# Patient Record
Sex: FEMALE | Race: WHITE | Employment: FULL TIME | URBAN - NONMETROPOLITAN AREA
[De-identification: names, ages, dates, MRNs, and addresses within clinical notes are randomized per-mention and may not be internally consistent; named-entity substitution may affect disease eponyms.]

---

## 2019-06-26 RX ORDER — TRIAMTERENE AND HYDROCHLOROTHIAZIDE 75; 50 MG/1; MG/1
1 TABLET ORAL DAILY
COMMUNITY
Start: 2015-03-25 | End: 2019-06-27 | Stop reason: SDUPTHER

## 2019-06-26 RX ORDER — BUDESONIDE AND FORMOTEROL FUMARATE DIHYDRATE 160; 4.5 UG/1; UG/1
2 AEROSOL RESPIRATORY (INHALATION) 2 TIMES DAILY
COMMUNITY
Start: 2018-05-24 | End: 2019-09-26 | Stop reason: SDUPTHER

## 2019-06-26 RX ORDER — TOPIRAMATE 25 MG/1
1 TABLET ORAL NIGHTLY
COMMUNITY
Start: 2018-05-24 | End: 2019-06-27 | Stop reason: SDUPTHER

## 2019-06-26 RX ORDER — DEXTROAMPHETAMINE SACCHARATE, AMPHETAMINE ASPARTATE MONOHYDRATE, DEXTROAMPHETAMINE SULFATE AND AMPHETAMINE SULFATE 3.75; 3.75; 3.75; 3.75 MG/1; MG/1; MG/1; MG/1
1 CAPSULE, EXTENDED RELEASE ORAL EVERY MORNING
COMMUNITY
Start: 2016-08-25 | End: 2019-06-27 | Stop reason: SDUPTHER

## 2019-06-26 RX ORDER — ALLOPURINOL 300 MG/1
1 TABLET ORAL DAILY
COMMUNITY
Start: 2018-09-17 | End: 2019-06-27 | Stop reason: SDUPTHER

## 2019-06-26 RX ORDER — ATORVASTATIN CALCIUM 20 MG/1
1 TABLET, FILM COATED ORAL
COMMUNITY
Start: 2017-11-20 | End: 2019-06-27 | Stop reason: SDUPTHER

## 2019-06-27 ENCOUNTER — OFFICE VISIT (OUTPATIENT)
Dept: FAMILY MEDICINE CLINIC | Age: 54
End: 2019-06-27
Payer: COMMERCIAL

## 2019-06-27 ENCOUNTER — TELEPHONE (OUTPATIENT)
Dept: FAMILY MEDICINE CLINIC | Age: 54
End: 2019-06-27

## 2019-06-27 VITALS
SYSTOLIC BLOOD PRESSURE: 118 MMHG | HEART RATE: 90 BPM | WEIGHT: 239.13 LBS | OXYGEN SATURATION: 99 % | BODY MASS INDEX: 45.15 KG/M2 | HEIGHT: 61 IN | TEMPERATURE: 97.8 F | DIASTOLIC BLOOD PRESSURE: 80 MMHG

## 2019-06-27 DIAGNOSIS — I10 ESSENTIAL HYPERTENSION: Primary | ICD-10-CM

## 2019-06-27 DIAGNOSIS — J44.9 CHRONIC OBSTRUCTIVE PULMONARY DISEASE, UNSPECIFIED COPD TYPE (HCC): ICD-10-CM

## 2019-06-27 DIAGNOSIS — F34.1 DYSTHYMIA: ICD-10-CM

## 2019-06-27 DIAGNOSIS — F90.0 ATTENTION DEFICIT HYPERACTIVITY DISORDER (ADHD), PREDOMINANTLY INATTENTIVE TYPE: ICD-10-CM

## 2019-06-27 DIAGNOSIS — E78.2 MIXED HYPERLIPIDEMIA: ICD-10-CM

## 2019-06-27 DIAGNOSIS — E55.9 VITAMIN D DEFICIENCY: ICD-10-CM

## 2019-06-27 DIAGNOSIS — R73.01 IMPAIRED FASTING BLOOD SUGAR: ICD-10-CM

## 2019-06-27 DIAGNOSIS — M1A.00X0 IDIOPATHIC CHRONIC GOUT WITHOUT TOPHUS, UNSPECIFIED SITE: ICD-10-CM

## 2019-06-27 PROBLEM — F32.A DEPRESSION: Status: ACTIVE | Noted: 2019-06-27

## 2019-06-27 PROBLEM — E78.5 HYPERLIPIDEMIA: Status: ACTIVE | Noted: 2019-06-27

## 2019-06-27 PROBLEM — M10.9 GOUT: Status: ACTIVE | Noted: 2019-06-27

## 2019-06-27 PROBLEM — F90.9 ADHD (ATTENTION DEFICIT HYPERACTIVITY DISORDER): Status: ACTIVE | Noted: 2019-06-27

## 2019-06-27 PROCEDURE — 99214 OFFICE O/P EST MOD 30 MIN: CPT | Performed by: FAMILY MEDICINE

## 2019-06-27 RX ORDER — DEXTROAMPHETAMINE SACCHARATE, AMPHETAMINE ASPARTATE MONOHYDRATE, DEXTROAMPHETAMINE SULFATE AND AMPHETAMINE SULFATE 3.75; 3.75; 3.75; 3.75 MG/1; MG/1; MG/1; MG/1
1 CAPSULE, EXTENDED RELEASE ORAL EVERY MORNING
Qty: 30 CAPSULE | Refills: 0 | Status: SHIPPED | OUTPATIENT
Start: 2019-08-01 | End: 2019-06-27 | Stop reason: SDUPTHER

## 2019-06-27 RX ORDER — TOPIRAMATE 25 MG/1
25 TABLET ORAL NIGHTLY
Qty: 30 TABLET | Refills: 5 | Status: SHIPPED | OUTPATIENT
Start: 2019-06-27 | End: 2019-09-26 | Stop reason: SDUPTHER

## 2019-06-27 RX ORDER — DEXTROAMPHETAMINE SACCHARATE, AMPHETAMINE ASPARTATE MONOHYDRATE, DEXTROAMPHETAMINE SULFATE AND AMPHETAMINE SULFATE 3.75; 3.75; 3.75; 3.75 MG/1; MG/1; MG/1; MG/1
1 CAPSULE, EXTENDED RELEASE ORAL EVERY MORNING
Qty: 30 CAPSULE | Refills: 0 | Status: SHIPPED | OUTPATIENT
Start: 2019-09-01 | End: 2019-06-27 | Stop reason: SDUPTHER

## 2019-06-27 RX ORDER — ESCITALOPRAM OXALATE 20 MG/1
20 TABLET ORAL DAILY
Qty: 30 TABLET | Refills: 5 | Status: SHIPPED | OUTPATIENT
Start: 2019-06-27 | End: 2019-09-26 | Stop reason: SDUPTHER

## 2019-06-27 RX ORDER — TRIAMTERENE AND HYDROCHLOROTHIAZIDE 75; 50 MG/1; MG/1
1 TABLET ORAL DAILY
Qty: 30 TABLET | Refills: 5 | Status: SHIPPED | OUTPATIENT
Start: 2019-06-27 | End: 2019-09-26 | Stop reason: SDUPTHER

## 2019-06-27 RX ORDER — ALLOPURINOL 300 MG/1
300 TABLET ORAL DAILY
Qty: 30 TABLET | Refills: 5 | Status: SHIPPED | OUTPATIENT
Start: 2019-06-27 | End: 2019-09-26 | Stop reason: SDUPTHER

## 2019-06-27 RX ORDER — ATORVASTATIN CALCIUM 20 MG/1
20 TABLET, FILM COATED ORAL
Qty: 30 TABLET | Refills: 5 | Status: SHIPPED | OUTPATIENT
Start: 2019-06-27 | End: 2019-09-26 | Stop reason: SDUPTHER

## 2019-06-27 RX ORDER — DEXTROAMPHETAMINE SACCHARATE, AMPHETAMINE ASPARTATE MONOHYDRATE, DEXTROAMPHETAMINE SULFATE AND AMPHETAMINE SULFATE 3.75; 3.75; 3.75; 3.75 MG/1; MG/1; MG/1; MG/1
1 CAPSULE, EXTENDED RELEASE ORAL EVERY MORNING
Qty: 30 CAPSULE | Refills: 0 | Status: SHIPPED | OUTPATIENT
Start: 2019-06-27 | End: 2019-09-26

## 2019-06-27 RX ORDER — DEXTROAMPHETAMINE SACCHARATE, AMPHETAMINE ASPARTATE MONOHYDRATE, DEXTROAMPHETAMINE SULFATE AND AMPHETAMINE SULFATE 3.75; 3.75; 3.75; 3.75 MG/1; MG/1; MG/1; MG/1
1 CAPSULE, EXTENDED RELEASE ORAL EVERY MORNING
Qty: 30 CAPSULE | Refills: 0 | Status: SHIPPED | OUTPATIENT
Start: 2019-06-27 | End: 2019-06-27 | Stop reason: SDUPTHER

## 2019-06-27 SDOH — HEALTH STABILITY: MENTAL HEALTH: HOW OFTEN DO YOU HAVE A DRINK CONTAINING ALCOHOL?: MONTHLY OR LESS

## 2019-06-27 SDOH — HEALTH STABILITY: MENTAL HEALTH: HOW MANY STANDARD DRINKS CONTAINING ALCOHOL DO YOU HAVE ON A TYPICAL DAY?: 1 OR 2

## 2019-06-27 ASSESSMENT — PATIENT HEALTH QUESTIONNAIRE - PHQ9
SUM OF ALL RESPONSES TO PHQ QUESTIONS 1-9: 0
SUM OF ALL RESPONSES TO PHQ QUESTIONS 1-9: 0
2. FEELING DOWN, DEPRESSED OR HOPELESS: 0
1. LITTLE INTEREST OR PLEASURE IN DOING THINGS: 0
SUM OF ALL RESPONSES TO PHQ9 QUESTIONS 1 & 2: 0

## 2019-06-27 ASSESSMENT — ENCOUNTER SYMPTOMS
SORE THROAT: 0
BACK PAIN: 0
DIARRHEA: 0
EYE PAIN: 0
VOMITING: 0
SHORTNESS OF BREATH: 0
SINUS PAIN: 0
TROUBLE SWALLOWING: 0
COUGH: 1
CHEST TIGHTNESS: 0
WHEEZING: 0
NAUSEA: 0
CONSTIPATION: 0
ABDOMINAL PAIN: 0

## 2019-06-27 NOTE — PROGRESS NOTES
6/27/19    Name: Cullen Newman  WXA:[id number]   Sex:female   Age:53 y.o. Chief Complaint   Patient presents with    ADHD    Hyperlipidemia     Patient presnets for check up on chronic medical problems    Needs refills on adderall  ADHD has been worse recently, she has been taking her adderall daily recently  Med contract signed today  oarrs reviwed and refills okay  No s/s of abuse    HTN-stable  Taking meds with no problems  But not exercising or really watching diet consistantly    COPD-continues to smoke despite the fact that she has been encouraged to quit multiple times    Depression-stable with the lexapro    Hyperlipidemia-taking meds with no problems  Will recheck labs in 3 months        Review of Systems   Constitutional: Negative for appetite change, fatigue and fever. HENT: Negative for congestion, ear pain, sinus pain, sore throat and trouble swallowing. Eyes: Negative for pain. Respiratory: Positive for cough. Negative for chest tightness, shortness of breath and wheezing. Cardiovascular: Negative for chest pain, palpitations and leg swelling. Gastrointestinal: Negative for abdominal pain, constipation, diarrhea, nausea and vomiting. Endocrine: Negative for cold intolerance and heat intolerance. Genitourinary: Negative for difficulty urinating, hematuria and pelvic pain. Musculoskeletal: Negative for back pain, gait problem and joint swelling. Skin: Negative for rash and wound. Neurological: Negative for dizziness, syncope and headaches. Hematological: Negative for adenopathy. Psychiatric/Behavioral: Positive for sleep disturbance. Negative for confusion and suicidal ideas.            Current Outpatient Medications:     triamterene-hydrochlorothiazide (MAXZIDE) 75-50 MG per tablet, Take 1 tablet by mouth daily, Disp: 30 tablet, Rfl: 5    topiramate (TOPAMAX) 25 MG tablet, Take 1 tablet by mouth nightly, Disp: 30 tablet, Rfl: 5    escitalopram (LEXAPRO) 20 MG tablet, Take equal, round, and reactive to light. EOM are normal.   Neck: Normal range of motion. Cardiovascular: Normal rate and regular rhythm. Pulmonary/Chest: Effort normal and breath sounds normal.   Abdominal: Soft. Neurological: She is alert and oriented to person, place, and time. Skin: Skin is warm and dry. Nursing note and vitals reviewed. Wicho Romero was seen today for adhd and hyperlipidemia. Diagnoses and all orders for this visit:    Essential hypertension  -     triamterene-hydrochlorothiazide (MAXZIDE) 75-50 MG per tablet; Take 1 tablet by mouth daily    Attention deficit hyperactivity disorder (ADHD), predominantly inattentive type  -     Discontinue: amphetamine-dextroamphetamine (ADDERALL XR) 15 MG extended release capsule; Take 1 capsule by mouth every morning for 30 days. -     Discontinue: amphetamine-dextroamphetamine (ADDERALL XR) 15 MG extended release capsule; Take 1 capsule by mouth every morning for 30 days. -     Discontinue: amphetamine-dextroamphetamine (ADDERALL XR) 15 MG extended release capsule; Take 1 capsule by mouth every morning for 30 days. -     amphetamine-dextroamphetamine (ADDERALL XR) 15 MG extended release capsule; Take 1 capsule by mouth every morning for 30 days. Chronic obstructive pulmonary disease, unspecified COPD type (HCC)    Mixed hyperlipidemia  -     atorvastatin (LIPITOR) 20 MG tablet; Take 1 tablet by mouth Daily with supper  -     CBC Auto Differential; Future  -     Comprehensive Metabolic Panel; Future  -     Lipid Panel; Future    Dysthymia  -     topiramate (TOPAMAX) 25 MG tablet; Take 1 tablet by mouth nightly  -     escitalopram (LEXAPRO) 20 MG tablet; Take 1 tablet by mouth daily    Idiopathic chronic gout without tophus, unspecified site  -     allopurinol (ZYLOPRIM) 300 MG tablet; Take 1 tablet by mouth daily    Impaired fasting blood sugar  -     Hemoglobin A1C; Future    Vitamin D deficiency  -     Vitamin D 25 Hydroxy;  Future    lab order

## 2019-06-27 NOTE — TELEPHONE ENCOUNTER
[facility] [address] left a voice message stating that they received two prescriptions for Adderall. They are needing clarification on which prescription is to be filled. Please call [facility] [address] at [phone]        When message has been addressed, please route message to office pool not to original sender.     Electronically signed by Vinicius Escobar MA on 6/27/2019 at 2:58 PM

## 2019-06-28 ENCOUNTER — TELEPHONE (OUTPATIENT)
Dept: FAMILY MEDICINE CLINIC | Age: 54
End: 2019-06-28

## 2019-09-23 ENCOUNTER — HOSPITAL ENCOUNTER (OUTPATIENT)
Age: 54
Discharge: HOME | End: 2019-09-25
Payer: COMMERCIAL

## 2019-09-23 DIAGNOSIS — E55.9 VITAMIN D DEFICIENCY: ICD-10-CM

## 2019-09-23 DIAGNOSIS — E78.2 MIXED HYPERLIPIDEMIA: ICD-10-CM

## 2019-09-23 DIAGNOSIS — R73.01 IMPAIRED FASTING BLOOD SUGAR: ICD-10-CM

## 2019-09-23 LAB
ALBUMIN SERPL-MCNC: 4 G/DL (ref 3.5–5.2)
ALP BLD-CCNC: 137 U/L (ref 35–104)
ALT SERPL-CCNC: 26 U/L (ref 0–32)
ANION GAP SERPL CALCULATED.3IONS-SCNC: 18 MMOL/L (ref 7–16)
ANISOCYTOSIS: ABNORMAL
AST SERPL-CCNC: 20 U/L (ref 0–31)
BASOPHILS ABSOLUTE: 0 E9/L (ref 0–0.2)
BASOPHILS RELATIVE PERCENT: 0.9 % (ref 0–2)
BILIRUB SERPL-MCNC: 0.3 MG/DL (ref 0–1.2)
BUN BLDV-MCNC: 20 MG/DL (ref 6–20)
BURR CELLS: ABNORMAL
CALCIUM SERPL-MCNC: 9.6 MG/DL (ref 8.6–10.2)
CHLORIDE BLD-SCNC: 103 MMOL/L (ref 98–107)
CHOLESTEROL, TOTAL: 221 MG/DL (ref 0–199)
CO2: 25 MMOL/L (ref 22–29)
CREAT SERPL-MCNC: 1 MG/DL (ref 0.5–1)
EOSINOPHILS ABSOLUTE: 0.41 E9/L (ref 0.05–0.5)
EOSINOPHILS RELATIVE PERCENT: 6.1 % (ref 0–6)
GFR AFRICAN AMERICAN: >60
GFR NON-AFRICAN AMERICAN: 57 ML/MIN/1.73
GLUCOSE BLD-MCNC: 109 MG/DL (ref 74–99)
HBA1C MFR BLD: 5.6 % (ref 4–5.6)
HCT VFR BLD CALC: 52.8 % (ref 34–48)
HDLC SERPL-MCNC: 47 MG/DL
HEMOGLOBIN: 17.3 G/DL (ref 11.5–15.5)
LDL CHOLESTEROL CALCULATED: 109 MG/DL (ref 0–99)
LYMPHOCYTES ABSOLUTE: 2.11 E9/L (ref 1.5–4)
LYMPHOCYTES RELATIVE PERCENT: 31.3 % (ref 20–42)
MCH RBC QN AUTO: 32.8 PG (ref 26–35)
MCHC RBC AUTO-ENTMCNC: 32.8 % (ref 32–34.5)
MCV RBC AUTO: 100 FL (ref 80–99.9)
MONOCYTES ABSOLUTE: 0.34 E9/L (ref 0.1–0.95)
MONOCYTES RELATIVE PERCENT: 5.2 % (ref 2–12)
NEUTROPHILS ABSOLUTE: 3.88 E9/L (ref 1.8–7.3)
NEUTROPHILS RELATIVE PERCENT: 57.4 % (ref 43–80)
PDW BLD-RTO: 14.9 FL (ref 11.5–15)
PLATELET # BLD: 251 E9/L (ref 130–450)
PMV BLD AUTO: 10.3 FL (ref 7–12)
POIKILOCYTES: ABNORMAL
POLYCHROMASIA: ABNORMAL
POTASSIUM SERPL-SCNC: 3.7 MMOL/L (ref 3.5–5)
RBC # BLD: 5.28 E12/L (ref 3.5–5.5)
SODIUM BLD-SCNC: 146 MMOL/L (ref 132–146)
TOTAL PROTEIN: 7.5 G/DL (ref 6.4–8.3)
TRIGL SERPL-MCNC: 327 MG/DL (ref 0–149)
VITAMIN D 25-HYDROXY: 47 NG/ML (ref 30–100)
VLDLC SERPL CALC-MCNC: 65 MG/DL
WBC # BLD: 6.8 E9/L (ref 4.5–11.5)

## 2019-09-23 PROCEDURE — 80053 COMPREHEN METABOLIC PANEL: CPT

## 2019-09-23 PROCEDURE — 36415 COLL VENOUS BLD VENIPUNCTURE: CPT

## 2019-09-23 PROCEDURE — 85025 COMPLETE CBC W/AUTO DIFF WBC: CPT

## 2019-09-23 PROCEDURE — 80061 LIPID PANEL: CPT

## 2019-09-23 PROCEDURE — 83036 HEMOGLOBIN GLYCOSYLATED A1C: CPT

## 2019-09-23 PROCEDURE — 82306 VITAMIN D 25 HYDROXY: CPT

## 2019-09-26 ENCOUNTER — OFFICE VISIT (OUTPATIENT)
Dept: FAMILY MEDICINE CLINIC | Age: 54
End: 2019-09-26
Payer: COMMERCIAL

## 2019-09-26 VITALS
HEART RATE: 88 BPM | DIASTOLIC BLOOD PRESSURE: 80 MMHG | BODY MASS INDEX: 44.58 KG/M2 | TEMPERATURE: 98.2 F | SYSTOLIC BLOOD PRESSURE: 132 MMHG | WEIGHT: 236.13 LBS | OXYGEN SATURATION: 93 % | HEIGHT: 61 IN

## 2019-09-26 DIAGNOSIS — Z23 IMMUNIZATION DUE: ICD-10-CM

## 2019-09-26 DIAGNOSIS — I10 ESSENTIAL HYPERTENSION: Primary | ICD-10-CM

## 2019-09-26 DIAGNOSIS — M1A.00X0 IDIOPATHIC CHRONIC GOUT WITHOUT TOPHUS, UNSPECIFIED SITE: ICD-10-CM

## 2019-09-26 DIAGNOSIS — F32.1 CURRENT MODERATE EPISODE OF MAJOR DEPRESSIVE DISORDER WITHOUT PRIOR EPISODE (HCC): ICD-10-CM

## 2019-09-26 DIAGNOSIS — F90.0 ATTENTION DEFICIT HYPERACTIVITY DISORDER (ADHD), PREDOMINANTLY INATTENTIVE TYPE: ICD-10-CM

## 2019-09-26 DIAGNOSIS — F34.1 DYSTHYMIA: ICD-10-CM

## 2019-09-26 DIAGNOSIS — R10.11 RUQ PAIN: ICD-10-CM

## 2019-09-26 DIAGNOSIS — E78.2 MIXED HYPERLIPIDEMIA: ICD-10-CM

## 2019-09-26 PROCEDURE — 90471 IMMUNIZATION ADMIN: CPT | Performed by: FAMILY MEDICINE

## 2019-09-26 PROCEDURE — 90686 IIV4 VACC NO PRSV 0.5 ML IM: CPT | Performed by: FAMILY MEDICINE

## 2019-09-26 PROCEDURE — 99214 OFFICE O/P EST MOD 30 MIN: CPT | Performed by: FAMILY MEDICINE

## 2019-09-26 RX ORDER — ALLOPURINOL 300 MG/1
300 TABLET ORAL DAILY
Qty: 30 TABLET | Refills: 5 | Status: SHIPPED
Start: 2019-09-26 | End: 2020-04-29 | Stop reason: SDUPTHER

## 2019-09-26 RX ORDER — TOPIRAMATE 25 MG/1
25 TABLET ORAL NIGHTLY
Qty: 30 TABLET | Refills: 5 | Status: SHIPPED
Start: 2019-09-26 | End: 2020-04-29 | Stop reason: SDUPTHER

## 2019-09-26 RX ORDER — DEXTROAMPHETAMINE SACCHARATE, AMPHETAMINE ASPARTATE MONOHYDRATE, DEXTROAMPHETAMINE SULFATE AND AMPHETAMINE SULFATE 7.5; 7.5; 7.5; 7.5 MG/1; MG/1; MG/1; MG/1
30 CAPSULE, EXTENDED RELEASE ORAL DAILY
Qty: 30 CAPSULE | Refills: 0 | Status: SHIPPED | OUTPATIENT
Start: 2019-11-22 | End: 2020-04-29 | Stop reason: SDUPTHER

## 2019-09-26 RX ORDER — ATORVASTATIN CALCIUM 20 MG/1
20 TABLET, FILM COATED ORAL
Qty: 30 TABLET | Refills: 5 | Status: SHIPPED
Start: 2019-09-26 | End: 2020-04-29 | Stop reason: SDUPTHER

## 2019-09-26 RX ORDER — TRIAMTERENE AND HYDROCHLOROTHIAZIDE 75; 50 MG/1; MG/1
1 TABLET ORAL DAILY
Qty: 30 TABLET | Refills: 5 | Status: SHIPPED
Start: 2019-09-26 | End: 2020-04-29 | Stop reason: SDUPTHER

## 2019-09-26 RX ORDER — ESCITALOPRAM OXALATE 20 MG/1
30 TABLET ORAL DAILY
Qty: 45 TABLET | Refills: 5 | Status: SHIPPED
Start: 2019-09-26 | End: 2020-04-29 | Stop reason: SDUPTHER

## 2019-09-26 RX ORDER — DEXTROAMPHETAMINE SACCHARATE, AMPHETAMINE ASPARTATE MONOHYDRATE, DEXTROAMPHETAMINE SULFATE AND AMPHETAMINE SULFATE 7.5; 7.5; 7.5; 7.5 MG/1; MG/1; MG/1; MG/1
30 CAPSULE, EXTENDED RELEASE ORAL DAILY
Qty: 30 CAPSULE | Refills: 0 | Status: SHIPPED | OUTPATIENT
Start: 2019-09-26 | End: 2019-09-26 | Stop reason: SDUPTHER

## 2019-09-26 RX ORDER — DEXTROAMPHETAMINE SACCHARATE, AMPHETAMINE ASPARTATE MONOHYDRATE, DEXTROAMPHETAMINE SULFATE AND AMPHETAMINE SULFATE 7.5; 7.5; 7.5; 7.5 MG/1; MG/1; MG/1; MG/1
30 CAPSULE, EXTENDED RELEASE ORAL DAILY
Qty: 30 CAPSULE | Refills: 0 | Status: SHIPPED | OUTPATIENT
Start: 2019-10-24 | End: 2019-09-26 | Stop reason: SDUPTHER

## 2019-09-26 RX ORDER — BUDESONIDE AND FORMOTEROL FUMARATE DIHYDRATE 160; 4.5 UG/1; UG/1
2 AEROSOL RESPIRATORY (INHALATION) 2 TIMES DAILY
Qty: 1 INHALER | Refills: 5 | Status: SHIPPED
Start: 2019-09-26 | End: 2020-04-29 | Stop reason: SDUPTHER

## 2019-09-26 ASSESSMENT — ENCOUNTER SYMPTOMS
SHORTNESS OF BREATH: 1
COUGH: 0
BACK PAIN: 0
CONSTIPATION: 0
VOMITING: 0
SORE THROAT: 0
ABDOMINAL PAIN: 0
WHEEZING: 0
TROUBLE SWALLOWING: 0
EYE PAIN: 0
DIARRHEA: 0
SINUS PAIN: 0
CHEST TIGHTNESS: 0
NAUSEA: 0

## 2020-04-29 ENCOUNTER — OFFICE VISIT (OUTPATIENT)
Dept: PRIMARY CARE CLINIC | Age: 55
End: 2020-04-29
Payer: COMMERCIAL

## 2020-04-29 ENCOUNTER — HOSPITAL ENCOUNTER (OUTPATIENT)
Age: 55
Discharge: HOME | End: 2020-05-01
Payer: COMMERCIAL

## 2020-04-29 VITALS
SYSTOLIC BLOOD PRESSURE: 118 MMHG | DIASTOLIC BLOOD PRESSURE: 66 MMHG | BODY MASS INDEX: 43.54 KG/M2 | HEIGHT: 61 IN | WEIGHT: 230.6 LBS | OXYGEN SATURATION: 93 % | TEMPERATURE: 97.5 F | HEART RATE: 100 BPM

## 2020-04-29 LAB
ALBUMIN SERPL-MCNC: 4.1 G/DL (ref 3.5–5.2)
ALP BLD-CCNC: 141 U/L (ref 35–104)
ALT SERPL-CCNC: 28 U/L (ref 0–32)
ANION GAP SERPL CALCULATED.3IONS-SCNC: 13 MMOL/L (ref 7–16)
AST SERPL-CCNC: 20 U/L (ref 0–31)
BASOPHILS ABSOLUTE: 0.05 E9/L (ref 0–0.2)
BASOPHILS RELATIVE PERCENT: 0.5 % (ref 0–2)
BILIRUB SERPL-MCNC: 0.2 MG/DL (ref 0–1.2)
BUN BLDV-MCNC: 20 MG/DL (ref 6–20)
CALCIUM SERPL-MCNC: 10.5 MG/DL (ref 8.6–10.2)
CHLORIDE BLD-SCNC: 95 MMOL/L (ref 98–107)
CHOLESTEROL, TOTAL: 200 MG/DL (ref 0–199)
CO2: 30 MMOL/L (ref 22–29)
CREAT SERPL-MCNC: 0.9 MG/DL (ref 0.5–1)
EOSINOPHILS ABSOLUTE: 0.2 E9/L (ref 0.05–0.5)
EOSINOPHILS RELATIVE PERCENT: 2.2 % (ref 0–6)
GFR AFRICAN AMERICAN: >60
GFR NON-AFRICAN AMERICAN: >60 ML/MIN/1.73
GLUCOSE BLD-MCNC: 92 MG/DL (ref 74–99)
HCT VFR BLD CALC: 53.2 % (ref 34–48)
HDLC SERPL-MCNC: 44 MG/DL
HEMOGLOBIN: 17.4 G/DL (ref 11.5–15.5)
IMMATURE GRANULOCYTES #: 0.03 E9/L
IMMATURE GRANULOCYTES %: 0.3 % (ref 0–5)
LDL CHOLESTEROL CALCULATED: 81 MG/DL (ref 0–99)
LYMPHOCYTES ABSOLUTE: 3.08 E9/L (ref 1.5–4)
LYMPHOCYTES RELATIVE PERCENT: 33.3 % (ref 20–42)
MCH RBC QN AUTO: 32.6 PG (ref 26–35)
MCHC RBC AUTO-ENTMCNC: 32.7 % (ref 32–34.5)
MCV RBC AUTO: 99.6 FL (ref 80–99.9)
MONOCYTES ABSOLUTE: 0.53 E9/L (ref 0.1–0.95)
MONOCYTES RELATIVE PERCENT: 5.7 % (ref 2–12)
NEUTROPHILS ABSOLUTE: 5.36 E9/L (ref 1.8–7.3)
NEUTROPHILS RELATIVE PERCENT: 58 % (ref 43–80)
PDW BLD-RTO: 14.1 FL (ref 11.5–15)
PLATELET # BLD: 273 E9/L (ref 130–450)
PMV BLD AUTO: 10.5 FL (ref 7–12)
POTASSIUM SERPL-SCNC: 4 MMOL/L (ref 3.5–5)
RBC # BLD: 5.34 E12/L (ref 3.5–5.5)
SODIUM BLD-SCNC: 138 MMOL/L (ref 132–146)
TOTAL PROTEIN: 7.5 G/DL (ref 6.4–8.3)
TRIGL SERPL-MCNC: 375 MG/DL (ref 0–149)
VLDLC SERPL CALC-MCNC: 75 MG/DL
WBC # BLD: 9.3 E9/L (ref 4.5–11.5)

## 2020-04-29 PROCEDURE — 80053 COMPREHEN METABOLIC PANEL: CPT

## 2020-04-29 PROCEDURE — 99396 PREV VISIT EST AGE 40-64: CPT | Performed by: FAMILY MEDICINE

## 2020-04-29 PROCEDURE — 85025 COMPLETE CBC W/AUTO DIFF WBC: CPT

## 2020-04-29 PROCEDURE — 81001 URINALYSIS AUTO W/SCOPE: CPT

## 2020-04-29 PROCEDURE — 80061 LIPID PANEL: CPT

## 2020-04-29 PROCEDURE — 36415 COLL VENOUS BLD VENIPUNCTURE: CPT

## 2020-04-29 RX ORDER — DEXTROAMPHETAMINE SACCHARATE, AMPHETAMINE ASPARTATE MONOHYDRATE, DEXTROAMPHETAMINE SULFATE AND AMPHETAMINE SULFATE 7.5; 7.5; 7.5; 7.5 MG/1; MG/1; MG/1; MG/1
30 CAPSULE, EXTENDED RELEASE ORAL DAILY
Qty: 30 CAPSULE | Refills: 0 | Status: SHIPPED | OUTPATIENT
Start: 2020-06-27 | End: 2020-07-22 | Stop reason: SDUPTHER

## 2020-04-29 RX ORDER — ESCITALOPRAM OXALATE 20 MG/1
30 TABLET ORAL DAILY
Qty: 45 TABLET | Refills: 5 | Status: SHIPPED
Start: 2020-04-29 | End: 2020-07-22 | Stop reason: SDUPTHER

## 2020-04-29 RX ORDER — DEXTROAMPHETAMINE SACCHARATE, AMPHETAMINE ASPARTATE MONOHYDRATE, DEXTROAMPHETAMINE SULFATE AND AMPHETAMINE SULFATE 7.5; 7.5; 7.5; 7.5 MG/1; MG/1; MG/1; MG/1
30 CAPSULE, EXTENDED RELEASE ORAL DAILY
Qty: 30 CAPSULE | Refills: 0 | Status: SHIPPED
Start: 2020-04-29 | End: 2020-04-29 | Stop reason: SDUPTHER

## 2020-04-29 RX ORDER — BUDESONIDE AND FORMOTEROL FUMARATE DIHYDRATE 160; 4.5 UG/1; UG/1
2 AEROSOL RESPIRATORY (INHALATION) 2 TIMES DAILY
Qty: 1 INHALER | Refills: 5 | Status: SHIPPED
Start: 2020-04-29 | End: 2020-07-22 | Stop reason: SDUPTHER

## 2020-04-29 RX ORDER — DEXTROAMPHETAMINE SACCHARATE, AMPHETAMINE ASPARTATE MONOHYDRATE, DEXTROAMPHETAMINE SULFATE AND AMPHETAMINE SULFATE 7.5; 7.5; 7.5; 7.5 MG/1; MG/1; MG/1; MG/1
30 CAPSULE, EXTENDED RELEASE ORAL DAILY
Qty: 30 CAPSULE | Refills: 0 | Status: SHIPPED | OUTPATIENT
Start: 2020-05-29 | End: 2020-04-29 | Stop reason: SDUPTHER

## 2020-04-29 RX ORDER — TOPIRAMATE 25 MG/1
25 TABLET ORAL NIGHTLY
Qty: 30 TABLET | Refills: 5 | Status: SHIPPED
Start: 2020-04-29 | End: 2020-07-22 | Stop reason: SDUPTHER

## 2020-04-29 RX ORDER — ATORVASTATIN CALCIUM 20 MG/1
20 TABLET, FILM COATED ORAL
Qty: 30 TABLET | Refills: 5 | Status: SHIPPED
Start: 2020-04-29 | End: 2020-05-01

## 2020-04-29 RX ORDER — TRIAMTERENE AND HYDROCHLOROTHIAZIDE 75; 50 MG/1; MG/1
1 TABLET ORAL DAILY
Qty: 30 TABLET | Refills: 5 | Status: SHIPPED
Start: 2020-04-29 | End: 2020-05-01

## 2020-04-29 RX ORDER — ALLOPURINOL 300 MG/1
300 TABLET ORAL DAILY
Qty: 30 TABLET | Refills: 5 | Status: SHIPPED
Start: 2020-04-29 | End: 2020-07-22 | Stop reason: SDUPTHER

## 2020-04-29 ASSESSMENT — ENCOUNTER SYMPTOMS
COUGH: 1
EYE PAIN: 0
NAUSEA: 0
DIARRHEA: 0
SORE THROAT: 0
VOMITING: 0
WHEEZING: 0
CONSTIPATION: 0
TROUBLE SWALLOWING: 0
BACK PAIN: 0
ABDOMINAL PAIN: 0
SHORTNESS OF BREATH: 0
CHEST TIGHTNESS: 0
SINUS PAIN: 0

## 2020-04-30 LAB
AMORPHOUS: ABNORMAL
BACTERIA: ABNORMAL /HPF
BILIRUBIN URINE: NEGATIVE
BLOOD, URINE: NEGATIVE
CLARITY: ABNORMAL
COLOR: YELLOW
EPITHELIAL CELLS, UA: ABNORMAL /HPF
GLUCOSE URINE: NEGATIVE MG/DL
KETONES, URINE: NEGATIVE MG/DL
LEUKOCYTE ESTERASE, URINE: NEGATIVE
NITRITE, URINE: NEGATIVE
PH UA: 7 (ref 5–9)
PROTEIN UA: ABNORMAL MG/DL
RBC UA: ABNORMAL /HPF (ref 0–2)
SPECIFIC GRAVITY UA: 1.02 (ref 1–1.03)
UROBILINOGEN, URINE: 0.2 E.U./DL
WBC UA: ABNORMAL /HPF (ref 0–5)

## 2020-05-01 RX ORDER — LOSARTAN POTASSIUM AND HYDROCHLOROTHIAZIDE 25; 100 MG/1; MG/1
1 TABLET ORAL DAILY
Qty: 30 TABLET | Refills: 2 | Status: SHIPPED
Start: 2020-05-01 | End: 2020-07-22 | Stop reason: SDUPTHER

## 2020-05-01 RX ORDER — ROSUVASTATIN CALCIUM 20 MG/1
20 TABLET, COATED ORAL DAILY
Qty: 30 TABLET | Refills: 2 | Status: SHIPPED
Start: 2020-05-01 | End: 2020-07-22 | Stop reason: SDUPTHER

## 2020-07-22 ENCOUNTER — OFFICE VISIT (OUTPATIENT)
Dept: FAMILY MEDICINE CLINIC | Age: 55
End: 2020-07-22
Payer: COMMERCIAL

## 2020-07-22 VITALS
TEMPERATURE: 97.9 F | DIASTOLIC BLOOD PRESSURE: 70 MMHG | WEIGHT: 227 LBS | SYSTOLIC BLOOD PRESSURE: 108 MMHG | BODY MASS INDEX: 42.86 KG/M2 | OXYGEN SATURATION: 92 % | HEART RATE: 90 BPM | HEIGHT: 61 IN

## 2020-07-22 PROCEDURE — 99214 OFFICE O/P EST MOD 30 MIN: CPT | Performed by: FAMILY MEDICINE

## 2020-07-22 RX ORDER — ROSUVASTATIN CALCIUM 20 MG/1
20 TABLET, COATED ORAL DAILY
Qty: 30 TABLET | Refills: 5 | Status: SHIPPED
Start: 2020-07-22 | End: 2021-01-11 | Stop reason: SDUPTHER

## 2020-07-22 RX ORDER — BUDESONIDE AND FORMOTEROL FUMARATE DIHYDRATE 160; 4.5 UG/1; UG/1
2 AEROSOL RESPIRATORY (INHALATION) 2 TIMES DAILY
Qty: 1 INHALER | Refills: 5 | Status: SHIPPED
Start: 2020-07-22 | End: 2021-01-11 | Stop reason: SDUPTHER

## 2020-07-22 RX ORDER — ALLOPURINOL 300 MG/1
300 TABLET ORAL DAILY
Qty: 30 TABLET | Refills: 5 | Status: SHIPPED
Start: 2020-07-22 | End: 2021-01-11 | Stop reason: SDUPTHER

## 2020-07-22 RX ORDER — DEXTROAMPHETAMINE SACCHARATE, AMPHETAMINE ASPARTATE MONOHYDRATE, DEXTROAMPHETAMINE SULFATE AND AMPHETAMINE SULFATE 7.5; 7.5; 7.5; 7.5 MG/1; MG/1; MG/1; MG/1
30 CAPSULE, EXTENDED RELEASE ORAL DAILY
Qty: 30 CAPSULE | Refills: 0 | Status: SHIPPED
Start: 2020-07-22 | End: 2020-07-22 | Stop reason: SDUPTHER

## 2020-07-22 RX ORDER — DEXTROAMPHETAMINE SACCHARATE, AMPHETAMINE ASPARTATE MONOHYDRATE, DEXTROAMPHETAMINE SULFATE AND AMPHETAMINE SULFATE 7.5; 7.5; 7.5; 7.5 MG/1; MG/1; MG/1; MG/1
30 CAPSULE, EXTENDED RELEASE ORAL DAILY
Qty: 30 CAPSULE | Refills: 0 | Status: SHIPPED | OUTPATIENT
Start: 2020-08-20 | End: 2020-07-22 | Stop reason: SDUPTHER

## 2020-07-22 RX ORDER — LOSARTAN POTASSIUM AND HYDROCHLOROTHIAZIDE 25; 100 MG/1; MG/1
1 TABLET ORAL DAILY
Qty: 30 TABLET | Refills: 5 | Status: SHIPPED
Start: 2020-07-22 | End: 2021-01-11 | Stop reason: SDUPTHER

## 2020-07-22 RX ORDER — ESCITALOPRAM OXALATE 20 MG/1
30 TABLET ORAL DAILY
Qty: 45 TABLET | Refills: 5 | Status: SHIPPED
Start: 2020-07-22 | End: 2021-01-11 | Stop reason: SDUPTHER

## 2020-07-22 RX ORDER — DEXTROAMPHETAMINE SACCHARATE, AMPHETAMINE ASPARTATE MONOHYDRATE, DEXTROAMPHETAMINE SULFATE AND AMPHETAMINE SULFATE 7.5; 7.5; 7.5; 7.5 MG/1; MG/1; MG/1; MG/1
30 CAPSULE, EXTENDED RELEASE ORAL DAILY
Qty: 30 CAPSULE | Refills: 0 | Status: SHIPPED | OUTPATIENT
Start: 2020-09-18 | End: 2020-10-19 | Stop reason: SDUPTHER

## 2020-07-22 RX ORDER — TOPIRAMATE 25 MG/1
25 TABLET ORAL NIGHTLY
Qty: 30 TABLET | Refills: 5 | Status: SHIPPED
Start: 2020-07-22 | End: 2021-01-11 | Stop reason: SDUPTHER

## 2020-07-22 ASSESSMENT — ENCOUNTER SYMPTOMS
BACK PAIN: 0
TROUBLE SWALLOWING: 0
CONSTIPATION: 0
VOMITING: 0
SORE THROAT: 0
SINUS PAIN: 0
ABDOMINAL PAIN: 0
DIARRHEA: 0
SHORTNESS OF BREATH: 0
WHEEZING: 0
NAUSEA: 0
COUGH: 0
CHEST TIGHTNESS: 0
EYE PAIN: 0

## 2020-07-22 NOTE — PROGRESS NOTES
7/22/20    Name: Toribio Trejo  KENNY:[id number]   Sex:female   Age:54 y.o. Chief Complaint   Patient presents with    ADHD    Hypertension    Gout    Depression     Patient presents to office for visit. She needs refills on her adderall today. Patient says that the pharmacy did not give her the new scripts she was supposed to be taking (hyzaar and crestor), so she hasn't started those medications yet. Patient's main complaint is that for the past two months her feet go numb if she sits for any period of time. She says as soon as she gets up and moves around the numbness goes away. Patient caught her right 5 th toe on crate a few weeks ago. The toe is still swollen, bruised, and painful. Patient here for follow up on chronic medical problems    HTN stable  But meds were never changed from last visit  All prescriptions were changed this visit    Hyperlipidemia  Was started on cholesterol last visit but the pharmacy never gave it to her  So she has not been taking it  But she also did not call here nor did she question the pharmacy about her prescriptions    ADHD  Still taking the adderall  When not at work she does not take it  She has been doing well  No problems at work, staying focused and getting her job done    Trying to move more and stay active  Her weight is down a few pounds    Has not gotten her mammo done yet  Will put an order in for our mammo suite    Review of Systems   Constitutional: Negative for appetite change, fatigue and fever. HENT: Negative for congestion, ear pain, sinus pain, sore throat and trouble swallowing. Eyes: Negative for pain. Respiratory: Negative for cough, chest tightness, shortness of breath and wheezing. Cardiovascular: Negative for chest pain, palpitations and leg swelling. Gastrointestinal: Negative for abdominal pain, constipation, diarrhea, nausea and vomiting. Endocrine: Negative for cold intolerance and heat intolerance.    Genitourinary: Negative for difficulty urinating, dysuria, frequency, hematuria, pelvic pain and urgency. Musculoskeletal: Negative for arthralgias, back pain, gait problem, joint swelling and myalgias. Skin: Negative for rash and wound. Neurological: Positive for numbness. Negative for dizziness, syncope, light-headedness and headaches. Hematological: Negative for adenopathy. Psychiatric/Behavioral: Negative for confusion, decreased concentration, dysphoric mood, self-injury, sleep disturbance and suicidal ideas. The patient is not nervous/anxious. Current Outpatient Medications:     allopurinol (ZYLOPRIM) 300 MG tablet, Take 1 tablet by mouth daily, Disp: 30 tablet, Rfl: 5    budesonide-formoterol (SYMBICORT) 160-4.5 MCG/ACT AERO, Inhale 2 puffs into the lungs 2 times daily, Disp: 1 Inhaler, Rfl: 5    escitalopram (LEXAPRO) 20 MG tablet, Take 1.5 tablets by mouth daily, Disp: 45 tablet, Rfl: 5    losartan-hydrochlorothiazide (HYZAAR) 100-25 MG per tablet, Take 1 tablet by mouth daily, Disp: 30 tablet, Rfl: 5    rosuvastatin (CRESTOR) 20 MG tablet, Take 1 tablet by mouth daily, Disp: 30 tablet, Rfl: 5    topiramate (TOPAMAX) 25 MG tablet, Take 1 tablet by mouth nightly, Disp: 30 tablet, Rfl: 5    [START ON 9/18/2020] amphetamine-dextroamphetamine (ADDERALL XR) 30 MG extended release capsule, Take 1 capsule by mouth daily for 30 days. , Disp: 30 capsule, Rfl: 0  No Known Allergies   Past Medical History:   Diagnosis Date    ADHD (attention deficit hyperactivity disorder)     [facility]ch[facility] obstructive pulmonary disease) ([facility])     Depression     GERD (gastroesophageal reflux disease)     Gout     Headache     migraines    Hyperlipidemia     Hypertension     Irritable bowel syndrome     Narcolepsy      Patient Active Problem List    Diagnosis Date Noted    COPD (chronic obstructive pulmonary disease) ([facility] [address] 06/27/2019    ADHD (attention deficit hyperactivity disorder) 06/27/2019    Hyperlipidemia 06/27/2019  Depression 06/27/2019    Gout 06/27/2019    Essential hypertension 03/25/2015      Past Surgical History:   Procedure Laterality Date    TUBAL LIGATION        Social History     Tobacco History     Smoking Status  Current Every Day Smoker Smoking Frequency  1 pack/day for 42 years (42 pk yrs) Smoking Tobacco Type  Cigarettes    Smokeless Tobacco Use  Never Used          Alcohol History     Alcohol Use Status  Yes Comment  rarely          Drug Use     Drug Use Status  No          Sexual Activity     Sexually Active  Not Currently Comment              /70   Pulse 90   Temp 97.9 °F (36.6 °C)   Ht 5' 1\" (1.549 m)   Wt 227 lb (103 kg)   SpO2 92%   BMI 42.89 kg/m²     EXAM:   Physical Exam  Vitals signs and nursing note reviewed. Constitutional:       Appearance: Normal appearance. She is well-developed. She is obese. HENT:      Head: Normocephalic and atraumatic. Right Ear: Tympanic membrane normal.      Left Ear: Tympanic membrane normal.      Nose: Nose normal.      Mouth/Throat:      Mouth: Mucous membranes are moist.   Eyes:      Pupils: Pupils are equal, round, and reactive to light. Neck:      Musculoskeletal: Normal range of motion. Cardiovascular:      Rate and Rhythm: Normal rate and regular rhythm. Pulmonary:      Effort: Pulmonary effort is normal.      Breath sounds: Normal breath sounds. Abdominal:      General: Bowel sounds are normal.      Palpations: Abdomen is soft. Musculoskeletal:      Comments: Gait normal in the office today   Skin:     General: Skin is warm and dry. Neurological:      General: No focal deficit present. Mental Status: She is alert and oriented to person, place, and time. Psychiatric:         Mood and Affect: Mood normal.         Thought Content: Thought content normal.          Kevyn Andrews was seen today for adhd, hypertension, gout and depression.     Diagnoses and all orders for this visit:    Essential hypertension  Comments:  stable  new rx sent to get her on the right meds    Mixed hyperlipidemia  Comments:  resent rosuvastatin  rechecl levels in a few months    Idiopathic chronic gout without tophus, unspecified site  -     allopurinol (ZYLOPRIM) 300 MG tablet; Take 1 tablet by mouth daily    Chronic obstructive pulmonary disease, unspecified COPD type ([facility] [address]  Comments:  stable with inhaler  no chnages    Dysthymia  -     escitalopram (LEXAPRO) 20 MG tablet; Take 1.5 tablets by mouth daily  -     topiramate (TOPAMAX) 25 MG tablet; Take 1 tablet by mouth nightly    Current moderate episode of major depressive disorder without prior episode ([facility] [address]  Comments:  stable  Orders:  -     escitalopram (LEXAPRO) 20 MG tablet; Take 1.5 tablets by mouth daily    Attention deficit hyperactivity disorder (ADHD), predominantly inattentive type  Comments:  still having issues, would like refills &agreed to 90dy appts  poor attention at work and this rreally helps her focus and get tasks done  med contract updated,  Orders:  -     Discontinue: amphetamine-dextroamphetamine (ADDERALL XR) 30 MG extended release capsule; Take 1 capsule by mouth daily for 30 days. -     Discontinue: amphetamine-dextroamphetamine (ADDERALL XR) 30 MG extended release capsule; Take 1 capsule by mouth daily for 30 days. -     amphetamine-dextroamphetamine (ADDERALL XR) 30 MG extended release capsule; Take 1 capsule by mouth daily for 30 days. Encounter for screening mammogram for breast cancer  -     IGLESIA DIGITAL SCREEN W OR WO CAD BILATERAL; Future    Other orders  -     budesonide-formoterol (SYMBICORT) 160-4.5 MCG/ACT AERO; Inhale 2 puffs into the lungs 2 times daily  -     losartan-hydrochlorothiazide (HYZAAR) 100-25 MG per tablet; Take 1 tablet by mouth daily  -     rosuvastatin (CRESTOR) 20 MG tablet;  Take 1 tablet by mouth daily    3 month appt with labs before appt      I independently reviewed and updated the chief complaint, HPI, past medical and surgical history, medications, allergies and ROS as entered by the LPN. Seen by:   Rafa Hollis,

## 2020-10-14 ENCOUNTER — HOSPITAL ENCOUNTER (OUTPATIENT)
Age: 55
Discharge: HOME | End: 2020-10-16
Payer: COMMERCIAL

## 2020-10-14 LAB
ALBUMIN SERPL-MCNC: 4 G/DL (ref 3.5–5.2)
ALP BLD-CCNC: 122 U/L (ref 35–104)
ALT SERPL-CCNC: 24 U/L (ref 0–32)
ANION GAP SERPL CALCULATED.3IONS-SCNC: 20 MMOL/L (ref 7–16)
AST SERPL-CCNC: 17 U/L (ref 0–31)
BILIRUB SERPL-MCNC: 0.2 MG/DL (ref 0–1.2)
BUN BLDV-MCNC: 22 MG/DL (ref 6–20)
CALCIUM SERPL-MCNC: 10.1 MG/DL (ref 8.6–10.2)
CHLORIDE BLD-SCNC: 96 MMOL/L (ref 98–107)
CHOLESTEROL, TOTAL: 168 MG/DL (ref 0–199)
CO2: 24 MMOL/L (ref 22–29)
CREAT SERPL-MCNC: 0.9 MG/DL (ref 0.5–1)
GFR AFRICAN AMERICAN: >60
GFR NON-AFRICAN AMERICAN: >60 ML/MIN/1.73
GLUCOSE BLD-MCNC: 100 MG/DL (ref 74–99)
HDLC SERPL-MCNC: 51 MG/DL
LDL CHOLESTEROL CALCULATED: 71 MG/DL (ref 0–99)
POTASSIUM SERPL-SCNC: 3.7 MMOL/L (ref 3.5–5)
SODIUM BLD-SCNC: 140 MMOL/L (ref 132–146)
TOTAL PROTEIN: 7.4 G/DL (ref 6.4–8.3)
TRIGL SERPL-MCNC: 232 MG/DL (ref 0–149)
VLDLC SERPL CALC-MCNC: 46 MG/DL

## 2020-10-14 PROCEDURE — 80053 COMPREHEN METABOLIC PANEL: CPT

## 2020-10-14 PROCEDURE — 80061 LIPID PANEL: CPT

## 2020-10-14 PROCEDURE — 36415 COLL VENOUS BLD VENIPUNCTURE: CPT

## 2020-10-16 LAB
BILIRUBIN URINE: NEGATIVE
BLOOD, URINE: NEGATIVE
CLARITY: CLEAR
COLOR: YELLOW
GLUCOSE URINE: NEGATIVE MG/DL
KETONES, URINE: NEGATIVE MG/DL
LEUKOCYTE ESTERASE, URINE: NEGATIVE
MICROALBUMIN UR-MCNC: 47.5 MG/L
NITRITE, URINE: NEGATIVE
PH UA: 5.5 (ref 5–9)
PROTEIN UA: NEGATIVE MG/DL
SPECIFIC GRAVITY UA: 1.01 (ref 1–1.03)
UROBILINOGEN, URINE: 1 E.U./DL

## 2020-10-16 PROCEDURE — 82044 UR ALBUMIN SEMIQUANTITATIVE: CPT

## 2020-10-16 PROCEDURE — 81003 URINALYSIS AUTO W/O SCOPE: CPT

## 2020-10-19 ENCOUNTER — OFFICE VISIT (OUTPATIENT)
Dept: FAMILY MEDICINE CLINIC | Age: 55
End: 2020-10-19
Payer: COMMERCIAL

## 2020-10-19 VITALS
TEMPERATURE: 97.9 F | WEIGHT: 229.2 LBS | SYSTOLIC BLOOD PRESSURE: 110 MMHG | OXYGEN SATURATION: 94 % | BODY MASS INDEX: 43.27 KG/M2 | HEIGHT: 61 IN | HEART RATE: 96 BPM | DIASTOLIC BLOOD PRESSURE: 60 MMHG

## 2020-10-19 PROCEDURE — 90686 IIV4 VACC NO PRSV 0.5 ML IM: CPT | Performed by: FAMILY MEDICINE

## 2020-10-19 PROCEDURE — 90471 IMMUNIZATION ADMIN: CPT | Performed by: FAMILY MEDICINE

## 2020-10-19 PROCEDURE — 99214 OFFICE O/P EST MOD 30 MIN: CPT | Performed by: FAMILY MEDICINE

## 2020-10-19 RX ORDER — DEXTROAMPHETAMINE SACCHARATE, AMPHETAMINE ASPARTATE MONOHYDRATE, DEXTROAMPHETAMINE SULFATE AND AMPHETAMINE SULFATE 7.5; 7.5; 7.5; 7.5 MG/1; MG/1; MG/1; MG/1
30 CAPSULE, EXTENDED RELEASE ORAL DAILY
Qty: 30 CAPSULE | Refills: 0 | Status: SHIPPED | OUTPATIENT
Start: 2020-11-18 | End: 2020-10-19 | Stop reason: SDUPTHER

## 2020-10-19 RX ORDER — DEXTROAMPHETAMINE SACCHARATE, AMPHETAMINE ASPARTATE MONOHYDRATE, DEXTROAMPHETAMINE SULFATE AND AMPHETAMINE SULFATE 7.5; 7.5; 7.5; 7.5 MG/1; MG/1; MG/1; MG/1
30 CAPSULE, EXTENDED RELEASE ORAL DAILY
Qty: 30 CAPSULE | Refills: 0 | Status: SHIPPED
Start: 2020-10-19 | End: 2020-10-19 | Stop reason: SDUPTHER

## 2020-10-19 RX ORDER — DEXTROAMPHETAMINE SACCHARATE, AMPHETAMINE ASPARTATE MONOHYDRATE, DEXTROAMPHETAMINE SULFATE AND AMPHETAMINE SULFATE 7.5; 7.5; 7.5; 7.5 MG/1; MG/1; MG/1; MG/1
30 CAPSULE, EXTENDED RELEASE ORAL DAILY
Qty: 30 CAPSULE | Refills: 0 | Status: SHIPPED | OUTPATIENT
Start: 2020-12-17 | End: 2021-01-11 | Stop reason: SDUPTHER

## 2020-10-19 ASSESSMENT — ENCOUNTER SYMPTOMS
COUGH: 0
SORE THROAT: 0
CHEST TIGHTNESS: 0
NAUSEA: 0
DIARRHEA: 0
BACK PAIN: 0
SHORTNESS OF BREATH: 0
VOMITING: 0
SINUS PAIN: 0
ABDOMINAL PAIN: 0
TROUBLE SWALLOWING: 0
EYE PAIN: 0
WHEEZING: 0
CONSTIPATION: 0

## 2020-10-19 NOTE — PROGRESS NOTES
10/19/20    Name: Paddy Roberts  VTV:[id number]   Sex:female   Age:54 y.o. Chief Complaint   Patient presents with    ADHD     Patient presents to office for visit. She says she doesn't think the Adderall really helps much. She still falls asleep or almost falls asleep at work. Patient says she believes she used to be on Ritalin in the past, though she doesn't remember if it worked for her or not. She is supposed to wear CPAP, but she doesn't tolerate it, so she won't wear it. Patient says she feels very tired and very unmotivated. She says she doesn't thinks she is depressed because she doesn't have a reason to be depressed. Patient did get labs done. She will have flu shot today. Patient here for check up today    She has been taking her adderall but despite this still ahving a lot of daytime fatigue  Trouble staying awake at work again  She has sleep apnea and dos not wear her cpap  Encouraged her to do so but she will not, we have had this discussion multiple times int he past  Will refer her back to [facility] sleep [place] where her study was done, maybe they can get her an oral appliance  But if she does not start to treat her sleep issues she may lose her job  Cannot increase adderall any further, that is not likely to make any difference      cholestrol and sugar doing great ,  'she has been watching diet with regards to that  Taking meds daily     Migraines stable as well  Continue topomax for now        Review of Systems   Constitutional: Positive for fatigue. Negative for appetite change and fever. HENT: Negative for congestion, ear pain, sinus pain, sore throat and trouble swallowing. Eyes: Negative for pain. Respiratory: Negative for cough, chest tightness, shortness of breath and wheezing. Cardiovascular: Negative for chest pain, palpitations and leg swelling. Gastrointestinal: Negative for abdominal pain, constipation, diarrhea, nausea and vomiting.    Endocrine: Negative for cold intolerance and heat intolerance. Genitourinary: Negative for difficulty urinating, dysuria, frequency, hematuria, pelvic pain and urgency. Musculoskeletal: Negative for arthralgias, back pain, gait problem, joint swelling and myalgias. Skin: Negative for rash and wound. Neurological: Negative for dizziness, syncope, light-headedness and headaches. Hematological: Negative for adenopathy. Psychiatric/Behavioral: Positive for decreased concentration and sleep disturbance. Negative for confusion, dysphoric mood, self-injury and suicidal ideas. The patient is not nervous/anxious. Current Outpatient Medications:     [START ON 12/17/2020] amphetamine-dextroamphetamine (ADDERALL XR) 30 MG extended release capsule, Take 1 capsule by mouth daily for 30 days. , Disp: 30 capsule, Rfl: 0    allopurinol (ZYLOPRIM) 300 MG tablet, Take 1 tablet by mouth daily, Disp: 30 tablet, Rfl: 5    budesonide-formoterol (SYMBICORT) 160-4.5 MCG/ACT AERO, Inhale 2 puffs into the lungs 2 times daily, Disp: 1 Inhaler, Rfl: 5    escitalopram (LEXAPRO) 20 MG tablet, Take 1.5 tablets by mouth daily, Disp: 45 tablet, Rfl: 5    losartan-hydrochlorothiazide (HYZAAR) 100-25 MG per tablet, Take 1 tablet by mouth daily, Disp: 30 tablet, Rfl: 5    rosuvastatin (CRESTOR) 20 MG tablet, Take 1 tablet by mouth daily, Disp: 30 tablet, Rfl: 5    topiramate (TOPAMAX) 25 MG tablet, Take 1 tablet by mouth nightly, Disp: 30 tablet, Rfl: 5  No Known Allergies   Past Medical History:   Diagnosis Date    ADHD (attention deficit hyperactivity disorder)     [facility]     Depression     GERD (gastroesophageal reflux disease)     Gout     Headache     migraines    Hyperlipidemia     Hypertension     Irritable bowel syndrome     Narcolepsy      Patient Active Problem List    Diagnosis Date Noted    COPD (chronic obstructive pulmonary disease) ([facility] [address] 06/27/2019    ADHD (attention deficit hyperactivity disorder (ADHD), predominantly inattentive type  Comments:  still having issues, would like refills &agreed to 90dy appts  poor attention at work and this rreally helps her focus and get tasks done  med contract updated,  Orders:  -     Discontinue: amphetamine-dextroamphetamine (ADDERALL XR) 30 MG extended release capsule; Take 1 capsule by mouth daily for 30 days. -     Discontinue: amphetamine-dextroamphetamine (ADDERALL XR) 30 MG extended release capsule; Take 1 capsule by mouth daily for 30 days. -     amphetamine-dextroamphetamine (ADDERALL XR) 30 MG extended release capsule; Take 1 capsule by mouth daily for 30 days. Obstructive sleep apnea syndrome  Comments:  refer back to [place], she had her test done there and now with oral appliances she would do better with that  excessive daytime sleepiness is interfer w work  Orders:  -     Cancel: AFL ([facility]) - Mima Valiente MD, Pulmonary, [place]  -     External Referral To Sleep Medicine    Need for vaccination  -     INFLUENZA, QUADV, 3 YRS AND OLDER, IM PF, PREFILL SYR OR SDV, 0.5ML (AFLURIA QUADV, PF)    Excessive daytime sleepiness  -     External Referral To Sleep Medicine    Class 3 severe obesity due to excess calories with serious comorbidity and body mass index (BMI) of 40.0 to 44.9 in adult [facility])    Screen for colon cancer  Comments:  she agreed to fit  explained if not i would refer for colonoscopy and she does not want to do that  Orders:  -     POCT Fit Test; Future    f/u in 3 months  Refills today adderall  Labs reviewed      I independently reviewed and updated the chief complaint, HPI, past medical and surgical history, medications, allergies and ROS as entered by the LPN. Seen by:   Hedy Angeles, DO

## 2020-12-07 ENCOUNTER — TELEPHONE (OUTPATIENT)
Dept: FAMILY MEDICINE CLINIC | Age: 55
End: 2020-12-07

## 2021-01-05 DIAGNOSIS — Z12.11 SCREEN FOR COLON CANCER: ICD-10-CM

## 2021-01-05 LAB
CONTROL: NORMAL
HEMOCCULT STL QL: NEGATIVE

## 2021-01-11 ENCOUNTER — OFFICE VISIT (OUTPATIENT)
Dept: FAMILY MEDICINE CLINIC | Age: 56
End: 2021-01-11
Payer: COMMERCIAL

## 2021-01-11 VITALS
HEART RATE: 94 BPM | HEIGHT: 61 IN | DIASTOLIC BLOOD PRESSURE: 76 MMHG | OXYGEN SATURATION: 93 % | BODY MASS INDEX: 43.23 KG/M2 | TEMPERATURE: 97.9 F | SYSTOLIC BLOOD PRESSURE: 126 MMHG | WEIGHT: 229 LBS

## 2021-01-11 DIAGNOSIS — F90.0 ATTENTION DEFICIT HYPERACTIVITY DISORDER (ADHD), PREDOMINANTLY INATTENTIVE TYPE: ICD-10-CM

## 2021-01-11 DIAGNOSIS — E78.2 MIXED HYPERLIPIDEMIA: ICD-10-CM

## 2021-01-11 DIAGNOSIS — J44.9 CHRONIC OBSTRUCTIVE PULMONARY DISEASE, UNSPECIFIED COPD TYPE (HCC): ICD-10-CM

## 2021-01-11 DIAGNOSIS — M1A.00X0 IDIOPATHIC CHRONIC GOUT WITHOUT TOPHUS, UNSPECIFIED SITE: ICD-10-CM

## 2021-01-11 DIAGNOSIS — F32.1 CURRENT MODERATE EPISODE OF MAJOR DEPRESSIVE DISORDER WITHOUT PRIOR EPISODE (HCC): ICD-10-CM

## 2021-01-11 DIAGNOSIS — R73.01 IMPAIRED FASTING BLOOD SUGAR: ICD-10-CM

## 2021-01-11 DIAGNOSIS — R51.9 CHRONIC NONINTRACTABLE HEADACHE, UNSPECIFIED HEADACHE TYPE: ICD-10-CM

## 2021-01-11 DIAGNOSIS — F34.1 DYSTHYMIA: ICD-10-CM

## 2021-01-11 DIAGNOSIS — G89.29 CHRONIC NONINTRACTABLE HEADACHE, UNSPECIFIED HEADACHE TYPE: ICD-10-CM

## 2021-01-11 DIAGNOSIS — I10 ESSENTIAL HYPERTENSION: Primary | ICD-10-CM

## 2021-01-11 DIAGNOSIS — E07.9 THYROID DISEASE: ICD-10-CM

## 2021-01-11 PROCEDURE — 99214 OFFICE O/P EST MOD 30 MIN: CPT | Performed by: FAMILY MEDICINE

## 2021-01-11 RX ORDER — ESCITALOPRAM OXALATE 20 MG/1
30 TABLET ORAL DAILY
Qty: 45 TABLET | Refills: 5 | Status: SHIPPED
Start: 2021-01-11 | End: 2021-04-15 | Stop reason: SDUPTHER

## 2021-01-11 RX ORDER — TOPIRAMATE 25 MG/1
25 TABLET ORAL NIGHTLY
Qty: 30 TABLET | Refills: 5 | Status: SHIPPED
Start: 2021-01-11 | End: 2021-04-15 | Stop reason: SDUPTHER

## 2021-01-11 RX ORDER — DEXTROAMPHETAMINE SACCHARATE, AMPHETAMINE ASPARTATE MONOHYDRATE, DEXTROAMPHETAMINE SULFATE AND AMPHETAMINE SULFATE 7.5; 7.5; 7.5; 7.5 MG/1; MG/1; MG/1; MG/1
30 CAPSULE, EXTENDED RELEASE ORAL DAILY
Qty: 30 CAPSULE | Refills: 0 | Status: SHIPPED | OUTPATIENT
Start: 2021-03-03 | End: 2021-01-11 | Stop reason: SDUPTHER

## 2021-01-11 RX ORDER — BUDESONIDE AND FORMOTEROL FUMARATE DIHYDRATE 160; 4.5 UG/1; UG/1
2 AEROSOL RESPIRATORY (INHALATION) 2 TIMES DAILY
Qty: 1 INHALER | Refills: 5 | Status: SHIPPED
Start: 2021-01-11 | End: 2021-04-15 | Stop reason: SDUPTHER

## 2021-01-11 RX ORDER — ROPINIROLE 0.5 MG/1
0.5 TABLET, FILM COATED ORAL 3 TIMES DAILY
Qty: 90 TABLET | Refills: 2 | Status: SHIPPED
Start: 2021-01-11 | End: 2021-04-15 | Stop reason: SDUPTHER

## 2021-01-11 RX ORDER — DEXTROAMPHETAMINE SACCHARATE, AMPHETAMINE ASPARTATE MONOHYDRATE, DEXTROAMPHETAMINE SULFATE AND AMPHETAMINE SULFATE 7.5; 7.5; 7.5; 7.5 MG/1; MG/1; MG/1; MG/1
30 CAPSULE, EXTENDED RELEASE ORAL DAILY
Qty: 30 CAPSULE | Refills: 0 | Status: SHIPPED | OUTPATIENT
Start: 2021-01-03 | End: 2021-01-11 | Stop reason: SDUPTHER

## 2021-01-11 RX ORDER — DEXTROAMPHETAMINE SACCHARATE, AMPHETAMINE ASPARTATE MONOHYDRATE, DEXTROAMPHETAMINE SULFATE AND AMPHETAMINE SULFATE 7.5; 7.5; 7.5; 7.5 MG/1; MG/1; MG/1; MG/1
30 CAPSULE, EXTENDED RELEASE ORAL DAILY
Qty: 30 CAPSULE | Refills: 0 | Status: SHIPPED | OUTPATIENT
Start: 2021-02-03 | End: 2021-01-11 | Stop reason: SDUPTHER

## 2021-01-11 RX ORDER — LOSARTAN POTASSIUM AND HYDROCHLOROTHIAZIDE 25; 100 MG/1; MG/1
1 TABLET ORAL DAILY
Qty: 30 TABLET | Refills: 5 | Status: SHIPPED
Start: 2021-01-11 | End: 2021-04-15 | Stop reason: SDUPTHER

## 2021-01-11 RX ORDER — DEXTROAMPHETAMINE SACCHARATE, AMPHETAMINE ASPARTATE MONOHYDRATE, DEXTROAMPHETAMINE SULFATE AND AMPHETAMINE SULFATE 7.5; 7.5; 7.5; 7.5 MG/1; MG/1; MG/1; MG/1
30 CAPSULE, EXTENDED RELEASE ORAL DAILY
Qty: 30 CAPSULE | Refills: 0 | Status: SHIPPED | OUTPATIENT
Start: 2021-04-01 | End: 2021-04-15 | Stop reason: SDUPTHER

## 2021-01-11 RX ORDER — ALLOPURINOL 300 MG/1
300 TABLET ORAL DAILY
Qty: 30 TABLET | Refills: 5 | Status: SHIPPED
Start: 2021-01-11 | End: 2021-01-11 | Stop reason: ALTCHOICE

## 2021-01-11 RX ORDER — ROSUVASTATIN CALCIUM 20 MG/1
20 TABLET, COATED ORAL DAILY
Qty: 30 TABLET | Refills: 5 | Status: SHIPPED
Start: 2021-01-11 | End: 2021-04-15 | Stop reason: SDUPTHER

## 2021-01-11 ASSESSMENT — ENCOUNTER SYMPTOMS
VOMITING: 0
BACK PAIN: 0
NAUSEA: 0
SORE THROAT: 0
CHEST TIGHTNESS: 0
ABDOMINAL PAIN: 0
SHORTNESS OF BREATH: 0
WHEEZING: 0
DIARRHEA: 0
TROUBLE SWALLOWING: 0
SINUS PAIN: 0
COUGH: 1
CONSTIPATION: 0
EYE PAIN: 0

## 2021-01-11 NOTE — PROGRESS NOTES
We will try requip and see if it is more restless leg issue  She denies any back pain and no history of back problems  I did ask her to work on weight and even judt 10 pound weight loss to se if that would help    HTN stable  Home readings have been good'    Depression seems stable  Taking lexapro          Review of Systems   Constitutional: Negative for appetite change, fatigue and fever. HENT: Negative for congestion, ear pain, sinus pain, sore throat and trouble swallowing. Eyes: Negative for pain. Respiratory: Positive for cough. Negative for chest tightness, shortness of breath and wheezing. Cardiovascular: Positive for leg swelling. Negative for chest pain and palpitations. Gastrointestinal: Negative for abdominal pain, constipation, diarrhea, nausea and vomiting. Endocrine: Negative for cold intolerance and heat intolerance. Genitourinary: Negative for difficulty urinating, dysuria, frequency, hematuria, pelvic pain and urgency. Musculoskeletal: Negative for arthralgias, back pain, gait problem, joint swelling and myalgias. Skin: Negative for rash and wound. Neurological: Positive for numbness. Negative for dizziness, syncope, light-headedness and headaches. Hematological: Negative for adenopathy. Psychiatric/Behavioral: Positive for sleep disturbance. Negative for confusion, dysphoric mood, self-injury and suicidal ideas. The patient is not nervous/anxious.             Current Outpatient Medications:     topiramate (TOPAMAX) 25 MG tablet, Take 1 tablet by mouth nightly, Disp: 30 tablet, Rfl: 5    rosuvastatin (CRESTOR) 20 MG tablet, Take 1 tablet by mouth daily, Disp: 30 tablet, Rfl: 5    losartan-hydroCHLOROthiazide (HYZAAR) 100-25 MG per tablet, Take 1 tablet by mouth daily, Disp: 30 tablet, Rfl: 5    escitalopram (LEXAPRO) 20 MG tablet, Take 1.5 tablets by mouth daily, Disp: 45 tablet, Rfl: 5   budesonide-formoterol (SYMBICORT) 160-4.5 MCG/ACT AERO, Inhale 2 puffs into the lungs 2 times daily, Disp: 1 Inhaler, Rfl: 5    [START ON 4/1/2021] amphetamine-dextroamphetamine (ADDERALL XR) 30 MG extended release capsule, Take 1 capsule by mouth daily for 30 days. , Disp: 30 capsule, Rfl: 0    rOPINIRole (REQUIP) 0.5 MG tablet, Take 1 tablet by mouth 3 times daily For feet and lower legs, Disp: 90 tablet, Rfl: 2  No Known Allergies   Past Medical History:   Diagnosis Date    ADHD (attention deficit hyperactivity disorder)     [facility]     Depression     GERD (gastroesophageal reflux disease)     Gout     Headache     migraines    Hyperlipidemia     Hypertension     Irritable bowel syndrome     Narcolepsy      Patient Active Problem List    Diagnosis Date Noted    Headache     [facility] 06/27/2019    ADHD (attention deficit hyperactivity disorder) 06/27/2019    Hyperlipidemia 06/27/2019    Depression 06/27/2019    Gout 06/27/2019    Essential hypertension 03/25/2015      Past Surgical History:   Procedure Laterality Date    TUBAL LIGATION        Social History     Tobacco History     Smoking Status  Current Every Day Smoker Smoking Frequency  1 pack/day for 42 years (42 pk yrs) Smoking Tobacco Type  Cigarettes    Smokeless Tobacco Use  Never Used          Alcohol History     Alcohol Use Status  Yes Comment  rarely          Drug Use     Drug Use Status  No          Sexual Activity     Sexually Active  Not Currently Comment              /76   Pulse 94   Temp 97.9 °F (36.6 °C)   Ht 5' 1\" (1.549 m)   Wt 229 lb (103.9 kg)   SpO2 93%   BMI 43.27 kg/m²     EXAM:   Physical Exam  Vitals signs and nursing note reviewed. Constitutional:       Appearance: Normal appearance. She is well-developed. She is obese. HENT:      Head: Normocephalic and atraumatic.       Right Ear: Tympanic membrane normal. Left Ear: Tympanic membrane normal.      Nose: Nose normal.      Mouth/Throat:      Mouth: Mucous membranes are moist.   Eyes:      Pupils: Pupils are equal, round, and reactive to light. Neck:      Musculoskeletal: Normal range of motion. Cardiovascular:      Rate and Rhythm: Normal rate and regular rhythm. Pulmonary:      Effort: Pulmonary effort is normal.      Breath sounds: Normal breath sounds. Abdominal:      General: Bowel sounds are normal.      Palpations: Abdomen is soft. Musculoskeletal:      Comments: Gait normal in the office today   Skin:     General: Skin is warm and dry. Neurological:      General: No focal deficit present. Mental Status: She is alert and oriented to person, place, and time. Psychiatric:         Mood and Affect: Mood normal.         Thought Content: Thought content normal.          Jose Carlos Hensley was seen today for adhd, hypertension, gout and depression. Diagnoses and all orders for this visit:    Essential hypertension  Comments:  well controlled  home readings good  continue medications  refills sent  Orders:  -     CBC Auto Differential; Future  -     Comprehensive Metabolic Panel; Future    Dysthymia  Comments:  stable  no chnages  Orders:  -     topiramate (TOPAMAX) 25 MG tablet; Take 1 tablet by mouth nightly  -     escitalopram (LEXAPRO) 20 MG tablet; Take 1.5 tablets by mouth daily    Current moderate episode of major depressive disorder without prior episode ([address]  Comments:  stable  Orders:  -     escitalopram (LEXAPRO) 20 MG tablet; Take 1.5 tablets by mouth daily    Idiopathic chronic gout without tophus, unspecified site  Comments:  has been stable  she wants to see how she does with out the allopurinol so we will stop nad see if she does well  Orders:  -     Discontinue: allopurinol (ZYLOPRIM) 300 MG tablet;  Take 1 tablet by mouth daily    Attention deficit hyperactivity disorder (ADHD), predominantly inattentive type  Comments: still having issues, would like refills &agreed to 90dy appts  poor attention at work and this rreally helps her focus and get tasks done  med contract updated,  Orders:  -     Discontinue: amphetamine-dextroamphetamine (ADDERALL XR) 30 MG extended release capsule; Take 1 capsule by mouth daily for 30 days. -     Discontinue: amphetamine-dextroamphetamine (ADDERALL XR) 30 MG extended release capsule; Take 1 capsule by mouth daily for 30 days. -     Discontinue: amphetamine-dextroamphetamine (ADDERALL XR) 30 MG extended release capsule; Take 1 capsule by mouth daily for 30 days. -     amphetamine-dextroamphetamine (ADDERALL XR) 30 MG extended release capsule; Take 1 capsule by mouth daily for 30 days. Chronic nonintractable headache, unspecified headache type  Comments:  stale with topomx  continue for now    Chronic obstructive pulmonary disease, unspecified [facility] ([facility]  Comments:  still smoking  she was advised to quit    Mixed hyperlipidemia  -     Lipid Panel; Future    Thyroid disease  -     TSH without Reflex; Future  -     T4, Free; Future    Impaired fasting blood sugar  -     Hemoglobin A1C; Future    Other orders  -     rosuvastatin (CRESTOR) 20 MG tablet; Take 1 tablet by mouth daily  -     losartan-hydroCHLOROthiazide (HYZAAR) 100-25 MG per tablet; Take 1 tablet by mouth daily  -     budesonide-formoterol (SYMBICORT) 160-4.5 MCG/ACT AERO; Inhale 2 puffs into the lungs 2 times daily  -     rOPINIRole (REQUIP) 0.5 MG tablet; Take 1 tablet by mouth 3 times daily For feet and lower legs    appt in 3 months  Labs before this visit    I independently reviewed and updated the chief complaint, HPI, past medical and surgical history, medications, allergies and ROS as entered by the LPN. Seen by:   Alayna Holguin, DO

## 2021-04-08 DIAGNOSIS — R73.01 IMPAIRED FASTING BLOOD SUGAR: ICD-10-CM

## 2021-04-08 DIAGNOSIS — I10 ESSENTIAL HYPERTENSION: ICD-10-CM

## 2021-04-08 DIAGNOSIS — E07.9 THYROID DISEASE: ICD-10-CM

## 2021-04-08 DIAGNOSIS — E78.2 MIXED HYPERLIPIDEMIA: ICD-10-CM

## 2021-04-08 LAB
ALBUMIN SERPL-MCNC: 4 G/DL (ref 3.5–5.2)
ALP BLD-CCNC: 105 U/L (ref 35–104)
ALT SERPL-CCNC: 17 U/L (ref 0–32)
ANION GAP SERPL CALCULATED.3IONS-SCNC: 13 MMOL/L (ref 7–16)
AST SERPL-CCNC: 16 U/L (ref 0–31)
BASOPHILS ABSOLUTE: 0.05 E9/L (ref 0–0.2)
BASOPHILS RELATIVE PERCENT: 0.6 % (ref 0–2)
BILIRUB SERPL-MCNC: 0.4 MG/DL (ref 0–1.2)
BUN BLDV-MCNC: 15 MG/DL (ref 6–20)
CALCIUM SERPL-MCNC: 9.6 MG/DL (ref 8.6–10.2)
CHLORIDE BLD-SCNC: 100 MMOL/L (ref 98–107)
CHOLESTEROL, TOTAL: 149 MG/DL (ref 0–199)
CO2: 29 MMOL/L (ref 22–29)
CREAT SERPL-MCNC: 0.9 MG/DL (ref 0.5–1)
EOSINOPHILS ABSOLUTE: 0.35 E9/L (ref 0.05–0.5)
EOSINOPHILS RELATIVE PERCENT: 4.1 % (ref 0–6)
GFR AFRICAN AMERICAN: >60
GFR NON-AFRICAN AMERICAN: >60 ML/MIN/1.73
GLUCOSE BLD-MCNC: 100 MG/DL (ref 74–99)
HBA1C MFR BLD: 5.4 % (ref 4–5.6)
HCT VFR BLD CALC: 49.4 % (ref 34–48)
HDLC SERPL-MCNC: 52 MG/DL
HEMOGLOBIN: 16.4 G/DL (ref 11.5–15.5)
IMMATURE GRANULOCYTES #: 0.03 E9/L
IMMATURE GRANULOCYTES %: 0.3 % (ref 0–5)
LDL CHOLESTEROL CALCULATED: 65 MG/DL (ref 0–99)
LYMPHOCYTES ABSOLUTE: 2.4 E9/L (ref 1.5–4)
LYMPHOCYTES RELATIVE PERCENT: 27.9 % (ref 20–42)
MCH RBC QN AUTO: 33.5 PG (ref 26–35)
MCHC RBC AUTO-ENTMCNC: 33.2 % (ref 32–34.5)
MCV RBC AUTO: 101 FL (ref 80–99.9)
MONOCYTES ABSOLUTE: 0.46 E9/L (ref 0.1–0.95)
MONOCYTES RELATIVE PERCENT: 5.3 % (ref 2–12)
NEUTROPHILS ABSOLUTE: 5.32 E9/L (ref 1.8–7.3)
NEUTROPHILS RELATIVE PERCENT: 61.8 % (ref 43–80)
PDW BLD-RTO: 14.5 FL (ref 11.5–15)
PLATELET # BLD: 251 E9/L (ref 130–450)
PMV BLD AUTO: 10.4 FL (ref 7–12)
POTASSIUM SERPL-SCNC: 3.8 MMOL/L (ref 3.5–5)
RBC # BLD: 4.89 E12/L (ref 3.5–5.5)
SODIUM BLD-SCNC: 142 MMOL/L (ref 132–146)
T4 FREE: 1.3 NG/DL (ref 0.93–1.7)
TOTAL PROTEIN: 6.9 G/DL (ref 6.4–8.3)
TRIGL SERPL-MCNC: 159 MG/DL (ref 0–149)
TSH SERPL DL<=0.05 MIU/L-ACNC: 1.05 UIU/ML (ref 0.27–4.2)
VLDLC SERPL CALC-MCNC: 32 MG/DL
WBC # BLD: 8.6 E9/L (ref 4.5–11.5)

## 2021-04-15 ENCOUNTER — OFFICE VISIT (OUTPATIENT)
Dept: FAMILY MEDICINE CLINIC | Age: 56
End: 2021-04-15
Payer: COMMERCIAL

## 2021-04-15 VITALS
HEIGHT: 61 IN | BODY MASS INDEX: 42.97 KG/M2 | HEART RATE: 86 BPM | TEMPERATURE: 98.2 F | OXYGEN SATURATION: 96 % | WEIGHT: 227.6 LBS | SYSTOLIC BLOOD PRESSURE: 132 MMHG | DIASTOLIC BLOOD PRESSURE: 74 MMHG

## 2021-04-15 DIAGNOSIS — J44.9 CHRONIC OBSTRUCTIVE PULMONARY DISEASE, UNSPECIFIED COPD TYPE (HCC): ICD-10-CM

## 2021-04-15 DIAGNOSIS — F34.1 DYSTHYMIA: ICD-10-CM

## 2021-04-15 DIAGNOSIS — F90.0 ATTENTION DEFICIT HYPERACTIVITY DISORDER (ADHD), PREDOMINANTLY INATTENTIVE TYPE: ICD-10-CM

## 2021-04-15 DIAGNOSIS — E78.2 MIXED HYPERLIPIDEMIA: ICD-10-CM

## 2021-04-15 DIAGNOSIS — Z00.00 ENCOUNTER FOR WELL ADULT EXAM WITHOUT ABNORMAL FINDINGS: Primary | ICD-10-CM

## 2021-04-15 DIAGNOSIS — F32.1 CURRENT MODERATE EPISODE OF MAJOR DEPRESSIVE DISORDER WITHOUT PRIOR EPISODE (HCC): ICD-10-CM

## 2021-04-15 DIAGNOSIS — I10 ESSENTIAL HYPERTENSION: ICD-10-CM

## 2021-04-15 PROCEDURE — 99396 PREV VISIT EST AGE 40-64: CPT | Performed by: FAMILY MEDICINE

## 2021-04-15 RX ORDER — DEXTROAMPHETAMINE SACCHARATE, AMPHETAMINE ASPARTATE MONOHYDRATE, DEXTROAMPHETAMINE SULFATE AND AMPHETAMINE SULFATE 7.5; 7.5; 7.5; 7.5 MG/1; MG/1; MG/1; MG/1
30 CAPSULE, EXTENDED RELEASE ORAL DAILY
Qty: 30 CAPSULE | Refills: 0 | Status: SHIPPED | OUTPATIENT
Start: 2021-07-02 | End: 2021-07-12 | Stop reason: SDUPTHER

## 2021-04-15 RX ORDER — LOSARTAN POTASSIUM AND HYDROCHLOROTHIAZIDE 25; 100 MG/1; MG/1
1 TABLET ORAL DAILY
Qty: 30 TABLET | Refills: 5 | Status: SHIPPED
Start: 2021-04-15 | End: 2021-10-12 | Stop reason: SDUPTHER

## 2021-04-15 RX ORDER — ALLOPURINOL 300 MG/1
TABLET ORAL
Qty: 30 TABLET | Refills: 5 | Status: SHIPPED
Start: 2021-04-15 | End: 2021-10-12 | Stop reason: SDUPTHER

## 2021-04-15 RX ORDER — ESCITALOPRAM OXALATE 20 MG/1
30 TABLET ORAL DAILY
Qty: 45 TABLET | Refills: 5 | Status: SHIPPED
Start: 2021-04-15 | End: 2021-10-12 | Stop reason: SDUPTHER

## 2021-04-15 RX ORDER — BUDESONIDE AND FORMOTEROL FUMARATE DIHYDRATE 160; 4.5 UG/1; UG/1
2 AEROSOL RESPIRATORY (INHALATION) 2 TIMES DAILY
Qty: 1 INHALER | Refills: 5 | Status: SHIPPED
Start: 2021-04-15 | End: 2021-10-12 | Stop reason: SDUPTHER

## 2021-04-15 RX ORDER — DEXTROAMPHETAMINE SACCHARATE, AMPHETAMINE ASPARTATE MONOHYDRATE, DEXTROAMPHETAMINE SULFATE AND AMPHETAMINE SULFATE 7.5; 7.5; 7.5; 7.5 MG/1; MG/1; MG/1; MG/1
30 CAPSULE, EXTENDED RELEASE ORAL DAILY
Qty: 30 CAPSULE | Refills: 0 | Status: SHIPPED | OUTPATIENT
Start: 2021-05-06 | End: 2021-04-15 | Stop reason: SDUPTHER

## 2021-04-15 RX ORDER — TOPIRAMATE 25 MG/1
25 TABLET ORAL NIGHTLY
Qty: 30 TABLET | Refills: 5 | Status: SHIPPED
Start: 2021-04-15 | End: 2021-10-12 | Stop reason: SDUPTHER

## 2021-04-15 RX ORDER — ALLOPURINOL 300 MG/1
TABLET ORAL
COMMUNITY
Start: 2021-04-02 | End: 2021-04-15 | Stop reason: SDUPTHER

## 2021-04-15 RX ORDER — DEXTROAMPHETAMINE SACCHARATE, AMPHETAMINE ASPARTATE MONOHYDRATE, DEXTROAMPHETAMINE SULFATE AND AMPHETAMINE SULFATE 7.5; 7.5; 7.5; 7.5 MG/1; MG/1; MG/1; MG/1
30 CAPSULE, EXTENDED RELEASE ORAL DAILY
Qty: 30 CAPSULE | Refills: 0 | Status: SHIPPED | OUTPATIENT
Start: 2021-06-04 | End: 2021-04-15 | Stop reason: SDUPTHER

## 2021-04-15 RX ORDER — ROPINIROLE 0.5 MG/1
0.5 TABLET, FILM COATED ORAL 3 TIMES DAILY
Qty: 90 TABLET | Refills: 2 | Status: SHIPPED
Start: 2021-04-15 | End: 2021-04-15 | Stop reason: SDUPTHER

## 2021-04-15 RX ORDER — ROPINIROLE 1 MG/1
1-2 TABLET, FILM COATED ORAL 3 TIMES DAILY
Qty: 120 TABLET | Refills: 5 | Status: SHIPPED
Start: 2021-04-15 | End: 2021-10-12 | Stop reason: ALTCHOICE

## 2021-04-15 RX ORDER — ROSUVASTATIN CALCIUM 20 MG/1
20 TABLET, COATED ORAL DAILY
Qty: 30 TABLET | Refills: 5 | Status: SHIPPED
Start: 2021-04-15 | End: 2021-10-12 | Stop reason: SDUPTHER

## 2021-04-15 ASSESSMENT — ENCOUNTER SYMPTOMS
VOMITING: 0
ABDOMINAL PAIN: 0
CONSTIPATION: 0
EYE PAIN: 0
SINUS PAIN: 0
SORE THROAT: 0
SHORTNESS OF BREATH: 0
BACK PAIN: 0
DIARRHEA: 0
COUGH: 0
NAUSEA: 0
TROUBLE SWALLOWING: 0
WHEEZING: 0
CHEST TIGHTNESS: 0

## 2021-04-15 NOTE — PROGRESS NOTES
4/15/21    Name: Azeem Schroeder  VLB:[id number]   Sex:female   Age:55 y.o. Chief Complaint   Patient presents with    Hypertension    Hyperlipidemia    ADHD    Depression     Patient presents to office for visit. She did have labs done. Patient says she never stopped the Allopurinol because she was too afraid her gout would return. She does not use the Symbicort everyday, but says it is effective when she uses it. Patient continues to have swelling and numbness and tingling at night in her feet with shooting pain. She is taking the Requip. Patient does not check her blood pressure at home. She has not had covid shot, but thinks she should get one. Here for check up    Labs done and reviwed  She is doing much bettter  More compliant with meds    htn stable  Blood pressures have been really good    Hyperlipidemia  Better with statin  No side effects    adhd stable  Taking adderall more regularly    Depression stable        Review of Systems   Constitutional: Negative for appetite change, fatigue and fever. HENT: Negative for congestion, ear pain, sinus pain, sore throat and trouble swallowing. Eyes: Negative for pain. Respiratory: Negative for cough, chest tightness, shortness of breath and wheezing. Cardiovascular: Negative for chest pain, palpitations and leg swelling. Gastrointestinal: Negative for abdominal pain, constipation, diarrhea, nausea and vomiting. Endocrine: Negative for cold intolerance and heat intolerance. Genitourinary: Negative for difficulty urinating, dysuria, frequency, hematuria, pelvic pain and urgency. Musculoskeletal: Positive for arthralgias and myalgias. Negative for back pain, gait problem and joint swelling. Skin: Negative for rash and wound. Neurological: Positive for numbness. Negative for dizziness, syncope, light-headedness and headaches. Hematological: Negative for adenopathy.    Psychiatric/Behavioral: Negative for confusion, dysphoric mood, self-injury, sleep disturbance and suicidal ideas. The patient is not nervous/anxious. Current Outpatient Medications:     budesonide-formoterol (SYMBICORT) 160-4.5 MCG/ACT AERO, Inhale 2 puffs into the lungs 2 times daily, Disp: 1 Inhaler, Rfl: 5    escitalopram (LEXAPRO) 20 MG tablet, Take 1.5 tablets by mouth daily, Disp: 45 tablet, Rfl: 5    losartan-hydroCHLOROthiazide (HYZAAR) 100-25 MG per tablet, Take 1 tablet by mouth daily, Disp: 30 tablet, Rfl: 5    rosuvastatin (CRESTOR) 20 MG tablet, Take 1 tablet by mouth daily, Disp: 30 tablet, Rfl: 5    topiramate (TOPAMAX) 25 MG tablet, Take 1 tablet by mouth nightly, Disp: 30 tablet, Rfl: 5    allopurinol (ZYLOPRIM) 300 MG tablet, take 1 tablet by mouth once daily, Disp: 30 tablet, Rfl: 5    [START ON 7/2/2021] amphetamine-dextroamphetamine (ADDERALL XR) 30 MG extended release capsule, Take 1 capsule by mouth daily for 30 days. , Disp: 30 capsule, Rfl: 0    rOPINIRole (REQUIP) 1 MG tablet, Take 1-2 tablets by mouth 3 times daily For feet and lower legs, Disp: 120 tablet, Rfl: 5  No Known Allergies   Past Medical History:   Diagnosis Date    ADHD (attention deficit hyperactivity disorder)     [facility]     Depression     GERD (gastroesophageal reflux disease)     Gout     Headache     migraines    Hyperlipidemia     Hypertension     Irritable bowel syndrome     Narcolepsy      Patient Active Problem List    Diagnosis Date Noted    Headache     [facility] 06/27/2019    ADHD (attention deficit hyperactivity disorder) 06/27/2019    Hyperlipidemia 06/27/2019    Depression 06/27/2019    Gout 06/27/2019    Essential hypertension 03/25/2015      Past Surgical History:   Procedure Laterality Date    TUBAL LIGATION        Social History     Tobacco History     Smoking Status  Current Every Day Smoker Smoking Frequency  1 pack/day for 42 years (42 pk yrs) Smoking Tobacco Type  Cigarettes    Smokeless Tobacco Use  Never Used          Alcohol History     Alcohol Use Status  Yes Comment  rarely          Drug Use     Drug Use Status  No          Sexual Activity     Sexually Active  Not Currently Comment              /74   Pulse 86   Temp 98.2 °F (36.8 °C)   Ht 5' 1\" (1.549 m)   Wt 227 lb 9.6 oz (103.2 kg)   SpO2 96%   BMI 43.00 kg/m²     EXAM:   Physical Exam  Vitals signs and nursing note reviewed. Constitutional:       General: She is not in acute distress. Appearance: Normal appearance. She is well-developed. She is obese. She is not ill-appearing. HENT:      Head: Normocephalic and atraumatic. Right Ear: Tympanic membrane normal.      Left Ear: Tympanic membrane normal.      Nose: Nose normal.      Mouth/Throat:      Mouth: Mucous membranes are moist.   Eyes:      Pupils: Pupils are equal, round, and reactive to light. Neck:      Musculoskeletal: Normal range of motion. Cardiovascular:      Rate and Rhythm: Normal rate and regular rhythm. Pulmonary:      Effort: Pulmonary effort is normal.      Breath sounds: Normal breath sounds. Abdominal:      General: Bowel sounds are normal.      Palpations: Abdomen is soft. Musculoskeletal:      Comments: Gait normal in the office today   Skin:     General: Skin is warm and dry. Neurological:      General: No focal deficit present. Mental Status: She is alert and oriented to person, place, and time. Psychiatric:         Mood and Affect: Mood normal.         Thought Content: Thought content normal.          Roland Sicard was seen today for hypertension, hyperlipidemia, adhd and depression. Diagnoses and all orders for this visit:    Encounter for well adult exam without abnormal findings    Essential hypertension  Comments:  well controlled  home readings good  continue meds    Dysthymia  Comments:  stable  no chnages  Orders:  -     escitalopram (LEXAPRO) 20 MG tablet;  Take 1.5 tablets by mouth daily  -     topiramate (TOPAMAX) 25 MG tablet; Take 1 tablet by mouth nightly    Current moderate episode of major depressive disorder without prior episode ([facility]  Comments:  stable  Orders:  -     escitalopram (LEXAPRO) 20 MG tablet; Take 1.5 tablets by mouth daily    Attention deficit hyperactivity disorder (ADHD), predominantly inattentive type  Comments:  still having issues, would like refills &agreed to 90dy appts  poor attention at work and this rreally helps her focus and get tasks done  med contract updated,  Orders:  -     Discontinue: amphetamine-dextroamphetamine (ADDERALL XR) 30 MG extended release capsule; Take 1 capsule by mouth daily for 30 days. -     Discontinue: amphetamine-dextroamphetamine (ADDERALL XR) 30 MG extended release capsule; Take 1 capsule by mouth daily for 30 days. -     amphetamine-dextroamphetamine (ADDERALL XR) 30 MG extended release capsule; Take 1 capsule by mouth daily for 30 days. Chronic obstructive pulmonary disease, unspecified COPD type ([facility]  Comments:  stable using symbicort as needed and for right now this works for her  did encourage her to quit smoking    Mixed hyperlipidemia  Comments:  tolerating statin   labs great    Other orders  -     budesonide-formoterol (SYMBICORT) 160-4.5 MCG/ACT AERO; Inhale 2 puffs into the lungs 2 times daily  -     losartan-hydroCHLOROthiazide (HYZAAR) 100-25 MG per tablet; Take 1 tablet by mouth daily  -     Discontinue: rOPINIRole (REQUIP) 0.5 MG tablet; Take 1 tablet by mouth 3 times daily For feet and lower legs  -     rosuvastatin (CRESTOR) 20 MG tablet; Take 1 tablet by mouth daily  -     allopurinol (ZYLOPRIM) 300 MG tablet; take 1 tablet by mouth once daily  -     rOPINIRole (REQUIP) 1 MG tablet;  Take 1-2 tablets by mouth 3 times daily For feet and lower legs    appt in 3 months  Labs reivewed      I independently reviewed and updated the chief complaint, HPI, past medical and surgical history, medications, allergies and ROS as entered by the LPN. Seen by:   Si Res, DO

## 2021-07-12 ENCOUNTER — VIRTUAL VISIT (OUTPATIENT)
Dept: FAMILY MEDICINE CLINIC | Age: 56
End: 2021-07-12
Payer: COMMERCIAL

## 2021-07-12 DIAGNOSIS — I10 ESSENTIAL HYPERTENSION: ICD-10-CM

## 2021-07-12 DIAGNOSIS — G60.9 IDIOPATHIC PERIPHERAL NEUROPATHY: ICD-10-CM

## 2021-07-12 DIAGNOSIS — M15.9 PRIMARY OSTEOARTHRITIS INVOLVING MULTIPLE JOINTS: ICD-10-CM

## 2021-07-12 DIAGNOSIS — G25.81 RESTLESS LEG SYNDROME: ICD-10-CM

## 2021-07-12 DIAGNOSIS — F90.0 ATTENTION DEFICIT HYPERACTIVITY DISORDER (ADHD), PREDOMINANTLY INATTENTIVE TYPE: Primary | ICD-10-CM

## 2021-07-12 DIAGNOSIS — E78.2 MIXED HYPERLIPIDEMIA: ICD-10-CM

## 2021-07-12 PROCEDURE — 99214 OFFICE O/P EST MOD 30 MIN: CPT | Performed by: FAMILY MEDICINE

## 2021-07-12 RX ORDER — DEXTROAMPHETAMINE SACCHARATE, AMPHETAMINE ASPARTATE MONOHYDRATE, DEXTROAMPHETAMINE SULFATE AND AMPHETAMINE SULFATE 7.5; 7.5; 7.5; 7.5 MG/1; MG/1; MG/1; MG/1
30 CAPSULE, EXTENDED RELEASE ORAL DAILY
Qty: 30 CAPSULE | Refills: 0 | Status: SHIPPED | OUTPATIENT
Start: 2021-09-14 | End: 2021-10-12 | Stop reason: SDUPTHER

## 2021-07-12 RX ORDER — POTASSIUM CHLORIDE 750 MG/1
10 TABLET, EXTENDED RELEASE ORAL DAILY
Qty: 90 TABLET | Refills: 1 | Status: SHIPPED
Start: 2021-07-12 | End: 2021-10-12 | Stop reason: SDUPTHER

## 2021-07-12 RX ORDER — DEXTROAMPHETAMINE SACCHARATE, AMPHETAMINE ASPARTATE MONOHYDRATE, DEXTROAMPHETAMINE SULFATE AND AMPHETAMINE SULFATE 7.5; 7.5; 7.5; 7.5 MG/1; MG/1; MG/1; MG/1
30 CAPSULE, EXTENDED RELEASE ORAL DAILY
Qty: 30 CAPSULE | Refills: 0 | Status: SHIPPED | OUTPATIENT
Start: 2021-08-14 | End: 2021-07-12 | Stop reason: SDUPTHER

## 2021-07-12 ASSESSMENT — ENCOUNTER SYMPTOMS
DIARRHEA: 0
CHEST TIGHTNESS: 0
SHORTNESS OF BREATH: 0
NAUSEA: 0
COUGH: 0
VOMITING: 0
CONSTIPATION: 0
BACK PAIN: 0
TROUBLE SWALLOWING: 0
ABDOMINAL PAIN: 0
SORE THROAT: 0
WHEEZING: 0
SINUS PAIN: 0
EYE PAIN: 0

## 2021-07-12 NOTE — PROGRESS NOTES
Codi Robbins (:  1965) is a 60 y.o. female,Established patient, here for evaluation of the following chief complaint(s): ADHD    Patient says her current dose of Adderall is effective. She would like refill. Patient has been taking 3 Requip nightly and is still having trouble with her feet. She says her feet only bother her at night when she is resting. She did have [employer] covid shot, but can not recall the date. ASSESSMENT/PLAN:  1. Attention deficit hyperactivity disorder (ADHD), predominantly inattentive type  Comments:  still having issues, would like refills &agreed to 90dy appts  poor attention at work and this rreally helps her focus and get tasks done  med contract updated,  Orders:  -     amphetamine-dextroamphetamine (ADDERALL XR) 30 MG extended release capsule; Take 1 capsule by mouth daily for 30 days. , Disp-30 capsule, R-0Print  2. Essential hypertension  Comments:  home readings good  continue meds  Orders:  -     CBC Auto Differential; Future  -     Comprehensive Metabolic Panel; Future  -     Microalbumin, Ur; Future  3. Mixed hyperlipidemia  -     Lipid Panel; Future  4. Idiopathic peripheral neuropathy  Comments:  try tumeric 2000mb twice a day  needs to hydrate though, not drinking enough fluids  5. Restless leg syndrome  Comments:  continue requi[ for now  reevaluate at appt in 3 months  6. Primary osteoarthritis involving multiple joints  Comments:  having hip pain  encouraged more exercise, hydrate  tumeric 2000mg bid  recheck in 3 months      Return in about 3 months (around 10/12/2021) for refill and repeat labs.        SUBJECTIVE/OBJECTIVE:  Saeed ledezma for check up and refill son adderall    She has been having a lot of trouble with her feet at night  Taking a ll of the requip at night and still having some issues  Cramping pain, stinging or tingling in feet  No burning but it keeps her awake  She has really cut back on pop but not drinking any water in place of it so  Now not atraumatic. Eyes:      Conjunctiva/sclera: Conjunctivae normal.      Pupils: Pupils are equal, round, and reactive to light. Pulmonary:      Effort: Pulmonary effort is normal. No respiratory distress. Musculoskeletal:      Comments: Gait steady , ambulated while on video visit   Neurological:      Mental Status: She is alert and oriented to person, place, and time. Mental status is at baseline. Psychiatric:         Mood and Affect: Mood normal.         Thought Content: Thought content normal.                   Yung Atkins, was evaluated through a synchronous (real-time) audio-video encounter. The patient (or guardian if applicable) is aware that this is a billable service. Verbal consent to proceed has been obtained within the past 12 months. The visit was conducted pursuant to the emergency declaration under the [address] [place] [address] authority and the [employer] and [employer] General Act. Patient identification was verified, and a caregiver was present when appropriate. The patient was located in a state where the provider was credentialed to provide care. An electronic signature was used to authenticate this note.     --Tracy Glover DO

## 2021-09-08 ENCOUNTER — TELEPHONE (OUTPATIENT)
Dept: FAMILY MEDICINE CLINIC | Age: 56
End: 2021-09-08

## 2021-09-08 NOTE — TELEPHONE ENCOUNTER
Shellie Soni calling about the Lexapro. She has been without it for 10 days because it needs a prior authorization. She said the pharmacy told her that they notified the PCP twice on the matter. I told her that I do not see that, but will get this message to the PCP.

## 2021-10-12 ENCOUNTER — OFFICE VISIT (OUTPATIENT)
Dept: FAMILY MEDICINE CLINIC | Age: 56
End: 2021-10-12
Payer: COMMERCIAL

## 2021-10-12 VITALS
TEMPERATURE: 97.9 F | OXYGEN SATURATION: 94 % | SYSTOLIC BLOOD PRESSURE: 128 MMHG | HEART RATE: 82 BPM | HEIGHT: 61 IN | DIASTOLIC BLOOD PRESSURE: 76 MMHG | WEIGHT: 226.2 LBS | BODY MASS INDEX: 42.71 KG/M2

## 2021-10-12 DIAGNOSIS — G25.81 RESTLESS LEG SYNDROME: ICD-10-CM

## 2021-10-12 DIAGNOSIS — M25.552 LEFT HIP PAIN: ICD-10-CM

## 2021-10-12 DIAGNOSIS — E78.2 MIXED HYPERLIPIDEMIA: ICD-10-CM

## 2021-10-12 DIAGNOSIS — M1A.00X0 IDIOPATHIC CHRONIC GOUT WITHOUT TOPHUS, UNSPECIFIED SITE: ICD-10-CM

## 2021-10-12 DIAGNOSIS — R51.9 CHRONIC NONINTRACTABLE HEADACHE, UNSPECIFIED HEADACHE TYPE: ICD-10-CM

## 2021-10-12 DIAGNOSIS — I10 ESSENTIAL HYPERTENSION: Primary | ICD-10-CM

## 2021-10-12 DIAGNOSIS — J44.9 CHRONIC OBSTRUCTIVE PULMONARY DISEASE, UNSPECIFIED COPD TYPE (HCC): ICD-10-CM

## 2021-10-12 DIAGNOSIS — G89.29 CHRONIC NONINTRACTABLE HEADACHE, UNSPECIFIED HEADACHE TYPE: ICD-10-CM

## 2021-10-12 DIAGNOSIS — Z23 NEED FOR VACCINATION: ICD-10-CM

## 2021-10-12 DIAGNOSIS — F90.0 ATTENTION DEFICIT HYPERACTIVITY DISORDER (ADHD), PREDOMINANTLY INATTENTIVE TYPE: ICD-10-CM

## 2021-10-12 DIAGNOSIS — G62.89 OTHER POLYNEUROPATHY: ICD-10-CM

## 2021-10-12 DIAGNOSIS — F32.1 CURRENT MODERATE EPISODE OF MAJOR DEPRESSIVE DISORDER WITHOUT PRIOR EPISODE (HCC): ICD-10-CM

## 2021-10-12 PROCEDURE — 99215 OFFICE O/P EST HI 40 MIN: CPT | Performed by: FAMILY MEDICINE

## 2021-10-12 PROCEDURE — 90674 CCIIV4 VAC NO PRSV 0.5 ML IM: CPT | Performed by: FAMILY MEDICINE

## 2021-10-12 PROCEDURE — 90471 IMMUNIZATION ADMIN: CPT | Performed by: FAMILY MEDICINE

## 2021-10-12 RX ORDER — VIT C/B6/B5/MAGNESIUM/HERB 173 50-5-6-5MG
2000 CAPSULE ORAL DAILY
COMMUNITY

## 2021-10-12 RX ORDER — DEXTROAMPHETAMINE SACCHARATE, AMPHETAMINE ASPARTATE MONOHYDRATE, DEXTROAMPHETAMINE SULFATE AND AMPHETAMINE SULFATE 7.5; 7.5; 7.5; 7.5 MG/1; MG/1; MG/1; MG/1
30 CAPSULE, EXTENDED RELEASE ORAL DAILY
Qty: 30 CAPSULE | Refills: 0 | Status: SHIPPED | OUTPATIENT
Start: 2021-10-21 | End: 2021-10-12 | Stop reason: SDUPTHER

## 2021-10-12 RX ORDER — ALLOPURINOL 300 MG/1
TABLET ORAL
Qty: 30 TABLET | Refills: 5 | Status: SHIPPED
Start: 2021-10-12 | End: 2022-07-18 | Stop reason: SDUPTHER

## 2021-10-12 RX ORDER — ESCITALOPRAM OXALATE 20 MG/1
30 TABLET ORAL DAILY
Qty: 45 TABLET | Refills: 5 | Status: SHIPPED
Start: 2021-10-12 | End: 2022-04-05 | Stop reason: ALTCHOICE

## 2021-10-12 RX ORDER — DEXTROAMPHETAMINE SACCHARATE, AMPHETAMINE ASPARTATE MONOHYDRATE, DEXTROAMPHETAMINE SULFATE AND AMPHETAMINE SULFATE 7.5; 7.5; 7.5; 7.5 MG/1; MG/1; MG/1; MG/1
30 CAPSULE, EXTENDED RELEASE ORAL DAILY
Qty: 30 CAPSULE | Refills: 0 | Status: SHIPPED | OUTPATIENT
Start: 2021-11-19 | End: 2021-10-12 | Stop reason: SDUPTHER

## 2021-10-12 RX ORDER — ROSUVASTATIN CALCIUM 20 MG/1
20 TABLET, COATED ORAL DAILY
Qty: 30 TABLET | Refills: 5 | Status: SHIPPED
Start: 2021-10-12 | End: 2022-07-18 | Stop reason: SDUPTHER

## 2021-10-12 RX ORDER — TOPIRAMATE 25 MG/1
25 TABLET ORAL NIGHTLY
Qty: 30 TABLET | Refills: 5 | Status: SHIPPED
Start: 2021-10-12 | End: 2022-04-05

## 2021-10-12 RX ORDER — DEXTROAMPHETAMINE SACCHARATE, AMPHETAMINE ASPARTATE MONOHYDRATE, DEXTROAMPHETAMINE SULFATE AND AMPHETAMINE SULFATE 7.5; 7.5; 7.5; 7.5 MG/1; MG/1; MG/1; MG/1
30 CAPSULE, EXTENDED RELEASE ORAL DAILY
Qty: 30 CAPSULE | Refills: 0 | Status: SHIPPED | OUTPATIENT
Start: 2021-12-17 | End: 2022-01-11 | Stop reason: SDUPTHER

## 2021-10-12 RX ORDER — GABAPENTIN 100 MG/1
100 CAPSULE ORAL 3 TIMES DAILY
Qty: 90 CAPSULE | Refills: 2 | Status: SHIPPED
Start: 2021-10-12 | End: 2022-01-11 | Stop reason: SDUPTHER

## 2021-10-12 RX ORDER — LOSARTAN POTASSIUM AND HYDROCHLOROTHIAZIDE 25; 100 MG/1; MG/1
1 TABLET ORAL DAILY
Qty: 30 TABLET | Refills: 5 | Status: SHIPPED
Start: 2021-10-12 | End: 2022-06-08

## 2021-10-12 RX ORDER — POTASSIUM CHLORIDE 750 MG/1
10 TABLET, EXTENDED RELEASE ORAL DAILY
Qty: 90 TABLET | Refills: 1 | Status: SHIPPED
Start: 2021-10-12 | End: 2022-07-18 | Stop reason: SDUPTHER

## 2021-10-12 RX ORDER — BUDESONIDE AND FORMOTEROL FUMARATE DIHYDRATE 160; 4.5 UG/1; UG/1
2 AEROSOL RESPIRATORY (INHALATION) 2 TIMES DAILY
Qty: 1 EACH | Refills: 5 | Status: SHIPPED
Start: 2021-10-12 | End: 2022-07-18 | Stop reason: SDUPTHER

## 2021-10-12 ASSESSMENT — ENCOUNTER SYMPTOMS
BACK PAIN: 0
CONSTIPATION: 0
SINUS PAIN: 0
NAUSEA: 0
SHORTNESS OF BREATH: 0
CHEST TIGHTNESS: 0
TROUBLE SWALLOWING: 0
SORE THROAT: 0
EYE PAIN: 0
COUGH: 0
DIARRHEA: 0
VOMITING: 0
WHEEZING: 0
ABDOMINAL PAIN: 0

## 2021-10-12 NOTE — PATIENT INSTRUCTIONS
Patient Education        Hip Arthritis: Exercises  Introduction  Here are some examples of exercises for you to try. The exercises may be suggested for a condition or for rehabilitation. Start each exercise slowly. Ease off the exercises if you start to have pain. You will be told when to start these exercises and which ones will work best for you. How to do the exercises  Straight-leg raises to the outside    1. Lie on your side, with your affected hip on top. 2. Tighten the front thigh muscles of your top leg to keep your knee straight. 3. Keep your hip and your leg straight in line with the rest of your body, and keep your knee pointing forward. Do not drop your hip back. 4. Lift your top leg straight up toward the ceiling, about 12 inches off the floor. Hold for about 6 seconds, then slowly lower your leg. 5. Repeat 8 to 12 times. 6. Switch legs and repeat steps 1 through 5, even if only one hip is sore. Straight-leg raises to the inside    1. Lie on your side with your affected hip on the floor. 2. You can either prop up your other leg on a chair, or you can bend that knee and put that foot in front of your other knee. Do not drop your hip back. 3. Tighten the muscles on the front thigh of your bottom leg to straighten that knee. 4. Keep your kneecap pointing forward and your leg straight, and lift your bottom leg up toward the ceiling about 6 inches. Hold for about 6 seconds, then lower slowly. 5. Repeat 8 to 12 times. 6. Switch legs and repeat steps 1 through 5, even if only one hip is sore. Hip hike    1. Stand sideways on the bottom step of a staircase, and hold on to the banister or wall. 2. Keeping both knees straight, lift your good leg off the step and let it hang down. Then hike your good hip up to the same level as your affected hip or a little higher. 3. Repeat 8 to 12 times. 4. Switch legs and repeat steps 1 through 3, even if only one hip is sore. Bridging    1.  Lie on your back with both knees bent. Your knees should be bent about 90 degrees. 2. Then push your feet into the floor, squeeze your buttocks, and lift your hips off the floor until your shoulders, hips, and knees are all in a straight line. 3. Hold for about 6 seconds as you continue to breathe normally, and then slowly lower your hips back down to the floor and rest for up to 10 seconds. 4. Repeat 8 to 12 times. Hamstring stretch (lying down)    1. Lie flat on your back with your legs straight. If you feel discomfort in your back, place a small towel roll under your lower back. 2. Holding the back of your affected leg, lift your leg straight up and toward your body until you feel a stretch at the back of your thigh. 3. Hold the stretch for at least 30 seconds. 4. Repeat 2 to 4 times. 5. Switch legs and repeat steps 1 through 4, even if only one hip is sore. Standing quadriceps stretch    1. If you are not steady on your feet, hold on to a chair, counter, or wall. You can also lie on your stomach or your side to do this exercise. 2. Bend the knee of the leg you want to stretch, and reach behind you to grab the front of your foot or ankle with the hand on the same side. For example, if you are stretching your right leg, use your right hand. 3. Keeping your knees next to each other, pull your foot toward your buttock until you feel a gentle stretch across the front of your hip and down the front of your thigh. Your knee should be pointed directly to the ground, and not out to the side. 4. Hold the stretch for at least 15 to 30 seconds. 5. Repeat 2 to 4 times. 6. Switch legs and repeat steps 1 through 5, even if only one hip is sore. Hip rotator stretch    1. Lie on your back with both knees bent and your feet flat on the floor. 2. Put the ankle of your affected leg on your opposite thigh near your knee.   3. Use your hand to gently push your knee away from your body until you feel a gentle stretch around your hip.  4. Hold the stretch for 15 to 30 seconds. 5. Repeat 2 to 4 times. 6. Repeat steps 1 through 5, but this time use your hand to gently pull your knee toward your opposite shoulder. 7. Switch legs and repeat steps 1 through 6, even if only one hip is sore. Knee-to-chest    1. Lie on your back with your knees bent and your feet flat on the floor. 2. Bring your affected leg to your chest, keeping the other foot flat on the floor (or keeping the other leg straight, whichever feels better on your lower back). 3. Keep your lower back pressed to the floor. Hold for at least 15 to 30 seconds. 4. Relax, and lower the knee to the starting position. 5. Repeat 2 to 4 times. 6. Switch legs and repeat steps 1 through 5, even if only one hip is sore. 7. To get more stretch, put your other leg flat on the floor while pulling your knee to your chest.  Clamshell    1. Lie on your side, with your affected hip on top. Keep your feet and knees together and your knees bent. 2. Raise your top knee, but keep your feet together. Do not let your hips roll back. Your legs should open up like a clamshell. 3. Hold for 6 seconds. 4. Slowly lower your knee back down. Rest for 10 seconds. 5. Repeat 8 to 12 times. 6. Switch legs and repeat steps 1 through 5, even if only one hip is sore. Follow-up care is a key part of your treatment and safety. Be sure to make and go to all appointments, and call your doctor if you are having problems. It's also a good idea to know your test results and keep a list of the medicines you take. Where can you learn more? Go to [email or web address] org and sign in to your [employer] account. Enter E080 in the [employer] box to learn more about \"Hip Arthritis: Exercises. \"     If you do not have an account, please click on the \"Sign Up Now\" link. Current as of: July 1, 2021               Content Version: 13.0  © 9348-5555 Healthwise, Incorporated.    Care instructions adapted under license by [facility] ([facility]). If you have questions about a medical condition or this instruction, always ask your healthcare professional. [address] any warranty or liability for your use of this information.

## 2021-10-12 NOTE — PROGRESS NOTES
10/12/21    Name: Jerardo Rodrigues  [facility]:[id number]   Sex:female   Age:55 y.o. Chief Complaint   Patient presents with    ADHD    Hypertension    Hip Pain     Patient presents to office for visit. She has been having pain in her right hip when walking for the past two months or so. Patient says her feet are completely numb and she notices no difference when taking the Requip. Patient says her breathing is stable. She needs refills on all of her medications. Patient did have labs done. She will have flu shot today. Patient here for check up today    She has been doing okay    She has been having more left hip pain the last few months  Steps definitely hurt, going up and down   Also after sitting for awhile t work it really bothers her and she has been getting up and down more walking a round butnot getting any better  She has not been doing anything at night due to this  No texercising nad not doing any home owrk or going anywhere    HTN stable  Readings have been ggod no changes    Lipids are worse on recent blood owrk  She eats once a day nad not usually vey healthy  Snacks a lot of thinks she thinks are healthy but they are not, crackers, pretzels etc  Taking her statin and still went up  We discussed diet at length and she will try to eat better but being a lone makes that difficult    Headaches   have been stable  No changes in their pattern    ADHD stable  Continue meds''    Restless leg and peripherla neuropathy is getting worse in both feet  They are keepign her awake a ttimes  She is not feeling the requip is helpig at all    Review of Systems   Constitutional: Negative for appetite change, fatigue and fever. HENT: Negative for congestion, ear pain, sinus pain, sore throat and trouble swallowing. Eyes: Negative for pain. Respiratory: Negative for cough, chest tightness, shortness of breath and wheezing. Cardiovascular: Negative for chest pain, palpitations and leg swelling.    Gastrointestinal: Negative for abdominal pain, constipation, diarrhea, nausea and vomiting. Endocrine: Negative for cold intolerance and heat intolerance. Genitourinary: Negative for difficulty urinating, dysuria, frequency, hematuria and pelvic pain. Musculoskeletal: Positive for arthralgias and myalgias. Negative for back pain, gait problem and joint swelling. Skin: Negative for rash and wound. Neurological: Positive for numbness. Negative for dizziness, syncope, light-headedness and headaches. Hematological: Negative for adenopathy. Psychiatric/Behavioral: Negative for confusion, dysphoric mood, self-injury, sleep disturbance and suicidal ideas. The patient is not nervous/anxious. Current Outpatient Medications:     turmeric 500 MG CAPS, Take 2,000 capsules by mouth daily, Disp: , Rfl:     allopurinol (ZYLOPRIM) 300 MG tablet, take 1 tablet by mouth once daily, Disp: 30 tablet, Rfl: 5    budesonide-formoterol (SYMBICORT) 160-4.5 MCG/ACT AERO, Inhale 2 puffs into the lungs 2 times daily, Disp: 1 each, Rfl: 5    escitalopram (LEXAPRO) 20 MG tablet, Take 1.5 tablets by mouth daily, Disp: 45 tablet, Rfl: 5    losartan-hydroCHLOROthiazide (HYZAAR) 100-25 MG per tablet, Take 1 tablet by mouth daily, Disp: 30 tablet, Rfl: 5    potassium chloride (KLOR-CON M) 10 MEQ extended release tablet, Take 1 tablet by mouth daily, Disp: 90 tablet, Rfl: 1    rosuvastatin (CRESTOR) 20 MG tablet, Take 1 tablet by mouth daily, Disp: 30 tablet, Rfl: 5    topiramate (TOPAMAX) 25 MG tablet, Take 1 tablet by mouth nightly, Disp: 30 tablet, Rfl: 5    gabapentin (NEURONTIN) 100 MG capsule, Take 1 capsule by mouth 3 times daily for 30 days. , Disp: 90 capsule, Rfl: 2    [START ON 12/17/2021] amphetamine-dextroamphetamine (ADDERALL XR) 30 MG extended release capsule, Take 1 capsule by mouth daily for 30 days. , Disp: 30 capsule, Rfl: 0  No Known Allergies   Past Medical History:   Diagnosis Date    ADHD (attention deficit hyperactivity disorder)     COPD (chronic obstructive pulmonary disease) (HCC)     Depression     GERD (gastroesophageal reflux disease)     Gout     Headache     migraines    Hyperlipidemia     Hypertension     Irritable bowel syndrome     Narcolepsy      Patient Active Problem List    Diagnosis Date Noted    Headache     COPD (chronic obstructive pulmonary disease) ([facility] [address] 06/27/2019    ADHD (attention deficit hyperactivity disorder) 06/27/2019    Hyperlipidemia 06/27/2019    Depression 06/27/2019    Gout 06/27/2019    Essential hypertension 03/25/2015      Past Surgical History:   Procedure Laterality Date    TUBAL LIGATION        Social History     Tobacco History     Smoking Status  Current Every Day Smoker Smoking Frequency  1 pack/day for 42 years (42 pk yrs) Smoking Tobacco Type  Cigarettes    Smokeless Tobacco Use  Never Used          Alcohol History     Alcohol Use Status  Yes Comment  rarely          Drug Use     Drug Use Status  No          Sexual Activity     Sexually Active  Not Currently Comment              /76   Pulse 82   Temp 97.9 °F (36.6 °C)   Ht 5' 1\" (1.549 m)   Wt 226 lb 3.2 oz (102.6 kg)   SpO2 94%   BMI 42.74 kg/m²     EXAM:   Physical Exam  Vitals and nursing note reviewed. Constitutional:       Appearance: Normal appearance. She is well-developed. HENT:      Head: Normocephalic and atraumatic. Right Ear: Tympanic membrane normal.      Left Ear: Tympanic membrane normal.      Nose: Nose normal.      Mouth/Throat:      Mouth: Mucous membranes are moist.   Eyes:      Pupils: Pupils are equal, round, and reactive to light. Cardiovascular:      Rate and Rhythm: Normal rate and regular rhythm. Pulmonary:      Effort: Pulmonary effort is normal.      Breath sounds: Normal breath sounds. Abdominal:      General: Bowel sounds are normal.      Palpations: Abdomen is soft. Musculoskeletal:      Cervical back: Normal range of motion.       Comments: Let hip tender anteriorly, slight decrease in ROm due to pain   Skin:     General: Skin is warm and dry. Neurological:      Mental Status: She is alert and oriented to person, place, and time. Mental status is at baseline. Psychiatric:         Mood and Affect: Mood normal.         Thought Content: Thought content normal.          Jasvir Jaquez was seen today for adhd, hypertension and hip pain. Diagnoses and all orders for this visit:    Essential hypertension  Comments:  well controlled  no chnages  continue medictions  Orders:  -     losartan-hydroCHLOROthiazide (HYZAAR) 100-25 MG per tablet; Take 1 tablet by mouth daily  -     potassium chloride (KLOR-CON M) 10 MEQ extended release tablet; Take 1 tablet by mouth daily    Mixed hyperlipidemia  Comments:  she is not watching her diet  taking crestor but lipid went up  counseled about diet  Orders:  -     rosuvastatin (CRESTOR) 20 MG tablet; Take 1 tablet by mouth daily    Current moderate episode of major depressive disorder without prior episode ([facility] [address]  Comments:  stable  Orders:  -     escitalopram (LEXAPRO) 20 MG tablet; Take 1.5 tablets by mouth daily    Need for vaccination  Comments:  flu shot today  Orders:  -     INFLUENZA, MDCK QUADV, 2 YRS AND OLDER, IM, PF, PREFILL SYR OR SDV, 0.5ML (FLUCELVAX QUADV, PF)    Left hip pain  Comments:  exercises given to the patient  gaapentin started for her ffeet  heat and topicals as needed, continue tumeric  Orders:  -     XR HIP 2-3 VW W PELVIS LEFT; Future    Attention deficit hyperactivity disorder (ADHD), predominantly inattentive type  Comments:  still having issues, would like refills &agreed to 90dy appts  poor attention at work and this rreally helps her focus and get tasks done  med contract updated,  Orders:  -     Discontinue: amphetamine-dextroamphetamine (ADDERALL XR) 30 MG extended release capsule; Take 1 capsule by mouth daily for 30 days.   -     Discontinue: amphetamine-dextroamphetamine (ADDERALL XR) 30 MG extended release capsule; Take 1 capsule by mouth daily for 30 days. -     amphetamine-dextroamphetamine (ADDERALL XR) 30 MG extended release capsule; Take 1 capsule by mouth daily for 30 days. Chronic nonintractable headache, unspecified headache type  Comments:  stable with topomax  she has been doing very well  Orders:  -     topiramate (TOPAMAX) 25 MG tablet; Take 1 tablet by mouth nightly    Idiopathic chronic gout without tophus, unspecified site  Comments:  stable  no changes  Orders:  -     allopurinol (ZYLOPRIM) 300 MG tablet; take 1 tablet by mouth once daily    Chronic obstructive pulmonary disease, unspecified COPD type ([facility] [address]  Comments:  stable with budesonidenhaler  no changes  Orders:  -     budesonide-formoterol (SYMBICORT) 160-4.5 MCG/ACT AERO; Inhale 2 puffs into the lungs 2 times daily    Restless leg syndrome  Comments:  requp not helping  we discussed at length and will try gabapentin, slow taper upward  Orders:  -     gabapentin (NEURONTIN) 100 MG capsule; Take 1 capsule by mouth 3 times daily for 30 days. Other polyneuropathy  Comments:  lower extremity  getting worse  try gabapentin, slow taper up and recheck in 3 onths  Orders:  -     gabapentin (NEURONTIN) 100 MG capsule; Take 1 capsule by mouth 3 times daily for 30 days. Other orders  -     Cancel: XR HIP 2-3 VW W PELVIS RIGHT; Future    spent 45 minutes with patient  Going over meical problems  Peripheral neuropathy and current treatments treatments we can try  And left hip pain  xrays reviewed withher      I independently reviewed and updated the chief complaint, HPI, past medical and surgical history, medications, allergies and ROS as entered by the LPN. Seen by:   Tonya Mckeon DO

## 2022-01-11 ENCOUNTER — VIRTUAL VISIT (OUTPATIENT)
Dept: FAMILY MEDICINE CLINIC | Age: 57
End: 2022-01-11
Payer: COMMERCIAL

## 2022-01-11 DIAGNOSIS — G62.89 OTHER POLYNEUROPATHY: ICD-10-CM

## 2022-01-11 DIAGNOSIS — E78.2 MIXED HYPERLIPIDEMIA: Primary | ICD-10-CM

## 2022-01-11 DIAGNOSIS — F90.0 ATTENTION DEFICIT HYPERACTIVITY DISORDER (ADHD), PREDOMINANTLY INATTENTIVE TYPE: ICD-10-CM

## 2022-01-11 DIAGNOSIS — G25.81 RESTLESS LEG SYNDROME: ICD-10-CM

## 2022-01-11 PROCEDURE — 99214 OFFICE O/P EST MOD 30 MIN: CPT | Performed by: FAMILY MEDICINE

## 2022-01-11 RX ORDER — GABAPENTIN 100 MG/1
CAPSULE ORAL
Qty: 120 CAPSULE | Refills: 2 | Status: SHIPPED
Start: 2022-01-11 | End: 2022-04-05 | Stop reason: SDUPTHER

## 2022-01-11 RX ORDER — DEXTROAMPHETAMINE SACCHARATE, AMPHETAMINE ASPARTATE MONOHYDRATE, DEXTROAMPHETAMINE SULFATE AND AMPHETAMINE SULFATE 7.5; 7.5; 7.5; 7.5 MG/1; MG/1; MG/1; MG/1
30 CAPSULE, EXTENDED RELEASE ORAL DAILY
Qty: 30 CAPSULE | Refills: 0 | Status: SHIPPED
Start: 2022-01-11 | End: 2022-01-11 | Stop reason: SDUPTHER

## 2022-01-11 RX ORDER — DEXTROAMPHETAMINE SACCHARATE, AMPHETAMINE ASPARTATE MONOHYDRATE, DEXTROAMPHETAMINE SULFATE AND AMPHETAMINE SULFATE 7.5; 7.5; 7.5; 7.5 MG/1; MG/1; MG/1; MG/1
30 CAPSULE, EXTENDED RELEASE ORAL DAILY
Qty: 30 CAPSULE | Refills: 0 | Status: SHIPPED | OUTPATIENT
Start: 2022-02-09 | End: 2022-01-11 | Stop reason: SDUPTHER

## 2022-01-11 RX ORDER — DEXTROAMPHETAMINE SACCHARATE, AMPHETAMINE ASPARTATE MONOHYDRATE, DEXTROAMPHETAMINE SULFATE AND AMPHETAMINE SULFATE 7.5; 7.5; 7.5; 7.5 MG/1; MG/1; MG/1; MG/1
30 CAPSULE, EXTENDED RELEASE ORAL DAILY
Qty: 30 CAPSULE | Refills: 0 | Status: SHIPPED | OUTPATIENT
Start: 2022-03-09 | End: 2022-04-05 | Stop reason: SDUPTHER

## 2022-01-11 ASSESSMENT — PATIENT HEALTH QUESTIONNAIRE - PHQ9
10. IF YOU CHECKED OFF ANY PROBLEMS, HOW DIFFICULT HAVE THESE PROBLEMS MADE IT FOR YOU TO DO YOUR WORK, TAKE CARE OF THINGS AT HOME, OR GET ALONG WITH OTHER PEOPLE: 0
2. FEELING DOWN, DEPRESSED OR HOPELESS: 0
5. POOR APPETITE OR OVEREATING: 0
4. FEELING TIRED OR HAVING LITTLE ENERGY: 0
1. LITTLE INTEREST OR PLEASURE IN DOING THINGS: 0
SUM OF ALL RESPONSES TO PHQ QUESTIONS 1-9: 0
SUM OF ALL RESPONSES TO PHQ9 QUESTIONS 1 & 2: 0
6. FEELING BAD ABOUT YOURSELF - OR THAT YOU ARE A FAILURE OR HAVE LET YOURSELF OR YOUR FAMILY DOWN: 0
9. THOUGHTS THAT YOU WOULD BE BETTER OFF DEAD, OR OF HURTING YOURSELF: 0
SUM OF ALL RESPONSES TO PHQ QUESTIONS 1-9: 0
3. TROUBLE FALLING OR STAYING ASLEEP: 0
SUM OF ALL RESPONSES TO PHQ QUESTIONS 1-9: 0
7. TROUBLE CONCENTRATING ON THINGS, SUCH AS READING THE NEWSPAPER OR WATCHING TELEVISION: 0
SUM OF ALL RESPONSES TO PHQ QUESTIONS 1-9: 0
8. MOVING OR SPEAKING SO SLOWLY THAT OTHER PEOPLE COULD HAVE NOTICED. OR THE OPPOSITE, BEING SO FIGETY OR RESTLESS THAT YOU HAVE BEEN MOVING AROUND A LOT MORE THAN USUAL: 0

## 2022-01-11 ASSESSMENT — ENCOUNTER SYMPTOMS
ABDOMINAL PAIN: 0
WHEEZING: 0
SHORTNESS OF BREATH: 0
EYE PAIN: 0
SORE THROAT: 0
BACK PAIN: 0
NAUSEA: 0
SINUS PAIN: 0
TROUBLE SWALLOWING: 0
DIARRHEA: 0
CHEST TIGHTNESS: 0
CONSTIPATION: 0
COUGH: 0
VOMITING: 0

## 2022-01-11 NOTE — PROGRESS NOTES
Shubham iMllan (:  1965) is a 60 y.o. female,Established patient, here for evaluation of the following chief complaint(s): ADHD and Peripheral Neuropathy    Patient was not able to fill last script for adderall at the end of December because she was on vacation. Patient is taking Gabapentin. She will need refills for adderall and gabapentin. ASSESSMENT/PLAN:  1. Mixed hyperlipidemia  -     CBC Auto Differential; Future  -     Comprehensive Metabolic Panel; Future  -     Lipid Panel; Future  2. Restless leg syndrome  Comments:  requp not helping  we discussed at length and will try gabapentin, slow taper upward  Orders:  -     gabapentin (NEURONTIN) 100 MG capsule; 1 in the morning and 3 in the evening, Disp-120 capsule, R-2Normal  3. Other polyneuropathy  Comments:  lower extremity  getting worse  try gabapentin, slow taper up and recheck in 3 onths  Orders:  -     gabapentin (NEURONTIN) 100 MG capsule; 1 in the morning and 3 in the evening, Disp-120 capsule, R-2Normal  4. Attention deficit hyperactivity disorder (ADHD), predominantly inattentive type  Comments:  still having issues, would like refills &agreed to 90dy appts  poor attention at work and this rreally helps her focus and get tasks done  med contract updated,  Orders:  -     amphetamine-dextroamphetamine (ADDERALL XR) 30 MG extended release capsule; Take 1 capsule by mouth daily for 30 days. , Disp-30 capsule, R-0Print      No follow-ups on file.        SUBJECTIVE/OBJECTIVE:  Patient here for check up  Needs refills  oarrs reviwed    periopheral neuropathy has been much better witwh the gabapentin  But there are still some night she will not sleep very well  Will increase to 300mg at bedtime and stay on 100mg during the day  Recheck in a few onths    adhd has been stable as well  Does much btter when on her adderall  She has been off for a week due to [address] holiday and already forgetting to do things at work  Will refill    HTN stable  Readings when she check it have been very good'no changes needed    hyperlipdiemia  She is working on diet'  Trying to improve things  Will need labs in 3 onths          Review of Systems   Constitutional: Negative for appetite change, fatigue and fever. HENT: Negative for congestion, ear pain, sinus pain, sore throat and trouble swallowing. Eyes: Negative for pain. Respiratory: Negative for cough, chest tightness, shortness of breath and wheezing. Cardiovascular: Negative for chest pain, palpitations and leg swelling. Gastrointestinal: Negative for abdominal pain, constipation, diarrhea, nausea and vomiting. Endocrine: Negative for cold intolerance and heat intolerance. Genitourinary: Negative for difficulty urinating, hematuria and pelvic pain. Musculoskeletal: Negative for back pain, gait problem and joint swelling. Skin: Negative for rash and wound. Neurological: Negative for dizziness, syncope and headaches. Hematological: Negative for adenopathy. Psychiatric/Behavioral: Negative for confusion, sleep disturbance and suicidal ideas. No flowsheet data found. Physical Exam  Vitals and nursing note reviewed. Constitutional:       General: She is not in acute distress. Appearance: Normal appearance. She is not ill-appearing. HENT:      Head: Normocephalic and atraumatic. Eyes:      Conjunctiva/sclera: Conjunctivae normal.      Pupils: Pupils are equal, round, and reactive to light. Pulmonary:      Effort: Pulmonary effort is normal. No respiratory distress. Musculoskeletal:      Cervical back: Normal range of motion. Skin:     General: Skin is warm and dry. Findings: No rash. Neurological:      Mental Status: She is alert and oriented to person, place, and time. Mental status is at baseline. Psychiatric:         Mood and Affect: Mood normal.         Thought Content:  Thought content normal.                   Florencia Mendieta was evaluated through a synchronous (real-time) audio-video encounter. The patient (or guardian if applicable) is aware that this is a billable service. Verbal consent to proceed has been obtained within the past 12 months. The visit was conducted pursuant to the emergency declaration under the [facility], [address] [place] [address] authority and the [employer] and [employer] General Act. Patient identification was verified, and a caregiver was present when appropriate. The patient was located in a state where the provider was credentialed to provide care. An electronic signature was used to authenticate this note.     --Enedelia Quinn DO

## 2022-04-04 DIAGNOSIS — E78.2 MIXED HYPERLIPIDEMIA: ICD-10-CM

## 2022-04-04 LAB
ALBUMIN SERPL-MCNC: 4.2 G/DL (ref 3.5–5.2)
ALP BLD-CCNC: 117 U/L (ref 35–104)
ALT SERPL-CCNC: 16 U/L (ref 0–32)
ANION GAP SERPL CALCULATED.3IONS-SCNC: 19 MMOL/L (ref 7–16)
AST SERPL-CCNC: 17 U/L (ref 0–31)
BASOPHILS ABSOLUTE: 0.07 E9/L (ref 0–0.2)
BASOPHILS RELATIVE PERCENT: 0.8 % (ref 0–2)
BILIRUB SERPL-MCNC: 0.3 MG/DL (ref 0–1.2)
BUN BLDV-MCNC: 13 MG/DL (ref 6–20)
CALCIUM SERPL-MCNC: 10.1 MG/DL (ref 8.6–10.2)
CHLORIDE BLD-SCNC: 98 MMOL/L (ref 98–107)
CHOLESTEROL, TOTAL: 167 MG/DL (ref 0–199)
CO2: 27 MMOL/L (ref 22–29)
CREAT SERPL-MCNC: 0.9 MG/DL (ref 0.5–1)
EOSINOPHILS ABSOLUTE: 0.26 E9/L (ref 0.05–0.5)
EOSINOPHILS RELATIVE PERCENT: 2.9 % (ref 0–6)
GFR AFRICAN AMERICAN: >60
GFR NON-AFRICAN AMERICAN: >60 ML/MIN/1.73
GLUCOSE BLD-MCNC: 108 MG/DL (ref 74–99)
HCT VFR BLD CALC: 54.1 % (ref 34–48)
HDLC SERPL-MCNC: 64 MG/DL
HEMOGLOBIN: 18.1 G/DL (ref 11.5–15.5)
IMMATURE GRANULOCYTES #: 0.02 E9/L
IMMATURE GRANULOCYTES %: 0.2 % (ref 0–5)
LDL CHOLESTEROL CALCULATED: 65 MG/DL (ref 0–99)
LYMPHOCYTES ABSOLUTE: 2.33 E9/L (ref 1.5–4)
LYMPHOCYTES RELATIVE PERCENT: 25.6 % (ref 20–42)
MCH RBC QN AUTO: 32.9 PG (ref 26–35)
MCHC RBC AUTO-ENTMCNC: 33.5 % (ref 32–34.5)
MCV RBC AUTO: 98.4 FL (ref 80–99.9)
MONOCYTES ABSOLUTE: 0.5 E9/L (ref 0.1–0.95)
MONOCYTES RELATIVE PERCENT: 5.5 % (ref 2–12)
NEUTROPHILS ABSOLUTE: 5.91 E9/L (ref 1.8–7.3)
NEUTROPHILS RELATIVE PERCENT: 65 % (ref 43–80)
PDW BLD-RTO: 13.7 FL (ref 11.5–15)
PLATELET # BLD: 229 E9/L (ref 130–450)
PMV BLD AUTO: 10.2 FL (ref 7–12)
POTASSIUM SERPL-SCNC: 3.9 MMOL/L (ref 3.5–5)
RBC # BLD: 5.5 E12/L (ref 3.5–5.5)
SODIUM BLD-SCNC: 144 MMOL/L (ref 132–146)
TOTAL PROTEIN: 7.5 G/DL (ref 6.4–8.3)
TRIGL SERPL-MCNC: 191 MG/DL (ref 0–149)
VLDLC SERPL CALC-MCNC: 38 MG/DL
WBC # BLD: 9.1 E9/L (ref 4.5–11.5)

## 2022-04-05 ENCOUNTER — OFFICE VISIT (OUTPATIENT)
Dept: FAMILY MEDICINE CLINIC | Age: 57
End: 2022-04-05
Payer: COMMERCIAL

## 2022-04-05 VITALS
DIASTOLIC BLOOD PRESSURE: 70 MMHG | HEART RATE: 80 BPM | TEMPERATURE: 98.2 F | BODY MASS INDEX: 44.37 KG/M2 | OXYGEN SATURATION: 96 % | WEIGHT: 235 LBS | SYSTOLIC BLOOD PRESSURE: 126 MMHG | HEIGHT: 61 IN

## 2022-04-05 DIAGNOSIS — E78.2 MIXED HYPERLIPIDEMIA: ICD-10-CM

## 2022-04-05 DIAGNOSIS — F32.1 CURRENT MODERATE EPISODE OF MAJOR DEPRESSIVE DISORDER WITHOUT PRIOR EPISODE (HCC): ICD-10-CM

## 2022-04-05 DIAGNOSIS — G62.89 OTHER POLYNEUROPATHY: ICD-10-CM

## 2022-04-05 DIAGNOSIS — I10 ESSENTIAL HYPERTENSION: ICD-10-CM

## 2022-04-05 DIAGNOSIS — G25.81 RESTLESS LEG SYNDROME: ICD-10-CM

## 2022-04-05 DIAGNOSIS — Z00.01 ENCOUNTER FOR WELL ADULT EXAM WITH ABNORMAL FINDINGS: Primary | ICD-10-CM

## 2022-04-05 DIAGNOSIS — F90.0 ATTENTION DEFICIT HYPERACTIVITY DISORDER (ADHD), PREDOMINANTLY INATTENTIVE TYPE: ICD-10-CM

## 2022-04-05 PROCEDURE — 99396 PREV VISIT EST AGE 40-64: CPT | Performed by: FAMILY MEDICINE

## 2022-04-05 RX ORDER — DULOXETIN HYDROCHLORIDE 60 MG/1
60 CAPSULE, DELAYED RELEASE ORAL 2 TIMES DAILY
Qty: 60 CAPSULE | Refills: 2 | Status: SHIPPED
Start: 2022-04-05 | End: 2022-06-30

## 2022-04-05 RX ORDER — DEXTROAMPHETAMINE SACCHARATE, AMPHETAMINE ASPARTATE MONOHYDRATE, DEXTROAMPHETAMINE SULFATE AND AMPHETAMINE SULFATE 7.5; 7.5; 7.5; 7.5 MG/1; MG/1; MG/1; MG/1
30 CAPSULE, EXTENDED RELEASE ORAL DAILY
Qty: 30 CAPSULE | Refills: 0 | Status: SHIPPED | OUTPATIENT
Start: 2022-06-19 | End: 2022-07-18 | Stop reason: SDUPTHER

## 2022-04-05 RX ORDER — DEXTROAMPHETAMINE SACCHARATE, AMPHETAMINE ASPARTATE MONOHYDRATE, DEXTROAMPHETAMINE SULFATE AND AMPHETAMINE SULFATE 7.5; 7.5; 7.5; 7.5 MG/1; MG/1; MG/1; MG/1
30 CAPSULE, EXTENDED RELEASE ORAL DAILY
Qty: 30 CAPSULE | Refills: 0 | Status: SHIPPED | OUTPATIENT
Start: 2022-04-23 | End: 2022-04-05 | Stop reason: SDUPTHER

## 2022-04-05 RX ORDER — DEXTROAMPHETAMINE SACCHARATE, AMPHETAMINE ASPARTATE MONOHYDRATE, DEXTROAMPHETAMINE SULFATE AND AMPHETAMINE SULFATE 7.5; 7.5; 7.5; 7.5 MG/1; MG/1; MG/1; MG/1
30 CAPSULE, EXTENDED RELEASE ORAL DAILY
Qty: 30 CAPSULE | Refills: 0 | Status: SHIPPED | OUTPATIENT
Start: 2022-05-21 | End: 2022-04-05 | Stop reason: SDUPTHER

## 2022-04-05 RX ORDER — GABAPENTIN 100 MG/1
CAPSULE ORAL
Qty: 120 CAPSULE | Refills: 2 | Status: SHIPPED
Start: 2022-04-05 | End: 2022-07-18 | Stop reason: SDUPTHER

## 2022-04-05 ASSESSMENT — ENCOUNTER SYMPTOMS
VOMITING: 0
DIARRHEA: 0
ABDOMINAL PAIN: 0
CHEST TIGHTNESS: 0
NAUSEA: 0
COUGH: 0
SORE THROAT: 0
WHEEZING: 0
EYE PAIN: 0
SINUS PAIN: 0
BACK PAIN: 0
TROUBLE SWALLOWING: 0
SHORTNESS OF BREATH: 1
CONSTIPATION: 0

## 2022-04-05 NOTE — PROGRESS NOTES
4/5/22    Name: Yessy Thomson  CARLOS:[id number]   Sex:female   Age:56 y.o. Chief Complaint   Patient presents with    Hypertension    Hyperlipidemia    ADHD    Peripheral Neuropathy     Patient presents to office for visit. She did have labs done. Patient stopped taking Topamax, she didn't really notice an improvement in her headaches and she doesn't like how much medication she has to take. Patient says the gabapentin does help with the pain she has in her feet and legs but it does not improve the numbness. She feels as if one of her feet is colder than other. She feels as if her depression may be worsening. She is tired all of the time and she doesn't want to do anything, especially on the weekends. Patient says she only uses the symbicort once in a while because it is expensive and makes her feel jittery so she does feel short of breath. Patient here for a check up and refills    Her depression is getting worse'  We increased the lexapro to 30mg but it is not working  She stays in bed all weekend when not working  [employer] home from work and goes to [employer] very down  Does not see any friends, never goes out to eat  Does not even want to get groceries'  We discussed other options will stop the [address] off every other day over 2 weeks  And start cymbalt now'daily in the morning 60mg x 1 week then increase to bid right away  Hopefully this will help  She will update us in 4 weeks how she is feeling    Migraines have been stable  topomax was not helpful so she stopped it  No changes at this time  'need to get her depression under control    Copd, not really stable  Still smoking and not using her symbicort  She uses it as needed  Encouraged her to cut back on tobacco use    adhd has beeen stable  Still working and functioning thereokay, likely b/c she has to pay bills  oarrs reviwed and refills okay      Review of Systems   Constitutional: Positive for fatigue. Negative for appetite change and fever. HENT: Negative for congestion, ear pain, sinus pain, sore throat and trouble swallowing. Eyes: Negative for pain. Respiratory: Positive for shortness of breath. Negative for cough, chest tightness and wheezing. Cardiovascular: Negative for chest pain, palpitations and leg swelling. Gastrointestinal: Negative for abdominal pain, constipation, diarrhea, nausea and vomiting. Endocrine: Negative for cold intolerance and heat intolerance. Genitourinary: Negative for difficulty urinating, dysuria, frequency, hematuria and pelvic pain. Musculoskeletal: Negative for arthralgias, back pain, gait problem, joint swelling and myalgias. Skin: Negative for rash and wound. Neurological: Positive for numbness. Negative for dizziness, syncope, light-headedness and headaches. Hematological: Negative for adenopathy. Psychiatric/Behavioral: Positive for dysphoric mood. Negative for confusion, self-injury, sleep disturbance and suicidal ideas. The patient is not nervous/anxious. Current Outpatient Medications:     gabapentin (NEURONTIN) 100 MG capsule, 1 in the morning and 3 in the evening, Disp: 120 capsule, Rfl: 2    [START ON 6/19/2022] amphetamine-dextroamphetamine (ADDERALL XR) 30 MG extended release capsule, Take 1 capsule by mouth daily for 30 days. , Disp: 30 capsule, Rfl: 0    DULoxetine (CYMBALTA) 60 MG extended release capsule, Take 1 capsule by mouth 2 times daily, Disp: 60 capsule, Rfl: 2    turmeric 500 MG CAPS, Take 2,000 capsules by mouth daily, Disp: , Rfl:     allopurinol (ZYLOPRIM) 300 MG tablet, take 1 tablet by mouth once daily, Disp: 30 tablet, Rfl: 5    budesonide-formoterol (SYMBICORT) 160-4.5 MCG/ACT AERO, Inhale 2 puffs into the lungs 2 times daily, Disp: 1 each, Rfl: 5    losartan-hydroCHLOROthiazide (HYZAAR) 100-25 MG per tablet, Take 1 tablet by mouth daily, Disp: 30 tablet, Rfl: 5    potassium chloride (KLOR-CON M) 10 MEQ extended release tablet, Take 1 tablet by mouth daily, Disp: 90 tablet, Rfl: 1    rosuvastatin (CRESTOR) 20 MG tablet, Take 1 tablet by mouth daily, Disp: 30 tablet, Rfl: 5  No Known Allergies   Past Medical History:   Diagnosis Date    ADHD (attention deficit hyperactivity disorder)     COPD [facility]chronic obstructive pulmonary disease) ([facility]     Depression     GERD (gastroesophageal reflux disease)     Gout     Headache     migraines    Hyperlipidemia     Hypertension     Irritable bowel syndrome     Narcolepsy      Patient Active Problem List    Diagnosis Date Noted    Headache     [facility]) [facility] 06/27/2019    ADHD (attention deficit hyperactivity disorder) 06/27/2019    Hyperlipidemia 06/27/2019    Depression 06/27/2019    Gout 06/27/2019    Essential hypertension 03/25/2015      Past Surgical History:   Procedure Laterality Date    TUBAL LIGATION        Social History     Tobacco History     Smoking Status  Current Every Day Smoker Smoking Frequency  1 pack/day for 42 years (42 pk yrs) Smoking Tobacco Type  Cigarettes    Smokeless Tobacco Use  Never Used          Alcohol History     Alcohol Use Status  Yes Comment  rarely          Drug Use     Drug Use Status  No          Sexual Activity     Sexually Active  Not Currently Comment              /70   Pulse 80   Temp 98.2 °F (36.8 °C)   Ht 5' 1\" (1.549 m)   Wt 235 lb (106.6 kg)   SpO2 96%   BMI 44.40 kg/m²     EXAM:   Physical Exam  Vitals and nursing note reviewed. Constitutional:       General: She is not in acute distress. Appearance: She is well-developed. She is not ill-appearing. HENT:      Head: Normocephalic and atraumatic. Right Ear: Tympanic membrane normal.      Left Ear: Tympanic membrane normal.      Nose: Nose normal.      Mouth/Throat:      Mouth: Mucous membranes are moist.   Eyes:      Pupils: Pupils are equal, round, and reactive to light.    Cardiovascular:      Rate and Rhythm: Normal rate and regular rhythm. Pulmonary:      Effort: Pulmonary effort is normal.      Breath sounds: Normal breath sounds. Abdominal:      General: Bowel sounds are normal.      Palpations: Abdomen is soft. Musculoskeletal:      Cervical back: Normal range of motion. Comments: Gait steady in the office today   Skin:     General: Skin is warm and dry. Neurological:      Mental Status: She is alert and oriented to person, place, and time. Mental status is at baseline. Psychiatric:         Mood and Affect: Mood normal.         Thought Content: Thought content normal.          Shani Mon was seen today for hypertension, hyperlipidemia, adhd and peripheral neuropathy. Diagnoses and all orders for this visit:    Encounter for well adult exam with abnormal findings    Attention deficit hyperactivity disorder (ADHD), predominantly inattentive type  Comments:  back on the adderall and doing much better  able to work, oarrs reviwed and refill sokay  but now depression is getting worse and that is affecting work  Orders:  -     Discontinue: amphetamine-dextroamphetamine (ADDERALL XR) 30 MG extended release capsule; Take 1 capsule by mouth daily for 30 days. -     Discontinue: amphetamine-dextroamphetamine (ADDERALL XR) 30 MG extended release capsule; Take 1 capsule by mouth daily for 30 days. -     amphetamine-dextroamphetamine (ADDERALL XR) 30 MG extended release capsule; Take 1 capsule by mouth daily for 30 days. Current moderate episode of major depressive disorder without prior episode ([address]  Comments:  sarkar snot feel the lexapro is working  wean off over the next 2wk  start cymbalta 60mg qd x 1 week then bid  Orders:  -     DULoxetine (CYMBALTA) 60 MG extended release capsule;  Take 1 capsule by mouth 2 times daily    Restless leg syndrome  Comments:  along with neuropathy  gabapentin has been helping  oarrs reviwed will continue this  Orders:  -     gabapentin (NEURONTIN) 100 MG capsule; 1 in the morning and 3 in the evening    Other polyneuropathy  Comments:  lower extremity  gabapentin has been helping  comntinue this no changes  Orders:  -     gabapentin (NEURONTIN) 100 MG capsule; 1 in the morning and 3 in the evening    Essential hypertension  Comments:  reading good today  she does not check it  no changes  recommended to watch diet, low salt    Mixed hyperlipidemia  Comments:  labs reviwed much better'  taking statin with no issues        I independently reviewed and updated the chief complaint, HPI, past medical and surgical history, medications, allergies and ROS as entered by the LPN. Seen by:   Akin Soriano, DO

## 2022-06-08 DIAGNOSIS — I10 ESSENTIAL HYPERTENSION: ICD-10-CM

## 2022-06-08 RX ORDER — LOSARTAN POTASSIUM AND HYDROCHLOROTHIAZIDE 25; 100 MG/1; MG/1
TABLET ORAL
Qty: 30 TABLET | Refills: 1 | Status: SHIPPED
Start: 2022-06-08 | End: 2022-07-18 | Stop reason: SDUPTHER

## 2022-06-08 NOTE — TELEPHONE ENCOUNTER
Last Appointment:  4/5/2022  Future Appointments   Date Time Provider Ham Arteaga   7/18/2022 11:30 AM Marli Bills  [address]

## 2022-06-30 DIAGNOSIS — F32.1 CURRENT MODERATE EPISODE OF MAJOR DEPRESSIVE DISORDER WITHOUT PRIOR EPISODE (HCC): ICD-10-CM

## 2022-06-30 RX ORDER — DULOXETIN HYDROCHLORIDE 60 MG/1
CAPSULE, DELAYED RELEASE ORAL
Qty: 60 CAPSULE | Refills: 0 | Status: SHIPPED
Start: 2022-06-30 | End: 2022-07-18 | Stop reason: SDUPTHER

## 2022-07-09 DIAGNOSIS — I10 ESSENTIAL HYPERTENSION: ICD-10-CM

## 2022-07-11 RX ORDER — POTASSIUM CHLORIDE 750 MG/1
TABLET, EXTENDED RELEASE ORAL
Qty: 90 TABLET | Refills: 1 | OUTPATIENT
Start: 2022-07-11

## 2022-07-12 DIAGNOSIS — G25.81 RESTLESS LEG SYNDROME: ICD-10-CM

## 2022-07-12 DIAGNOSIS — G62.89 OTHER POLYNEUROPATHY: ICD-10-CM

## 2022-07-12 RX ORDER — GABAPENTIN 100 MG/1
CAPSULE ORAL
Qty: 120 CAPSULE | Refills: 2 | OUTPATIENT
Start: 2022-07-12

## 2022-07-18 ENCOUNTER — TELEMEDICINE (OUTPATIENT)
Dept: FAMILY MEDICINE CLINIC | Age: 57
End: 2022-07-18
Payer: COMMERCIAL

## 2022-07-18 DIAGNOSIS — Z12.31 ENCOUNTER FOR SCREENING MAMMOGRAM FOR MALIGNANT NEOPLASM OF BREAST: ICD-10-CM

## 2022-07-18 DIAGNOSIS — G25.81 RESTLESS LEG SYNDROME: ICD-10-CM

## 2022-07-18 DIAGNOSIS — I10 ESSENTIAL HYPERTENSION: Primary | ICD-10-CM

## 2022-07-18 DIAGNOSIS — G62.89 OTHER POLYNEUROPATHY: ICD-10-CM

## 2022-07-18 DIAGNOSIS — M1A.00X0 IDIOPATHIC CHRONIC GOUT WITHOUT TOPHUS, UNSPECIFIED SITE: ICD-10-CM

## 2022-07-18 DIAGNOSIS — E78.2 MIXED HYPERLIPIDEMIA: ICD-10-CM

## 2022-07-18 DIAGNOSIS — J44.9 CHRONIC OBSTRUCTIVE PULMONARY DISEASE, UNSPECIFIED COPD TYPE (HCC): ICD-10-CM

## 2022-07-18 DIAGNOSIS — Z12.11 SCREEN FOR COLON CANCER: ICD-10-CM

## 2022-07-18 DIAGNOSIS — F32.1 CURRENT MODERATE EPISODE OF MAJOR DEPRESSIVE DISORDER WITHOUT PRIOR EPISODE (HCC): ICD-10-CM

## 2022-07-18 DIAGNOSIS — F90.0 ATTENTION DEFICIT HYPERACTIVITY DISORDER (ADHD), PREDOMINANTLY INATTENTIVE TYPE: ICD-10-CM

## 2022-07-18 PROCEDURE — 99214 OFFICE O/P EST MOD 30 MIN: CPT | Performed by: FAMILY MEDICINE

## 2022-07-18 RX ORDER — DEXTROAMPHETAMINE SACCHARATE, AMPHETAMINE ASPARTATE MONOHYDRATE, DEXTROAMPHETAMINE SULFATE AND AMPHETAMINE SULFATE 7.5; 7.5; 7.5; 7.5 MG/1; MG/1; MG/1; MG/1
30 CAPSULE, EXTENDED RELEASE ORAL DAILY
Qty: 30 CAPSULE | Refills: 0 | Status: SHIPPED | OUTPATIENT
Start: 2022-08-24 | End: 2022-07-18 | Stop reason: SDUPTHER

## 2022-07-18 RX ORDER — ALLOPURINOL 300 MG/1
TABLET ORAL
Qty: 30 TABLET | Refills: 5 | Status: SHIPPED
Start: 2022-07-18 | End: 2022-10-19 | Stop reason: SDUPTHER

## 2022-07-18 RX ORDER — DEXTROAMPHETAMINE SACCHARATE, AMPHETAMINE ASPARTATE MONOHYDRATE, DEXTROAMPHETAMINE SULFATE AND AMPHETAMINE SULFATE 7.5; 7.5; 7.5; 7.5 MG/1; MG/1; MG/1; MG/1
30 CAPSULE, EXTENDED RELEASE ORAL DAILY
Qty: 30 CAPSULE | Refills: 0 | Status: SHIPPED | OUTPATIENT
Start: 2022-09-22 | End: 2022-09-07 | Stop reason: SDUPTHER

## 2022-07-18 RX ORDER — ROSUVASTATIN CALCIUM 20 MG/1
20 TABLET, COATED ORAL DAILY
Qty: 30 TABLET | Refills: 5 | Status: SHIPPED
Start: 2022-07-18 | End: 2022-10-19 | Stop reason: SDUPTHER

## 2022-07-18 RX ORDER — DEXTROAMPHETAMINE SACCHARATE, AMPHETAMINE ASPARTATE MONOHYDRATE, DEXTROAMPHETAMINE SULFATE AND AMPHETAMINE SULFATE 7.5; 7.5; 7.5; 7.5 MG/1; MG/1; MG/1; MG/1
30 CAPSULE, EXTENDED RELEASE ORAL DAILY
Qty: 30 CAPSULE | Refills: 0 | Status: SHIPPED
Start: 2022-07-18 | End: 2022-07-18 | Stop reason: SDUPTHER

## 2022-07-18 RX ORDER — GABAPENTIN 100 MG/1
CAPSULE ORAL
Qty: 120 CAPSULE | Refills: 2 | Status: SHIPPED | OUTPATIENT
Start: 2022-07-18 | End: 2022-09-15

## 2022-07-18 RX ORDER — DULOXETIN HYDROCHLORIDE 60 MG/1
CAPSULE, DELAYED RELEASE ORAL
Qty: 60 CAPSULE | Refills: 5 | Status: SHIPPED
Start: 2022-07-18 | End: 2022-10-19 | Stop reason: SDUPTHER

## 2022-07-18 RX ORDER — BUDESONIDE AND FORMOTEROL FUMARATE DIHYDRATE 160; 4.5 UG/1; UG/1
2 AEROSOL RESPIRATORY (INHALATION) 2 TIMES DAILY
Qty: 1 EACH | Refills: 5 | Status: SHIPPED
Start: 2022-07-18 | End: 2022-10-19 | Stop reason: SDUPTHER

## 2022-07-18 RX ORDER — POTASSIUM CHLORIDE 750 MG/1
10 TABLET, EXTENDED RELEASE ORAL DAILY
Qty: 90 TABLET | Refills: 1 | Status: SHIPPED
Start: 2022-07-18 | End: 2022-10-19 | Stop reason: SDUPTHER

## 2022-07-18 RX ORDER — LOSARTAN POTASSIUM AND HYDROCHLOROTHIAZIDE 25; 100 MG/1; MG/1
TABLET ORAL
Qty: 30 TABLET | Refills: 5 | Status: SHIPPED
Start: 2022-07-18 | End: 2022-10-19 | Stop reason: SDUPTHER

## 2022-07-18 ASSESSMENT — ENCOUNTER SYMPTOMS
DIARRHEA: 0
COUGH: 0
WHEEZING: 0
CONSTIPATION: 0
BACK PAIN: 0
VOMITING: 0
NAUSEA: 0
SINUS PAIN: 0
SORE THROAT: 0
TROUBLE SWALLOWING: 0
ABDOMINAL PAIN: 0
CHEST TIGHTNESS: 0
EYE PAIN: 0
SHORTNESS OF BREATH: 0

## 2022-07-18 NOTE — PROGRESS NOTES
Praneeth Rios (:  1965) is a Established patient, here for evaluation of the following:    Assessment & Plan   Below is the assessment and plan developed based on review of pertinent history, physical exam, labs, studies, and medications. 1. Essential hypertension  Comments:  well controlled  no chnages  continue medictions  Orders:  -     potassium chloride (KLOR-CON M) 10 MEQ extended release tablet; Take 1 tablet by mouth in the morning., Disp-90 tablet, R-1Normal  -     losartan-hydroCHLOROthiazide (HYZAAR) 100-25 MG per tablet; take 1 tablet by mouth once daily, Disp-30 tablet, R-5Normal  -     CBC with Auto Differential; Future  -     Comprehensive Metabolic Panel; Future  -     Microalbumin, Ur; Future  2. Mixed hyperlipidemia  Comments:  she is not watching her diet  taking crestor but lipid went up  counseled about diet  Orders:  -     rosuvastatin (CRESTOR) 20 MG tablet; Take 1 tablet by mouth in the morning., Disp-30 tablet, R-5Normal  -     CBC with Auto Differential; Future  -     Comprehensive Metabolic Panel; Future  -     Lipid Panel; Future  3. Current moderate episode of major depressive disorder without prior episode ([facility]  Comments:  sarkar snot feel the lexapro is working  wean off over the next 2wk  start cymbalta 60mg qd x 1 week then bid  Orders:  -     DULoxetine (CYMBALTA) 60 MG extended release capsule; take 1 capsule by mouth twice a day, Disp-60 capsule, R-5Normal  4. Chronic obstructive pulmonary disease, unspecified COPD type ([facility]  Comments:  stable with budesonidenhaler  no changes  Orders:  -     budesonide-formoterol (SYMBICORT) 160-4.5 MCG/ACT AERO; Inhale 2 puffs into the lungs in the morning and 2 puffs before bedtime. , Disp-1 each, R-5Normal  5.  Idiopathic chronic gout without tophus, unspecified site  Comments:  stable  no changes  Orders:  -     allopurinol (ZYLOPRIM) 300 MG tablet; take 1 tablet by mouth once daily, Disp-30 tablet, R-5Normal  -     Uric Acid; Future  6. Encounter for screening mammogram for malignant neoplasm of breast  Comments:  agreed tos [place]  it has been 2 years  Orders:  -     IGLESIA AMANDA DIGITAL SCREEN BILATERAL PER PROTOCOL; Future  7. Restless leg syndrome  Comments:  along with neuropathy  gabapentin has been helping  oarrs reviwed will continue this  Orders:  -     gabapentin (NEURONTIN) 100 MG capsule; 1 in the morning and 3 in the evening, Disp-120 capsule, R-2Normal  8. Other polyneuropathy  Comments:  lower extremity  gabapentin has been helping  comntinue this no changes  Orders:  -     gabapentin (NEURONTIN) 100 MG capsule; 1 in the morning and 3 in the evening, Disp-120 capsule, R-2Normal  9. Attention deficit hyperactivity disorder (ADHD), predominantly inattentive type  Comments:  back on the adderall and doing much better  able to work, oarrs reviwed and refill sokay  but now depression is getting worse and that is affecting work  Orders:  -     amphetamine-dextroamphetamine (ADDERALL XR) 30 MG extended release capsule; Take 1 capsule by mouth in the morning for 30 days. , Disp-30 capsule, R-0Print  10. Screen for colon cancer  -     Fecal DNA Colorectal cancer screening ([employer])    Return in about 3 months (around 10/18/2022) for labs prior, mammo ordered.        Subjective   Here for check up  Doing well  No complaints    Adhd stable with 30mg daily  She does occasionally for get it on the weekends''    Diabetes  Stable and neuropathy from it is better with the gabapenitn  She is dealingmuch better'  Moving more b/c pain is better    HTN  Readings have been stable  No issues  Taking meds no side effects  Trying to move  more  Still sits at a desk for work but has been getting up out og it more pften    COPD  Still smoking  Stable no recent flare ups  No recnet steroid use  No shortness of breath no new cough    Depression doing well with cymbalta  She has been feeling pretty good        Review of Systems   Constitutional: Negative for appetite change, fatigue and fever. HENT:  Negative for congestion, ear pain, sinus pain, sore throat and trouble swallowing. Eyes:  Negative for pain. Respiratory:  Negative for cough, chest tightness, shortness of breath and wheezing. Cardiovascular:  Negative for chest pain, palpitations and leg swelling. Gastrointestinal:  Negative for abdominal pain, constipation, diarrhea, nausea and vomiting. Endocrine: Negative for cold intolerance and heat intolerance. Genitourinary:  Negative for difficulty urinating, hematuria and pelvic pain. Musculoskeletal:  Negative for back pain, gait problem and joint swelling. Skin:  Negative for rash and wound. Neurological:  Negative for dizziness, syncope and headaches. Hematological:  Negative for adenopathy. Psychiatric/Behavioral:  Negative for confusion, sleep disturbance and suicidal ideas. Objective   Patient-Reported Vitals  Patient-Reported Systolic (Top): 293 mmHg  Patient-Reported Diastolic (Bottom): 70 mmHg  BP Observations: Yes, BP was taken on electronic monitoring device with digital readout     Physical Exam  Vitals and nursing note reviewed. Constitutional:       General: She is not in acute distress. Appearance: She is well-developed. She is not ill-appearing. HENT:      Head: Normocephalic and atraumatic. Eyes:      Pupils: Pupils are equal, round, and reactive to light. Pulmonary:      Effort: Pulmonary effort is normal. No respiratory distress. Musculoskeletal:      Cervical back: Normal range of motion. Skin:     General: Skin is warm and dry. Neurological:      Mental Status: She is alert and oriented to person, place, and time. Mental status is at baseline. Psychiatric:         Mood and Affect: Mood normal.         Thought Content: Thought content normal.                Anam Tucker, was evaluated through a synchronous (real-time) audio-video encounter.  The patient (or guardian if applicable) is aware that this is a billable service, which includes applicable co-pays. This Virtual Visit was conducted with patient's (and/or legal guardian's) consent. The visit was conducted pursuant to the emergency declaration under the [facility], [address] [place] [address] waiver authority and the [employer] and [email or web address] General Act. Patient identification was verified, and a caregiver was present when appropriate. The patient was located at Home: [address] [place] [address] [phone]. Provider was located at [facility] (Appt Dept): [address] [place], [address].         --Harrell Dakin, DO

## 2022-09-07 ENCOUNTER — TELEPHONE (OUTPATIENT)
Dept: FAMILY MEDICINE CLINIC | Age: 57
End: 2022-09-07

## 2022-09-07 DIAGNOSIS — F90.0 ATTENTION DEFICIT HYPERACTIVITY DISORDER (ADHD), PREDOMINANTLY INATTENTIVE TYPE: ICD-10-CM

## 2022-09-07 RX ORDER — DEXTROAMPHETAMINE SACCHARATE, AMPHETAMINE ASPARTATE MONOHYDRATE, DEXTROAMPHETAMINE SULFATE AND AMPHETAMINE SULFATE 7.5; 7.5; 7.5; 7.5 MG/1; MG/1; MG/1; MG/1
30 CAPSULE, EXTENDED RELEASE ORAL DAILY
Qty: 30 CAPSULE | Refills: 0 | Status: SHIPPED
Start: 2022-09-22 | End: 2022-10-19 | Stop reason: SDUPTHER

## 2022-09-07 NOTE — TELEPHONE ENCOUNTER
They were mailed after her last VV. Did she not get them. So its 2 weeks past when she was due to fill it I guess based off her oarrs. It is schedule 2 and there is a new law  It has to be eRx thru the chart now  We can no longer print them anyway.   I will send directly to her pharmacy locally for her    Thank you

## 2022-10-14 DIAGNOSIS — M1A.00X0 IDIOPATHIC CHRONIC GOUT WITHOUT TOPHUS, UNSPECIFIED SITE: ICD-10-CM

## 2022-10-14 DIAGNOSIS — E78.2 MIXED HYPERLIPIDEMIA: ICD-10-CM

## 2022-10-14 DIAGNOSIS — I10 ESSENTIAL HYPERTENSION: ICD-10-CM

## 2022-10-14 LAB
ALBUMIN SERPL-MCNC: 4 G/DL (ref 3.5–5.2)
ALP BLD-CCNC: 112 U/L (ref 35–104)
ALT SERPL-CCNC: 11 U/L (ref 0–32)
ANION GAP SERPL CALCULATED.3IONS-SCNC: 18 MMOL/L (ref 7–16)
AST SERPL-CCNC: 18 U/L (ref 0–31)
BASOPHILS ABSOLUTE: 0.04 E9/L (ref 0–0.2)
BASOPHILS RELATIVE PERCENT: 0.5 % (ref 0–2)
BILIRUB SERPL-MCNC: 0.3 MG/DL (ref 0–1.2)
BUN BLDV-MCNC: 16 MG/DL (ref 6–20)
CALCIUM SERPL-MCNC: 9.8 MG/DL (ref 8.6–10.2)
CHLORIDE BLD-SCNC: 98 MMOL/L (ref 98–107)
CHOLESTEROL, TOTAL: 194 MG/DL (ref 0–199)
CO2: 27 MMOL/L (ref 22–29)
CREAT SERPL-MCNC: 0.9 MG/DL (ref 0.5–1)
EOSINOPHILS ABSOLUTE: 0.21 E9/L (ref 0.05–0.5)
EOSINOPHILS RELATIVE PERCENT: 2.5 % (ref 0–6)
GFR AFRICAN AMERICAN: >60
GFR NON-AFRICAN AMERICAN: >60 ML/MIN/1.73
GLUCOSE BLD-MCNC: 84 MG/DL (ref 74–99)
HCT VFR BLD CALC: 52.7 % (ref 34–48)
HDLC SERPL-MCNC: 51 MG/DL
HEMOGLOBIN: 17.9 G/DL (ref 11.5–15.5)
IMMATURE GRANULOCYTES #: 0.01 E9/L
IMMATURE GRANULOCYTES %: 0.1 % (ref 0–5)
LDL CHOLESTEROL CALCULATED: 102 MG/DL (ref 0–99)
LYMPHOCYTES ABSOLUTE: 2 E9/L (ref 1.5–4)
LYMPHOCYTES RELATIVE PERCENT: 23.8 % (ref 20–42)
MCH RBC QN AUTO: 35.2 PG (ref 26–35)
MCHC RBC AUTO-ENTMCNC: 34 % (ref 32–34.5)
MCV RBC AUTO: 103.7 FL (ref 80–99.9)
MONOCYTES ABSOLUTE: 0.37 E9/L (ref 0.1–0.95)
MONOCYTES RELATIVE PERCENT: 4.4 % (ref 2–12)
NEUTROPHILS ABSOLUTE: 5.76 E9/L (ref 1.8–7.3)
NEUTROPHILS RELATIVE PERCENT: 68.7 % (ref 43–80)
PDW BLD-RTO: 13.8 FL (ref 11.5–15)
PLATELET # BLD: 234 E9/L (ref 130–450)
PMV BLD AUTO: 10.2 FL (ref 7–12)
POTASSIUM SERPL-SCNC: 4.6 MMOL/L (ref 3.5–5)
RBC # BLD: 5.08 E12/L (ref 3.5–5.5)
SODIUM BLD-SCNC: 143 MMOL/L (ref 132–146)
TOTAL PROTEIN: 7.5 G/DL (ref 6.4–8.3)
TRIGL SERPL-MCNC: 205 MG/DL (ref 0–149)
URIC ACID, SERUM: 5.9 MG/DL (ref 2.4–5.7)
VLDLC SERPL CALC-MCNC: 41 MG/DL
WBC # BLD: 8.4 E9/L (ref 4.5–11.5)

## 2022-10-17 DIAGNOSIS — G62.89 OTHER POLYNEUROPATHY: ICD-10-CM

## 2022-10-17 DIAGNOSIS — G25.81 RESTLESS LEG SYNDROME: ICD-10-CM

## 2022-10-17 RX ORDER — GABAPENTIN 100 MG/1
CAPSULE ORAL
Qty: 120 CAPSULE | Refills: 2 | OUTPATIENT
Start: 2022-10-17

## 2022-10-18 LAB — MICROALBUMIN UR-MCNC: 278.1 MG/L

## 2022-10-19 ENCOUNTER — OFFICE VISIT (OUTPATIENT)
Dept: FAMILY MEDICINE CLINIC | Age: 57
End: 2022-10-19
Payer: COMMERCIAL

## 2022-10-19 VITALS
OXYGEN SATURATION: 89 % | HEART RATE: 100 BPM | WEIGHT: 239.8 LBS | BODY MASS INDEX: 45.27 KG/M2 | DIASTOLIC BLOOD PRESSURE: 78 MMHG | SYSTOLIC BLOOD PRESSURE: 112 MMHG | HEIGHT: 61 IN | TEMPERATURE: 97.9 F

## 2022-10-19 DIAGNOSIS — I10 ESSENTIAL HYPERTENSION: ICD-10-CM

## 2022-10-19 DIAGNOSIS — F32.1 CURRENT MODERATE EPISODE OF MAJOR DEPRESSIVE DISORDER WITHOUT PRIOR EPISODE (HCC): ICD-10-CM

## 2022-10-19 DIAGNOSIS — F90.0 ATTENTION DEFICIT HYPERACTIVITY DISORDER (ADHD), PREDOMINANTLY INATTENTIVE TYPE: ICD-10-CM

## 2022-10-19 DIAGNOSIS — Z23 NEED FOR IMMUNIZATION AGAINST INFLUENZA: ICD-10-CM

## 2022-10-19 DIAGNOSIS — E78.2 MIXED HYPERLIPIDEMIA: ICD-10-CM

## 2022-10-19 DIAGNOSIS — M1A.00X0 IDIOPATHIC CHRONIC GOUT WITHOUT TOPHUS, UNSPECIFIED SITE: ICD-10-CM

## 2022-10-19 DIAGNOSIS — G43.009 MIGRAINE WITHOUT AURA AND WITHOUT STATUS MIGRAINOSUS, NOT INTRACTABLE: ICD-10-CM

## 2022-10-19 DIAGNOSIS — J44.9 CHRONIC OBSTRUCTIVE PULMONARY DISEASE, UNSPECIFIED COPD TYPE (HCC): Primary | ICD-10-CM

## 2022-10-19 PROCEDURE — 99214 OFFICE O/P EST MOD 30 MIN: CPT | Performed by: FAMILY MEDICINE

## 2022-10-19 PROCEDURE — 90471 IMMUNIZATION ADMIN: CPT | Performed by: FAMILY MEDICINE

## 2022-10-19 PROCEDURE — 90674 CCIIV4 VAC NO PRSV 0.5 ML IM: CPT | Performed by: FAMILY MEDICINE

## 2022-10-19 RX ORDER — BUDESONIDE AND FORMOTEROL FUMARATE DIHYDRATE 160; 4.5 UG/1; UG/1
2 AEROSOL RESPIRATORY (INHALATION) 2 TIMES DAILY
Qty: 1 EACH | Refills: 5 | Status: SHIPPED | OUTPATIENT
Start: 2022-10-19

## 2022-10-19 RX ORDER — LOSARTAN POTASSIUM AND HYDROCHLOROTHIAZIDE 25; 100 MG/1; MG/1
TABLET ORAL
Qty: 30 TABLET | Refills: 5 | Status: SHIPPED | OUTPATIENT
Start: 2022-10-19

## 2022-10-19 RX ORDER — DULOXETIN HYDROCHLORIDE 60 MG/1
CAPSULE, DELAYED RELEASE ORAL
Qty: 60 CAPSULE | Refills: 5 | Status: SHIPPED | OUTPATIENT
Start: 2022-10-19

## 2022-10-19 RX ORDER — ROSUVASTATIN CALCIUM 20 MG/1
20 TABLET, COATED ORAL DAILY
Qty: 30 TABLET | Refills: 5 | Status: SHIPPED | OUTPATIENT
Start: 2022-10-19

## 2022-10-19 RX ORDER — POTASSIUM CHLORIDE 750 MG/1
10 TABLET, EXTENDED RELEASE ORAL DAILY
Qty: 90 TABLET | Refills: 1 | Status: SHIPPED | OUTPATIENT
Start: 2022-10-19

## 2022-10-19 RX ORDER — ALLOPURINOL 300 MG/1
TABLET ORAL
Qty: 30 TABLET | Refills: 5 | Status: SHIPPED | OUTPATIENT
Start: 2022-10-19

## 2022-10-19 RX ORDER — DEXTROAMPHETAMINE SACCHARATE, AMPHETAMINE ASPARTATE MONOHYDRATE, DEXTROAMPHETAMINE SULFATE AND AMPHETAMINE SULFATE 7.5; 7.5; 7.5; 7.5 MG/1; MG/1; MG/1; MG/1
30 CAPSULE, EXTENDED RELEASE ORAL DAILY
Qty: 90 CAPSULE | Refills: 0 | Status: SHIPPED | OUTPATIENT
Start: 2022-10-19 | End: 2023-01-17

## 2022-10-19 ASSESSMENT — ENCOUNTER SYMPTOMS
EYE PAIN: 0
CONSTIPATION: 0
COUGH: 0
DIARRHEA: 0
TROUBLE SWALLOWING: 0
NAUSEA: 0
ABDOMINAL PAIN: 0
SINUS PAIN: 0
VOMITING: 0
WHEEZING: 0
SHORTNESS OF BREATH: 1
BACK PAIN: 0
SORE THROAT: 0
CHEST TIGHTNESS: 0

## 2022-10-19 NOTE — PROGRESS NOTES
10/19/22    Name: Tere Cabrera  CPP:[id number]   Sex:female   Age:56 y.o. Chief Complaint   Patient presents with    Hypertension    ADHD    Peripheral Neuropathy    Hyperlipidemia    Depression    Migraine     Patient presents to office for visit. She did have labs done. Patient has been having some shortness of breath lately. She is using her symbicort. Patient has been working double shifts since summer and she is exhausted. She says her double shifts should end after this week. Patient will have flu shot today. [facility] Here for refills and check up    Copd has been worse lately but she got out of the habit of using her inhaler nad started wheezingmore  She is using it more often for the last 2 weeks and sees some imprvement  Refuses oxygen at home  She needs to be using the symbivort twice a day    Migraine  She is still getting headaches  Taking motrin multiple times a week for her headaches  She was given topomax for the chronic headaches b/c she was having more then 15 headaches days a month  But she had side effects so she stopped it, did not let us know this, just started taking more motrin  She  has taken zomig, imitrex and amitriptyline in the past for them without any relief'  The motrin does not get rid of the headaches  Did advise this is causing her kidney disease  Will try rimegepant if her insurance will approve it but no more then 8 pills  amonth'  And no motrin    HTN  Stable  Readings have been very good  No issues  No changes in  meds    ADD  Still on adderall and doing well  No issues'  She feels it still helps with wirk    Lipids good  Taking statin  Tolerating it well, no side effects    Review of Systems   Constitutional:  Negative for appetite change, fatigue and fever. HENT:  Negative for congestion, ear pain, sinus pain, sore throat and trouble swallowing. Eyes:  Negative for pain.    Respiratory:  Positive for shortness of breath. Negative for cough, chest tightness and wheezing. Cardiovascular:  Negative for chest pain, palpitations and leg swelling. Gastrointestinal:  Negative for abdominal pain, constipation, diarrhea, nausea and vomiting. Endocrine: Negative for cold intolerance and heat intolerance. Genitourinary:  Negative for difficulty urinating, hematuria and pelvic pain. Musculoskeletal:  Negative for back pain, gait problem and joint swelling. Skin:  Negative for rash and wound. Neurological:  Negative for dizziness, syncope, light-headedness and headaches. Hematological:  Negative for adenopathy. Psychiatric/Behavioral:  Negative for confusion, sleep disturbance and suicidal ideas. Current Outpatient Medications:     allopurinol (ZYLOPRIM) 300 MG tablet, take 1 tablet by mouth once daily, Disp: 30 tablet, Rfl: 5    budesonide-formoterol (SYMBICORT) 160-4.5 MCG/ACT AERO, Inhale 2 puffs into the lungs 2 times daily, Disp: 1 each, Rfl: 5    DULoxetine (CYMBALTA) 60 MG extended release capsule, take 1 capsule by mouth twice a day, Disp: 60 capsule, Rfl: 5    losartan-hydroCHLOROthiazide (HYZAAR) 100-25 MG per tablet, take 1 tablet by mouth once daily, Disp: 30 tablet, Rfl: 5    potassium chloride (KLOR-CON M) 10 MEQ extended release tablet, Take 1 tablet by mouth daily, Disp: 90 tablet, Rfl: 1    rosuvastatin (CRESTOR) 20 MG tablet, Take 1 tablet by mouth daily, Disp: 30 tablet, Rfl: 5    Rimegepant Sulfate 75 MG TBDP, Take 75 mg by mouth daily as needed (migraine), Disp: 8 tablet, Rfl: 5    amphetamine-dextroamphetamine (ADDERALL XR) 30 MG extended release capsule, Take 1 capsule by mouth daily for 90 days. , Disp: 90 capsule, Rfl: 0    gabapentin (NEURONTIN) 100 MG capsule, 1 in the morning and 3 in the evening, Disp: 120 capsule, Rfl: 2    turmeric 500 MG CAPS, Take 2,000 capsules by mouth daily, Disp: , Rfl:   No Known Allergies   Past Medical History:   Diagnosis Date    ADHD (attention deficit hyperactivity disorder)     COPD (chronic obstructive pulmonary disease) (HCC)     Depression     GERD (gastroesophageal reflux disease)     Gout     Headache     migraines    Hyperlipidemia     Hypertension     Irritable bowel syndrome     Narcolepsy      Patient Active Problem List    Diagnosis Date Noted    Migraine without aura and without status migrainosus, not intractable 10/19/2022    Headache     COPD (chronic obstructive pulmonary disease) ([facility] 06/27/2019    ADHD (attention deficit hyperactivity disorder) 06/27/2019    Hyperlipidemia 06/27/2019    Depression 06/27/2019    Gout 06/27/2019    Essential hypertension 03/25/2015      Past Surgical History:   Procedure Laterality Date    TUBAL LIGATION        Social History       Tobacco History       Smoking Status  Every Day Smoking Frequency  1 pack/day for 42.00 years (42.00 pk-yrs) Smoking Tobacco Type  Cigarettes      Smokeless Tobacco Use  Never              Alcohol History       Alcohol Use Status  Yes Comment  rarely              Drug Use       Drug Use Status  No              Sexual Activity       Sexually Active  Not Currently Comment                  /78   Pulse 100   Temp 97.9 °F (36.6 °C)   Ht 5' 1\" (1.549 m)   Wt 239 lb 12.8 oz (108.8 kg)   SpO2 (!) 89%   BMI 45.31 kg/m²     EXAM:   Physical Exam  Vitals and nursing note reviewed. Constitutional:       General: She is not in acute distress. Appearance: She is well-developed. She is obese. She is not ill-appearing or toxic-appearing. HENT:      Head: Normocephalic and atraumatic. Right Ear: Tympanic membrane normal.      Left Ear: Tympanic membrane normal.      Nose: Nose normal.      Mouth/Throat:      Mouth: Mucous membranes are moist.   Eyes:      Pupils: Pupils are equal, round, and reactive to light. Cardiovascular:      Rate and Rhythm: Normal rate and regular rhythm. Pulmonary:      Effort: Pulmonary effort is normal. No respiratory distress.       Breath sounds: No wheezing or rhonchi. Comments: Decreased breath sound through out  Abdominal:      General: Bowel sounds are normal.      Palpations: Abdomen is soft. Musculoskeletal:      Cervical back: Normal range of motion. Comments: Gait steady   Skin:     General: Skin is warm and dry. Neurological:      Mental Status: She is alert and oriented to person, place, and time. Psychiatric:         Mood and Affect: Mood normal.         Thought Content: Thought content normal.         Judgment: Judgment normal.        Tere Paul was seen today for hypertension, adhd, peripheral neuropathy, hyperlipidemia, depression and migraine. Diagnoses and all orders for this visit:    Chronic obstructive pulmonary disease, unspecified [facility]  Comments:  needs to use budesonide inhaler daily  having more issues lately with SOB  no changes  refuses oxygen  quit tobacco!!!!  Orders:  -     budesonide-formoterol (SYMBICORT) 160-4.5 MCG/ACT AERO; Inhale 2 puffs into the lungs 2 times daily    Essential hypertension  Comments:  well controlled  no chnages  continue medictions  Orders:  -     losartan-hydroCHLOROthiazide (HYZAAR) 100-25 MG per tablet; take 1 tablet by mouth once daily  -     potassium chloride (KLOR-CON M) 10 MEQ extended release tablet; Take 1 tablet by mouth daily    Migraine without aura and without status migrainosus, not intractable  Comments:  stop motrin causing kidney damage  try rimegepant just as needed  failed imitrex, zomig, toppomax side effects- amitriotyline too sedating  Orders:  -     Rimegepant Sulfate 75 MG TBDP;  Take 75 mg by mouth daily as needed (migraine)    Attention deficit hyperactivity disorder (ADHD), predominantly inattentive type  Comments:  back on the adderall and doing much better  able to work, oarrs reviwed and refill sokay  but now depression is getting worse and that is affecting work  Orders:  -     amphetamine-dextroamphetamine (ADDERALL XR) 30 MG extended release capsule; Take 1 capsule by mouth daily for 90 days. Idiopathic chronic gout without tophus, unspecified site  Comments:  stable  no changes  Orders:  -     allopurinol (ZYLOPRIM) 300 MG tablet; take 1 tablet by mouth once daily    Current moderate episode of major depressive disorder without prior episode ([facility]  Comments:  sarkar snot feel the lexapro is working  wean off over the next 2wk  start cymbalta 60mg qd x 1 week then bid  Orders:  -     DULoxetine (CYMBALTA) 60 MG extended release capsule; take 1 capsule by mouth twice a day    Mixed hyperlipidemia  Comments:  she is not watching her diet  taking crestor but lipid went up  counseled about diet  Orders:  -     rosuvastatin (CRESTOR) 20 MG tablet; Take 1 tablet by mouth daily    Need for immunization against influenza  -     Influenza, FLUCELVAX, (age 10 mo+), IM, Preservative Free, 0.5 mL      I independently reviewed and updated the chief complaint, HPI, past medical and surgical history, medications, allergies and ROS as entered by the LPN. Seen by:   Ok Jaquez DO

## 2022-11-21 DIAGNOSIS — F90.0 ATTENTION DEFICIT HYPERACTIVITY DISORDER (ADHD), PREDOMINANTLY INATTENTIVE TYPE: ICD-10-CM

## 2022-11-21 RX ORDER — DEXTROAMPHETAMINE SACCHARATE, AMPHETAMINE ASPARTATE MONOHYDRATE, DEXTROAMPHETAMINE SULFATE AND AMPHETAMINE SULFATE 7.5; 7.5; 7.5; 7.5 MG/1; MG/1; MG/1; MG/1
30 CAPSULE, EXTENDED RELEASE ORAL DAILY
Qty: 30 CAPSULE | Refills: 0 | Status: SHIPPED | OUTPATIENT
Start: 2022-11-21 | End: 2022-12-21

## 2022-11-21 RX ORDER — DEXTROAMPHETAMINE SACCHARATE, AMPHETAMINE ASPARTATE MONOHYDRATE, DEXTROAMPHETAMINE SULFATE AND AMPHETAMINE SULFATE 7.5; 7.5; 7.5; 7.5 MG/1; MG/1; MG/1; MG/1
30 CAPSULE, EXTENDED RELEASE ORAL DAILY
Qty: 30 CAPSULE | Refills: 0 | Status: SHIPPED | OUTPATIENT
Start: 2022-12-19 | End: 2023-01-18

## 2023-01-17 ENCOUNTER — TELEMEDICINE (OUTPATIENT)
Dept: FAMILY MEDICINE CLINIC | Age: 58
End: 2023-01-17
Payer: COMMERCIAL

## 2023-01-17 DIAGNOSIS — E78.2 MIXED HYPERLIPIDEMIA: ICD-10-CM

## 2023-01-17 DIAGNOSIS — F90.0 ATTENTION DEFICIT HYPERACTIVITY DISORDER (ADHD), PREDOMINANTLY INATTENTIVE TYPE: Primary | ICD-10-CM

## 2023-01-17 DIAGNOSIS — R73.01 IMPAIRED FASTING BLOOD SUGAR: ICD-10-CM

## 2023-01-17 DIAGNOSIS — I10 ESSENTIAL HYPERTENSION: ICD-10-CM

## 2023-01-17 PROCEDURE — 99213 OFFICE O/P EST LOW 20 MIN: CPT | Performed by: FAMILY MEDICINE

## 2023-01-17 RX ORDER — DEXTROAMPHETAMINE SACCHARATE, AMPHETAMINE ASPARTATE MONOHYDRATE, DEXTROAMPHETAMINE SULFATE AND AMPHETAMINE SULFATE 7.5; 7.5; 7.5; 7.5 MG/1; MG/1; MG/1; MG/1
30 CAPSULE, EXTENDED RELEASE ORAL DAILY
Qty: 30 CAPSULE | Refills: 0 | Status: SHIPPED | OUTPATIENT
Start: 2023-03-17 | End: 2023-04-16

## 2023-01-17 RX ORDER — DEXTROAMPHETAMINE SACCHARATE, AMPHETAMINE ASPARTATE MONOHYDRATE, DEXTROAMPHETAMINE SULFATE AND AMPHETAMINE SULFATE 7.5; 7.5; 7.5; 7.5 MG/1; MG/1; MG/1; MG/1
30 CAPSULE, EXTENDED RELEASE ORAL DAILY
Qty: 30 CAPSULE | Refills: 0 | Status: SHIPPED | OUTPATIENT
Start: 2023-01-20 | End: 2023-02-19

## 2023-01-17 RX ORDER — DEXTROAMPHETAMINE SACCHARATE, AMPHETAMINE ASPARTATE MONOHYDRATE, DEXTROAMPHETAMINE SULFATE AND AMPHETAMINE SULFATE 7.5; 7.5; 7.5; 7.5 MG/1; MG/1; MG/1; MG/1
30 CAPSULE, EXTENDED RELEASE ORAL DAILY
Qty: 30 CAPSULE | Refills: 0 | Status: SHIPPED | OUTPATIENT
Start: 2023-02-18 | End: 2023-03-20

## 2023-01-17 ASSESSMENT — PATIENT HEALTH QUESTIONNAIRE - PHQ9
2. FEELING DOWN, DEPRESSED OR HOPELESS: 0
10. IF YOU CHECKED OFF ANY PROBLEMS, HOW DIFFICULT HAVE THESE PROBLEMS MADE IT FOR YOU TO DO YOUR WORK, TAKE CARE OF THINGS AT HOME, OR GET ALONG WITH OTHER PEOPLE: 0
1. LITTLE INTEREST OR PLEASURE IN DOING THINGS: 0
8. MOVING OR SPEAKING SO SLOWLY THAT OTHER PEOPLE COULD HAVE NOTICED. OR THE OPPOSITE, BEING SO FIGETY OR RESTLESS THAT YOU HAVE BEEN MOVING AROUND A LOT MORE THAN USUAL: 0
6. FEELING BAD ABOUT YOURSELF - OR THAT YOU ARE A FAILURE OR HAVE LET YOURSELF OR YOUR FAMILY DOWN: 0
4. FEELING TIRED OR HAVING LITTLE ENERGY: 0
SUM OF ALL RESPONSES TO PHQ QUESTIONS 1-9: 0
SUM OF ALL RESPONSES TO PHQ QUESTIONS 1-9: 0
SUM OF ALL RESPONSES TO PHQ9 QUESTIONS 1 & 2: 0
7. TROUBLE CONCENTRATING ON THINGS, SUCH AS READING THE NEWSPAPER OR WATCHING TELEVISION: 0
SUM OF ALL RESPONSES TO PHQ QUESTIONS 1-9: 0
3. TROUBLE FALLING OR STAYING ASLEEP: 0
9. THOUGHTS THAT YOU WOULD BE BETTER OFF DEAD, OR OF HURTING YOURSELF: 0
5. POOR APPETITE OR OVEREATING: 0
SUM OF ALL RESPONSES TO PHQ QUESTIONS 1-9: 0

## 2023-01-17 ASSESSMENT — ENCOUNTER SYMPTOMS
SORE THROAT: 0
CHEST TIGHTNESS: 0
NAUSEA: 0
SINUS PAIN: 0
EYE PAIN: 0
ABDOMINAL PAIN: 0
CONSTIPATION: 0
DIARRHEA: 0
BACK PAIN: 0
TROUBLE SWALLOWING: 0
COUGH: 0
VOMITING: 0
SHORTNESS OF BREATH: 0
WHEEZING: 0

## 2023-01-17 NOTE — PROGRESS NOTES
Gissel Plascencia (:  1965) is a Established patient, here for evaluation of the following:    Assessment & Plan   Below is the assessment and plan developed based on review of pertinent history, physical exam, labs, studies, and medications. 1. Attention deficit hyperactivity disorder (ADHD), predominantly inattentive type  Comments:  back on the adderall and doing much better  able to work, oarrs reviwed and refill sokay  but now depression is getting worse and that is affecting work  Orders:  -     amphetamine-dextroamphetamine (ADDERALL XR) 30 MG extended release capsule; Take 1 capsule by mouth daily for 30 days. , Disp-30 capsule, R-0Normal  -     amphetamine-dextroamphetamine (ADDERALL XR) 30 MG extended release capsule; Take 1 capsule by mouth daily for 30 days. , Disp-30 capsule, R-0Normal  -     amphetamine-dextroamphetamine (ADDERALL XR) 30 MG extended release capsule; Take 1 capsule by mouth daily for 30 days. Max Daily Amount: 30 mg, Disp-30 capsule, R-0Normal  2. Essential hypertension  Comments:  blood pressures are good'  monitoring it at home'  eating better  Orders:  -     CBC with Auto Differential; Future  -     Comprehensive Metabolic Panel; Future  -     Microalbumin / Creatinine Urine Ratio; Future  3. Mixed hyperlipidemia  -     Lipid Panel; Future  4. Impaired fasting blood sugar  -     Hemoglobin A1C; Future  Return in about 3 months (around 2023) for labs prior. Subjective   Here for a VV for refills on adderall  She has been doing well  The 30mg dose really seems to work for her  No issues sleeping  Work is going well'  Not falling asleep    Blood rpoessures have been good  She is trying to watch diet better'  Less salt  Her job is sedentary though so not moving around enough        Review of Systems   Constitutional:  Negative for appetite change, fatigue and fever. HENT:  Negative for congestion, ear pain, sinus pain, sore throat and trouble swallowing.     Eyes: Negative for pain. Respiratory:  Negative for cough, chest tightness, shortness of breath and wheezing. Cardiovascular:  Negative for chest pain, palpitations and leg swelling. Gastrointestinal:  Negative for abdominal pain, constipation, diarrhea, nausea and vomiting. Endocrine: Negative for cold intolerance and heat intolerance. Genitourinary:  Negative for difficulty urinating, hematuria and pelvic pain. Musculoskeletal:  Negative for back pain, gait problem and joint swelling. Skin:  Negative for rash and wound. Neurological:  Negative for dizziness, syncope and headaches. Hematological:  Negative for adenopathy. Psychiatric/Behavioral:  Negative for confusion, sleep disturbance and suicidal ideas. Objective   Patient-Reported Vitals  No data recorded     Physical Exam  Vitals and nursing note reviewed. Constitutional:       General: She is not in acute distress. Appearance: She is not toxic-appearing. HENT:      Head: Normocephalic and atraumatic. Pulmonary:      Effort: Pulmonary effort is normal. No respiratory distress. Neurological:      Mental Status: She is alert and oriented to person, place, and time. Psychiatric:         Mood and Affect: Mood normal.         Thought Content: Thought content normal.                Chandrika Lissy, was evaluated through a synchronous (real-time) audio-video encounter. The patient (or guardian if applicable) is aware that this is a billable service, which includes applicable co-pays. This Virtual Visit was conducted with patient's (and/or legal guardian's) consent. The visit was conducted pursuant to the emergency declaration under the [address] [place] [address], [address] [place] [address] authority and the [employer] General Act. Patient identification was verified, and a caregiver was present when appropriate.    The patient was located at Home: [address] [place] [address]  [facility] [id number].   Provider was located at Facility (Appt Dept): [address] [place] [address].        --Marli Patel DO

## 2023-01-27 ENCOUNTER — TELEPHONE (OUTPATIENT)
Dept: FAMILY MEDICINE CLINIC | Age: 58
End: 2023-01-27

## 2023-04-19 DIAGNOSIS — I10 ESSENTIAL HYPERTENSION: ICD-10-CM

## 2023-04-19 DIAGNOSIS — E78.2 MIXED HYPERLIPIDEMIA: ICD-10-CM

## 2023-04-19 DIAGNOSIS — R73.01 IMPAIRED FASTING BLOOD SUGAR: ICD-10-CM

## 2023-04-19 LAB
ALBUMIN SERPL-MCNC: 3.6 G/DL (ref 3.5–5.2)
ALP SERPL-CCNC: 125 U/L (ref 35–104)
ALT SERPL-CCNC: 11 U/L (ref 0–32)
ANION GAP SERPL CALCULATED.3IONS-SCNC: 14 MMOL/L (ref 7–16)
AST SERPL-CCNC: 16 U/L (ref 0–31)
BASOPHILS # BLD: 0.05 E9/L (ref 0–0.2)
BASOPHILS NFR BLD: 0.6 % (ref 0–2)
BILIRUB SERPL-MCNC: 0.4 MG/DL (ref 0–1.2)
BUN SERPL-MCNC: 22 MG/DL (ref 6–20)
CALCIUM SERPL-MCNC: 10.1 MG/DL (ref 8.6–10.2)
CHLORIDE SERPL-SCNC: 99 MMOL/L (ref 98–107)
CHOLESTEROL, TOTAL: 159 MG/DL (ref 0–199)
CO2 SERPL-SCNC: 30 MMOL/L (ref 22–29)
CREAT SERPL-MCNC: 1.2 MG/DL (ref 0.5–1)
EOSINOPHIL # BLD: 0.18 E9/L (ref 0.05–0.5)
EOSINOPHIL NFR BLD: 2 % (ref 0–6)
ERYTHROCYTE [DISTWIDTH] IN BLOOD BY AUTOMATED COUNT: 14.9 FL (ref 11.5–15)
GLUCOSE SERPL-MCNC: 104 MG/DL (ref 74–99)
HBA1C MFR BLD: 5.9 % (ref 4–5.6)
HCT VFR BLD AUTO: 53.9 % (ref 34–48)
HDLC SERPL-MCNC: 51 MG/DL
HGB BLD-MCNC: 17.4 G/DL (ref 11.5–15.5)
IMM GRANULOCYTES # BLD: 0.04 E9/L
IMM GRANULOCYTES NFR BLD: 0.5 % (ref 0–5)
LDLC SERPL CALC-MCNC: 76 MG/DL (ref 0–99)
LYMPHOCYTES # BLD: 1.58 E9/L (ref 1.5–4)
LYMPHOCYTES NFR BLD: 17.8 % (ref 20–42)
MCH RBC QN AUTO: 33.3 PG (ref 26–35)
MCHC RBC AUTO-ENTMCNC: 32.3 % (ref 32–34.5)
MCV RBC AUTO: 103.3 FL (ref 80–99.9)
MONOCYTES # BLD: 0.43 E9/L (ref 0.1–0.95)
MONOCYTES NFR BLD: 4.8 % (ref 2–12)
NEUTROPHILS # BLD: 6.6 E9/L (ref 1.8–7.3)
NEUTS SEG NFR BLD: 74.3 % (ref 43–80)
PLATELET # BLD AUTO: 219 E9/L (ref 130–450)
PMV BLD AUTO: 10.7 FL (ref 7–12)
POTASSIUM SERPL-SCNC: 4.2 MMOL/L (ref 3.5–5)
PROT SERPL-MCNC: 6.9 G/DL (ref 6.4–8.3)
RBC # BLD AUTO: 5.22 E12/L (ref 3.5–5.5)
SODIUM SERPL-SCNC: 143 MMOL/L (ref 132–146)
TRIGL SERPL-MCNC: 158 MG/DL (ref 0–149)
VLDLC SERPL CALC-MCNC: 32 MG/DL
WBC # BLD: 8.9 E9/L (ref 4.5–11.5)

## 2023-04-24 LAB
CREAT UR-MCNC: 210 MG/DL (ref 29–226)
MICROALBUMIN UR-MCNC: 321.8 MG/L
MICROALBUMIN/CREAT UR-RTO: 153.2 (ref 0–30)

## 2023-04-25 ENCOUNTER — OFFICE VISIT (OUTPATIENT)
Dept: FAMILY MEDICINE CLINIC | Age: 58
End: 2023-04-25
Payer: COMMERCIAL

## 2023-04-25 VITALS
HEIGHT: 61 IN | DIASTOLIC BLOOD PRESSURE: 74 MMHG | SYSTOLIC BLOOD PRESSURE: 138 MMHG | WEIGHT: 240.6 LBS | OXYGEN SATURATION: 90 % | HEART RATE: 92 BPM | BODY MASS INDEX: 45.42 KG/M2 | TEMPERATURE: 97.7 F

## 2023-04-25 DIAGNOSIS — G25.81 RESTLESS LEG SYNDROME: ICD-10-CM

## 2023-04-25 DIAGNOSIS — R06.02 SHORTNESS OF BREATH: ICD-10-CM

## 2023-04-25 DIAGNOSIS — M1A.00X0 IDIOPATHIC CHRONIC GOUT WITHOUT TOPHUS, UNSPECIFIED SITE: ICD-10-CM

## 2023-04-25 DIAGNOSIS — G43.009 MIGRAINE WITHOUT AURA AND WITHOUT STATUS MIGRAINOSUS, NOT INTRACTABLE: ICD-10-CM

## 2023-04-25 DIAGNOSIS — F32.1 CURRENT MODERATE EPISODE OF MAJOR DEPRESSIVE DISORDER WITHOUT PRIOR EPISODE (HCC): ICD-10-CM

## 2023-04-25 DIAGNOSIS — E78.2 MIXED HYPERLIPIDEMIA: ICD-10-CM

## 2023-04-25 DIAGNOSIS — J44.9 CHRONIC OBSTRUCTIVE PULMONARY DISEASE, UNSPECIFIED COPD TYPE (HCC): ICD-10-CM

## 2023-04-25 DIAGNOSIS — R73.03 PREDIABETES: ICD-10-CM

## 2023-04-25 DIAGNOSIS — I10 ESSENTIAL HYPERTENSION: Primary | ICD-10-CM

## 2023-04-25 DIAGNOSIS — F90.0 ATTENTION DEFICIT HYPERACTIVITY DISORDER (ADHD), PREDOMINANTLY INATTENTIVE TYPE: ICD-10-CM

## 2023-04-25 DIAGNOSIS — G62.89 OTHER POLYNEUROPATHY: ICD-10-CM

## 2023-04-25 PROCEDURE — 3075F SYST BP GE 130 - 139MM HG: CPT | Performed by: FAMILY MEDICINE

## 2023-04-25 PROCEDURE — 3078F DIAST BP <80 MM HG: CPT | Performed by: FAMILY MEDICINE

## 2023-04-25 PROCEDURE — 99215 OFFICE O/P EST HI 40 MIN: CPT | Performed by: FAMILY MEDICINE

## 2023-04-25 RX ORDER — BUDESONIDE AND FORMOTEROL FUMARATE DIHYDRATE 160; 4.5 UG/1; UG/1
2 AEROSOL RESPIRATORY (INHALATION) 2 TIMES DAILY
Qty: 1 EACH | Refills: 5 | Status: SHIPPED | OUTPATIENT
Start: 2023-04-25

## 2023-04-25 RX ORDER — DEXTROAMPHETAMINE SACCHARATE, AMPHETAMINE ASPARTATE MONOHYDRATE, DEXTROAMPHETAMINE SULFATE AND AMPHETAMINE SULFATE 7.5; 7.5; 7.5; 7.5 MG/1; MG/1; MG/1; MG/1
30 CAPSULE, EXTENDED RELEASE ORAL DAILY
Qty: 30 CAPSULE | Refills: 0 | Status: SHIPPED | OUTPATIENT
Start: 2023-06-21 | End: 2023-07-21

## 2023-04-25 RX ORDER — ALLOPURINOL 300 MG/1
TABLET ORAL
Qty: 30 TABLET | Refills: 5 | Status: SHIPPED | OUTPATIENT
Start: 2023-04-25

## 2023-04-25 RX ORDER — LOSARTAN POTASSIUM AND HYDROCHLOROTHIAZIDE 25; 100 MG/1; MG/1
TABLET ORAL
Qty: 30 TABLET | Refills: 5 | Status: SHIPPED | OUTPATIENT
Start: 2023-04-25

## 2023-04-25 RX ORDER — GABAPENTIN 100 MG/1
CAPSULE ORAL
Qty: 120 CAPSULE | Refills: 2 | Status: SHIPPED | OUTPATIENT
Start: 2023-04-25 | End: 2023-06-23

## 2023-04-25 RX ORDER — DULOXETIN HYDROCHLORIDE 60 MG/1
CAPSULE, DELAYED RELEASE ORAL
Qty: 60 CAPSULE | Refills: 5 | Status: SHIPPED | OUTPATIENT
Start: 2023-04-25

## 2023-04-25 RX ORDER — DEXTROAMPHETAMINE SACCHARATE, AMPHETAMINE ASPARTATE MONOHYDRATE, DEXTROAMPHETAMINE SULFATE AND AMPHETAMINE SULFATE 7.5; 7.5; 7.5; 7.5 MG/1; MG/1; MG/1; MG/1
30 CAPSULE, EXTENDED RELEASE ORAL DAILY
Qty: 30 CAPSULE | Refills: 0 | Status: SHIPPED | OUTPATIENT
Start: 2023-05-23 | End: 2023-06-22

## 2023-04-25 RX ORDER — POTASSIUM CHLORIDE 750 MG/1
10 TABLET, EXTENDED RELEASE ORAL DAILY
Qty: 90 TABLET | Refills: 1 | Status: SHIPPED | OUTPATIENT
Start: 2023-04-25

## 2023-04-25 RX ORDER — ROSUVASTATIN CALCIUM 20 MG/1
20 TABLET, COATED ORAL DAILY
Qty: 30 TABLET | Refills: 5 | Status: SHIPPED | OUTPATIENT
Start: 2023-04-25

## 2023-04-25 RX ORDER — DEXTROAMPHETAMINE SACCHARATE, AMPHETAMINE ASPARTATE MONOHYDRATE, DEXTROAMPHETAMINE SULFATE AND AMPHETAMINE SULFATE 7.5; 7.5; 7.5; 7.5 MG/1; MG/1; MG/1; MG/1
30 CAPSULE, EXTENDED RELEASE ORAL DAILY
Qty: 30 CAPSULE | Refills: 0 | Status: SHIPPED | OUTPATIENT
Start: 2023-04-25 | End: 2023-05-25

## 2023-04-25 RX ORDER — VARENICLINE TARTRATE 1 MG/1
1 TABLET, FILM COATED ORAL 2 TIMES DAILY
Qty: 60 TABLET | Refills: 5 | Status: SHIPPED | OUTPATIENT
Start: 2023-04-25

## 2023-04-25 ASSESSMENT — PATIENT HEALTH QUESTIONNAIRE - PHQ9
SUM OF ALL RESPONSES TO PHQ QUESTIONS 1-9: 14
4. FEELING TIRED OR HAVING LITTLE ENERGY: 3
SUM OF ALL RESPONSES TO PHQ9 QUESTIONS 1 & 2: 6
8. MOVING OR SPEAKING SO SLOWLY THAT OTHER PEOPLE COULD HAVE NOTICED. OR THE OPPOSITE, BEING SO FIGETY OR RESTLESS THAT YOU HAVE BEEN MOVING AROUND A LOT MORE THAN USUAL: 0
SUM OF ALL RESPONSES TO PHQ QUESTIONS 1-9: 14
1. LITTLE INTEREST OR PLEASURE IN DOING THINGS: 3
7. TROUBLE CONCENTRATING ON THINGS, SUCH AS READING THE NEWSPAPER OR WATCHING TELEVISION: 1
9. THOUGHTS THAT YOU WOULD BE BETTER OFF DEAD, OR OF HURTING YOURSELF: 0
6. FEELING BAD ABOUT YOURSELF - OR THAT YOU ARE A FAILURE OR HAVE LET YOURSELF OR YOUR FAMILY DOWN: 1
5. POOR APPETITE OR OVEREATING: 0
2. FEELING DOWN, DEPRESSED OR HOPELESS: 3
3. TROUBLE FALLING OR STAYING ASLEEP: 3
10. IF YOU CHECKED OFF ANY PROBLEMS, HOW DIFFICULT HAVE THESE PROBLEMS MADE IT FOR YOU TO DO YOUR WORK, TAKE CARE OF THINGS AT HOME, OR GET ALONG WITH OTHER PEOPLE: 2

## 2023-04-25 ASSESSMENT — ENCOUNTER SYMPTOMS
SORE THROAT: 0
ABDOMINAL PAIN: 0
EYE PAIN: 0
CHEST TIGHTNESS: 0
BACK PAIN: 0
DIARRHEA: 0
NAUSEA: 0
SHORTNESS OF BREATH: 1
TROUBLE SWALLOWING: 0
VOMITING: 0
COUGH: 0
WHEEZING: 1
CONSTIPATION: 0
SINUS PAIN: 0

## 2023-04-25 NOTE — PROGRESS NOTES
4/25/23    Name: Quinn Lozada  CGI:60/27/3163   Sex:female   Age:57 y.o. Chief Complaint   Patient presents with    Hypertension    ADHD    COPD    Depression     Patient presents to office for visit. She did have labs done. Patient did have issues filling her adderall last month because of medication shortage. She says she is having a harder time breathing. Patient is getting winded just standing and doing dishes. She is using her symbicort, but it doesn't seem to help too much anymore. Patient says she is very depressed. She has felt depressed for about a month now. She is getting older, she is alone, she can't breathe well. Patient says she just doesn't feel like doing anything. Here for a check up  Her depression is getting worse  She is not going anywhere or doing anything, more trouble breathing, shortness of breath daily, oxygen levels still borderline'  She  cannot do really anything without getting short of breath and has become very sedentary  No increase in cough  She is taking cymbalta twice a day  Not doing anything and eating more so weight has goneup'    COPD  Still smoking  She has ageed to try the chantix to see if this will help her quit  Also get PFT and refer to pulmonary  Her COPD may very well be getting much worse, more symptoms now and they are affecting her ADL's and daily life, so much that she sits at home now and is more sedentary  Continue inhaler at this time    ADHD  On adderall  Will continue this  for now  Refills sents  Oarrs reviwed    HTN  Reading up today  It has been lower in the past  Still taking all of her meds'no chanes there  She will watch readings better    Prediabetes  Counseled about diet  Needs to really be more careful with diet  Eating more processed foods and junk        Review of Systems   Constitutional:  Positive for fatigue. Negative for appetite change and fever. HENT:  Negative for congestion, ear pain, sinus pain, sore throat and trouble swallowing.

## 2023-06-02 ENCOUNTER — HOSPITAL ENCOUNTER (OUTPATIENT)
Dept: PULMONOLOGY | Age: 58
Discharge: HOME | End: 2023-06-02
Payer: COMMERCIAL

## 2023-06-02 DIAGNOSIS — R06.02 SHORTNESS OF BREATH: ICD-10-CM

## 2023-06-02 DIAGNOSIS — J44.9 CHRONIC OBSTRUCTIVE PULMONARY DISEASE, UNSPECIFIED COPD TYPE (HCC): ICD-10-CM

## 2023-06-02 PROCEDURE — 94726 PLETHYSMOGRAPHY LUNG VOLUMES: CPT

## 2023-06-02 PROCEDURE — 94060 EVALUATION OF WHEEZING: CPT

## 2023-06-02 PROCEDURE — 94729 DIFFUSING CAPACITY: CPT

## 2023-07-20 ENCOUNTER — TELEMEDICINE (OUTPATIENT)
Dept: FAMILY MEDICINE CLINIC | Age: 58
End: 2023-07-20
Payer: COMMERCIAL

## 2023-07-20 DIAGNOSIS — F90.0 ATTENTION DEFICIT HYPERACTIVITY DISORDER (ADHD), PREDOMINANTLY INATTENTIVE TYPE: ICD-10-CM

## 2023-07-20 DIAGNOSIS — J44.9 CHRONIC OBSTRUCTIVE PULMONARY DISEASE, UNSPECIFIED COPD TYPE (HCC): Primary | ICD-10-CM

## 2023-07-20 DIAGNOSIS — Z72.0 TOBACCO USE: ICD-10-CM

## 2023-07-20 DIAGNOSIS — F32.1 CURRENT MODERATE EPISODE OF MAJOR DEPRESSIVE DISORDER WITHOUT PRIOR EPISODE (HCC): ICD-10-CM

## 2023-07-20 DIAGNOSIS — G43.009 MIGRAINE WITHOUT AURA AND WITHOUT STATUS MIGRAINOSUS, NOT INTRACTABLE: ICD-10-CM

## 2023-07-20 PROCEDURE — 99214 OFFICE O/P EST MOD 30 MIN: CPT | Performed by: FAMILY MEDICINE

## 2023-07-20 RX ORDER — DEXTROAMPHETAMINE SACCHARATE, AMPHETAMINE ASPARTATE MONOHYDRATE, DEXTROAMPHETAMINE SULFATE AND AMPHETAMINE SULFATE 7.5; 7.5; 7.5; 7.5 MG/1; MG/1; MG/1; MG/1
30 CAPSULE, EXTENDED RELEASE ORAL DAILY
Qty: 30 CAPSULE | Refills: 0 | Status: SHIPPED | OUTPATIENT
Start: 2023-10-06 | End: 2023-11-05

## 2023-07-20 RX ORDER — DEXTROAMPHETAMINE SACCHARATE, AMPHETAMINE ASPARTATE MONOHYDRATE, DEXTROAMPHETAMINE SULFATE AND AMPHETAMINE SULFATE 7.5; 7.5; 7.5; 7.5 MG/1; MG/1; MG/1; MG/1
30 CAPSULE, EXTENDED RELEASE ORAL DAILY
Qty: 30 CAPSULE | Refills: 0 | Status: SHIPPED | OUTPATIENT
Start: 2023-09-09 | End: 2023-10-09

## 2023-07-20 RX ORDER — DEXTROAMPHETAMINE SACCHARATE, AMPHETAMINE ASPARTATE MONOHYDRATE, DEXTROAMPHETAMINE SULFATE AND AMPHETAMINE SULFATE 7.5; 7.5; 7.5; 7.5 MG/1; MG/1; MG/1; MG/1
30 CAPSULE, EXTENDED RELEASE ORAL DAILY
Qty: 30 CAPSULE | Refills: 0 | Status: SHIPPED | OUTPATIENT
Start: 2023-08-11 | End: 2023-09-10

## 2023-07-20 RX ORDER — BUPROPION HYDROCHLORIDE 150 MG/1
150 TABLET ORAL EVERY MORNING
Qty: 90 TABLET | Refills: 1 | Status: SHIPPED | OUTPATIENT
Start: 2023-07-20

## 2023-07-20 ASSESSMENT — ENCOUNTER SYMPTOMS
SINUS PAIN: 0
NAUSEA: 0
WHEEZING: 0
COUGH: 1
SORE THROAT: 0
EYE PAIN: 0
VOMITING: 0
DIARRHEA: 0
CONSTIPATION: 0
SHORTNESS OF BREATH: 1
CHEST TIGHTNESS: 0
TROUBLE SWALLOWING: 0
BACK PAIN: 0
ABDOMINAL PAIN: 0

## 2023-07-20 NOTE — PROGRESS NOTES
Nia Balderas (:  1965) is a Established patient, presenting virtually for evaluation of the following: REFILLS ON MEDICATIONS    Assessment & Plan   Below is the assessment and plan developed based on review of pertinent history, physical exam, labs, studies, and medications. 1. Chronic obstructive pulmonary disease, unspecified COPD type ([address])  Comments:  pft done-show obstruction-needs to see pulmonary  feel tired all the time  symbicort not helping'  change to trelegy  2. Attention deficit hyperactivity disorder (ADHD), predominantly inattentive type  Comments:  back on the adderall and doing much better  able to work, oarrs reviwed and refill sokay  but now depression is getting worse and that is affecting work  Orders:  -     amphetamine-dextroamphetamine (ADDERALL XR) 30 MG extended release capsule; Take 1 capsule by mouth daily for 30 days. , Disp-30 capsule, R-0Normal  -     amphetamine-dextroamphetamine (ADDERALL XR) 30 MG extended release capsule; Take 1 capsule by mouth daily for 30 days. , Disp-30 capsule, R-0Normal  -     amphetamine-dextroamphetamine (ADDERALL XR) 30 MG extended release capsule; Take 1 capsule by mouth daily for 30 days. Max Daily Amount: 30 mg, Disp-30 capsule, R-0Normal  3. Current moderate episode of major depressive disorder without prior episode ([address])  Comments:  has gotten a little worse due to breathing & feeling tired  add wellbutrinin the mornings  recheck in a few weeks  4. Migraine without aura and without status migrainosus, not intractable  Comments:  headaches have been okay  just struggling a lot with her depression  5. Tobacco use  Comments:  she hada cut back with the chantix but then stopped it b/c she was not sure if it was working'  she will restart it, she does want to quit tob use    Return in about 3 months (around 10/20/2023) for sooner if wellbutrin not working.        Subjective   Here for VV  Follow up on adhd and peripheral neuropathy  Oarrs

## 2023-08-18 DIAGNOSIS — F90.0 ATTENTION DEFICIT HYPERACTIVITY DISORDER (ADHD), PREDOMINANTLY INATTENTIVE TYPE: ICD-10-CM

## 2023-08-18 RX ORDER — DEXTROAMPHETAMINE SACCHARATE, AMPHETAMINE ASPARTATE MONOHYDRATE, DEXTROAMPHETAMINE SULFATE AND AMPHETAMINE SULFATE 7.5; 7.5; 7.5; 7.5 MG/1; MG/1; MG/1; MG/1
30 CAPSULE, EXTENDED RELEASE ORAL DAILY
Qty: 30 CAPSULE | Refills: 0 | Status: SHIPPED | OUTPATIENT
Start: 2023-09-15 | End: 2023-10-15

## 2023-08-18 RX ORDER — DEXTROAMPHETAMINE SACCHARATE, AMPHETAMINE ASPARTATE MONOHYDRATE, DEXTROAMPHETAMINE SULFATE AND AMPHETAMINE SULFATE 7.5; 7.5; 7.5; 7.5 MG/1; MG/1; MG/1; MG/1
30 CAPSULE, EXTENDED RELEASE ORAL DAILY
Qty: 30 CAPSULE | Refills: 0 | Status: SHIPPED | OUTPATIENT
Start: 2023-08-18 | End: 2023-09-17

## 2023-08-18 RX ORDER — DEXTROAMPHETAMINE SACCHARATE, AMPHETAMINE ASPARTATE MONOHYDRATE, DEXTROAMPHETAMINE SULFATE AND AMPHETAMINE SULFATE 7.5; 7.5; 7.5; 7.5 MG/1; MG/1; MG/1; MG/1
30 CAPSULE, EXTENDED RELEASE ORAL DAILY
Qty: 30 CAPSULE | Refills: 0 | Status: SHIPPED | OUTPATIENT
Start: 2023-10-13 | End: 2023-11-12

## 2023-09-18 ENCOUNTER — HOSPITAL ENCOUNTER (INPATIENT)
Age: 58
LOS: 9 days | Discharge: HOME | DRG: 189 | End: 2023-09-28
Attending: EMERGENCY MEDICINE | Admitting: INTERNAL MEDICINE
Payer: COMMERCIAL

## 2023-09-18 ENCOUNTER — APPOINTMENT (OUTPATIENT)
Dept: GENERAL RADIOLOGY | Age: 58
DRG: 189 | End: 2023-09-18
Payer: COMMERCIAL

## 2023-09-18 DIAGNOSIS — J96.02 ACUTE RESPIRATORY FAILURE WITH HYPOXIA AND HYPERCAPNIA (HCC): Primary | ICD-10-CM

## 2023-09-18 DIAGNOSIS — J96.01 ACUTE RESPIRATORY FAILURE WITH HYPOXIA AND HYPERCAPNIA (HCC): Primary | ICD-10-CM

## 2023-09-18 DIAGNOSIS — E11.65 POORLY CONTROLLED TYPE 2 DIABETES MELLITUS (HCC): ICD-10-CM

## 2023-09-18 DIAGNOSIS — J44.1 COPD EXACERBATION (HCC): ICD-10-CM

## 2023-09-18 DIAGNOSIS — R79.89 D-DIMER, ELEVATED: ICD-10-CM

## 2023-09-18 DIAGNOSIS — Z91.89 MULTIPLE RISK FACTORS FOR CORONARY ARTERY DISEASE: ICD-10-CM

## 2023-09-18 LAB
ALBUMIN SERPL-MCNC: 3.8 G/DL (ref 3.5–5.2)
ALP SERPL-CCNC: 169 U/L (ref 35–104)
ALT SERPL-CCNC: 14 U/L (ref 0–32)
ANION GAP SERPL CALCULATED.3IONS-SCNC: 14 MMOL/L (ref 7–16)
AST SERPL-CCNC: 21 U/L (ref 0–31)
BILIRUB SERPL-MCNC: 0.5 MG/DL (ref 0–1.2)
BNP SERPL-MCNC: 993 PG/ML (ref 0–125)
BUN SERPL-MCNC: 14 MG/DL (ref 6–20)
CALCIUM SERPL-MCNC: 9.8 MG/DL (ref 8.6–10.2)
CHLORIDE SERPL-SCNC: 96 MMOL/L (ref 98–107)
CO2 SERPL-SCNC: 29 MMOL/L (ref 22–29)
CREAT SERPL-MCNC: 0.8 MG/DL (ref 0.5–1)
D DIMER: 428 NG/ML DDU (ref 0–232)
FIO2: 100
FLUAV RNA RESP QL NAA+PROBE: NOT DETECTED
FLUBV RNA RESP QL NAA+PROBE: NOT DETECTED
GFR SERPL CREATININE-BSD FRML MDRD: >60 ML/MIN/1.73M2
GLUCOSE SERPL-MCNC: 233 MG/DL (ref 74–99)
LACTATE BLDV-SCNC: 3 MMOL/L (ref 0.5–2.2)
POC HCO3: 33.7 MMOL/L (ref 22–26)
POC O2 SATURATION: 98.5 % (ref 92–98.5)
POC PCO2: 70.2 MM HG (ref 35–45)
POC PH: 7.29 (ref 7.35–7.45)
POC PO2: 134.1 MM HG (ref 80–100)
POSITIVE BASE EXCESS, ART: 3.1 MMOL/L (ref 0–3)
POTASSIUM SERPL-SCNC: 3.8 MMOL/L (ref 3.5–5)
PROT SERPL-MCNC: 8.2 G/DL (ref 6.4–8.3)
SAMPLE SITE: ABNORMAL
SARS-COV-2 RNA RESP QL NAA+PROBE: NOT DETECTED
SODIUM SERPL-SCNC: 139 MMOL/L (ref 132–146)
SOURCE: NORMAL
SPECIMEN DESCRIPTION: NORMAL
TROPONIN I SERPL HS-MCNC: 9 NG/L (ref 0–9)

## 2023-09-18 PROCEDURE — 80053 COMPREHEN METABOLIC PANEL: CPT

## 2023-09-18 PROCEDURE — 87636 SARSCOV2 & INF A&B AMP PRB: CPT

## 2023-09-18 PROCEDURE — 93005 ELECTROCARDIOGRAM TRACING: CPT | Performed by: EMERGENCY MEDICINE

## 2023-09-18 PROCEDURE — 96372 THER/PROPH/DIAG INJ SC/IM: CPT

## 2023-09-18 PROCEDURE — 85379 FIBRIN DEGRADATION QUANT: CPT

## 2023-09-18 PROCEDURE — 82803 BLOOD GASES ANY COMBINATION: CPT

## 2023-09-18 PROCEDURE — 71045 X-RAY EXAM CHEST 1 VIEW: CPT

## 2023-09-18 PROCEDURE — 85025 COMPLETE CBC W/AUTO DIFF WBC: CPT

## 2023-09-18 PROCEDURE — 2000000000 HC ICU R&B

## 2023-09-18 PROCEDURE — 96375 TX/PRO/DX INJ NEW DRUG ADDON: CPT

## 2023-09-18 PROCEDURE — 6360000002 HC RX W HCPCS: Performed by: EMERGENCY MEDICINE

## 2023-09-18 PROCEDURE — 2580000003 HC RX 258: Performed by: EMERGENCY MEDICINE

## 2023-09-18 PROCEDURE — 6370000000 HC RX 637 (ALT 250 FOR IP)

## 2023-09-18 PROCEDURE — 83605 ASSAY OF LACTIC ACID: CPT

## 2023-09-18 PROCEDURE — 84484 ASSAY OF TROPONIN QUANT: CPT

## 2023-09-18 PROCEDURE — 96374 THER/PROPH/DIAG INJ IV PUSH: CPT

## 2023-09-18 PROCEDURE — 99285 EMERGENCY DEPT VISIT HI MDM: CPT

## 2023-09-18 PROCEDURE — 83880 ASSAY OF NATRIURETIC PEPTIDE: CPT

## 2023-09-18 RX ORDER — 0.9 % SODIUM CHLORIDE 0.9 %
1000 INTRAVENOUS SOLUTION INTRAVENOUS ONCE
Status: COMPLETED | OUTPATIENT
Start: 2023-09-19 | End: 2023-09-19

## 2023-09-18 RX ORDER — IPRATROPIUM BROMIDE AND ALBUTEROL SULFATE 2.5; .5 MG/3ML; MG/3ML
SOLUTION RESPIRATORY (INHALATION)
Status: COMPLETED
Start: 2023-09-18 | End: 2023-09-18

## 2023-09-18 RX ORDER — IPRATROPIUM BROMIDE AND ALBUTEROL SULFATE 2.5; .5 MG/3ML; MG/3ML
1 SOLUTION RESPIRATORY (INHALATION)
Status: ACTIVE | OUTPATIENT
Start: 2023-09-18 | End: 2023-09-18

## 2023-09-18 RX ORDER — ENOXAPARIN SODIUM 150 MG/ML
1 INJECTION SUBCUTANEOUS ONCE
Status: COMPLETED | OUTPATIENT
Start: 2023-09-19 | End: 2023-09-18

## 2023-09-18 RX ORDER — LORAZEPAM 2 MG/ML
0.25 INJECTION INTRAMUSCULAR ONCE
Status: COMPLETED | OUTPATIENT
Start: 2023-09-18 | End: 2023-09-18

## 2023-09-18 RX ORDER — 0.9 % SODIUM CHLORIDE 0.9 %
1000 INTRAVENOUS SOLUTION INTRAVENOUS ONCE
Status: COMPLETED | OUTPATIENT
Start: 2023-09-18 | End: 2023-09-18

## 2023-09-18 RX ADMIN — METHYLPREDNISOLONE SODIUM SUCCINATE 125 MG: 125 INJECTION, POWDER, LYOPHILIZED, FOR SOLUTION INTRAMUSCULAR; INTRAVENOUS at 23:22

## 2023-09-18 RX ADMIN — LORAZEPAM 0.26 MG: 2 INJECTION INTRAMUSCULAR; INTRAVENOUS at 23:57

## 2023-09-18 RX ADMIN — SODIUM CHLORIDE 1000 ML: 9 INJECTION, SOLUTION INTRAVENOUS at 23:21

## 2023-09-18 RX ADMIN — IPRATROPIUM BROMIDE AND ALBUTEROL SULFATE 1 DOSE: .5; 3 SOLUTION RESPIRATORY (INHALATION) at 23:22

## 2023-09-18 RX ADMIN — ENOXAPARIN SODIUM 105 MG: 150 INJECTION SUBCUTANEOUS at 23:57

## 2023-09-18 RX ADMIN — IPRATROPIUM BROMIDE AND ALBUTEROL SULFATE 1 DOSE: 2.5; .5 SOLUTION RESPIRATORY (INHALATION) at 23:22

## 2023-09-19 ENCOUNTER — APPOINTMENT (OUTPATIENT)
Dept: GENERAL RADIOLOGY | Age: 58
DRG: 189 | End: 2023-09-19
Payer: COMMERCIAL

## 2023-09-19 PROBLEM — J96.01 ACUTE RESPIRATORY FAILURE WITH HYPOXIA (HCC): Status: ACTIVE | Noted: 2023-09-19

## 2023-09-19 PROBLEM — J96.00 ACUTE RESPIRATORY FAILURE (HCC): Status: ACTIVE | Noted: 2023-09-19

## 2023-09-19 LAB
AADO2: 108.8 MMHG
AADO2: 137.4 MMHG
AADO2: 143.4 MMHG
ALBUMIN SERPL-MCNC: 3.1 G/DL (ref 3.5–5.2)
ALP SERPL-CCNC: 142 U/L (ref 35–104)
ALT SERPL-CCNC: 16 U/L (ref 0–32)
ANION GAP SERPL CALCULATED.3IONS-SCNC: 10 MMOL/L (ref 7–16)
AST SERPL-CCNC: 15 U/L (ref 0–31)
B PARAP IS1001 DNA NPH QL NAA+NON-PROBE: NOT DETECTED
B PERT DNA SPEC QL NAA+PROBE: NOT DETECTED
B.E.: 0.3 MMOL/L
B.E.: 0.5 MMOL/L (ref -3–3)
B.E.: 0.9 MMOL/L (ref -3–3)
BASOPHILS # BLD: 0.07 K/UL (ref 0–0.2)
BASOPHILS # BLD: 0.07 K/UL (ref 0–0.2)
BASOPHILS NFR BLD: 1 % (ref 0–2)
BASOPHILS NFR BLD: 1 % (ref 0–2)
BILIRUB SERPL-MCNC: 0.2 MG/DL (ref 0–1.2)
BUN SERPL-MCNC: 13 MG/DL (ref 6–20)
C PNEUM DNA NPH QL NAA+NON-PROBE: NOT DETECTED
CALCIUM SERPL-MCNC: 8.6 MG/DL (ref 8.6–10.2)
CHLORIDE SERPL-SCNC: 103 MMOL/L (ref 98–107)
CO2 SERPL-SCNC: 28 MMOL/L (ref 22–29)
COHB: 1.3 % (ref 0–1.5)
COHB: 1.6 % (ref 0–1.5)
COHB: 1.7 % (ref 0–1.5)
CREAT SERPL-MCNC: 0.9 MG/DL (ref 0.5–1)
CRITICAL ACTION: NORMAL
CRITICAL NOTIFICATION DATE/TIME: NORMAL
CRITICAL NOTIFICATION: NORMAL
CRITICAL VALUE READ BACK: YES
CRITICAL: ABNORMAL
DATE ANALYZED: ABNORMAL
DATE OF COLLECTION: ABNORMAL
EKG ATRIAL RATE: 121 BPM
EKG P AXIS: 82 DEGREES
EKG P-R INTERVAL: 144 MS
EKG Q-T INTERVAL: 324 MS
EKG QRS DURATION: 80 MS
EKG QTC CALCULATION (BAZETT): 460 MS
EKG R AXIS: 85 DEGREES
EKG T AXIS: 62 DEGREES
EKG VENTRICULAR RATE: 121 BPM
EOSINOPHIL # BLD: 0 K/UL (ref 0.05–0.5)
EOSINOPHIL # BLD: 0.17 K/UL (ref 0.05–0.5)
EOSINOPHILS RELATIVE PERCENT: 0 % (ref 0–6)
EOSINOPHILS RELATIVE PERCENT: 2 % (ref 0–6)
ERYTHROCYTE [DISTWIDTH] IN BLOOD BY AUTOMATED COUNT: 14.9 % (ref 11.5–15)
ERYTHROCYTE [DISTWIDTH] IN BLOOD BY AUTOMATED COUNT: 15.3 % (ref 11.5–15)
FIO2: 40 %
FIO2: 40 %
FIO2: 50 %
FLUAV RNA NPH QL NAA+NON-PROBE: NOT DETECTED
FLUBV RNA NPH QL NAA+NON-PROBE: NOT DETECTED
GFR SERPL CREATININE-BSD FRML MDRD: >60 ML/MIN/1.73M2
GLUCOSE BLD-MCNC: 127 MG/DL (ref 74–99)
GLUCOSE BLD-MCNC: 133 MG/DL (ref 74–99)
GLUCOSE BLD-MCNC: 134 MG/DL (ref 74–99)
GLUCOSE BLD-MCNC: 138 MG/DL (ref 74–99)
GLUCOSE SERPL-MCNC: 154 MG/DL (ref 74–99)
HADV DNA NPH QL NAA+NON-PROBE: NOT DETECTED
HBA1C MFR BLD: 5.8 % (ref 4–5.6)
HCO3: 28.9 MMOL/L (ref 22–26)
HCO3: 29.4 MMOL/L (ref 22–26)
HCO3: 29.5 MMOL/L
HCOV 229E RNA NPH QL NAA+NON-PROBE: NOT DETECTED
HCOV HKU1 RNA NPH QL NAA+NON-PROBE: NOT DETECTED
HCOV NL63 RNA NPH QL NAA+NON-PROBE: NOT DETECTED
HCOV OC43 RNA NPH QL NAA+NON-PROBE: NOT DETECTED
HCT VFR BLD AUTO: 49.1 % (ref 34–48)
HCT VFR BLD AUTO: 56.2 % (ref 34–48)
HGB BLD-MCNC: 15.7 G/DL (ref 11.5–15.5)
HGB BLD-MCNC: 18.5 G/DL (ref 11.5–15.5)
HHB: 1.6 % (ref 0–5)
HHB: 11.5 %
HHB: 3 % (ref 0–5)
HMPV RNA NPH QL NAA+NON-PROBE: NOT DETECTED
HPIV1 RNA NPH QL NAA+NON-PROBE: NOT DETECTED
HPIV2 RNA NPH QL NAA+NON-PROBE: NOT DETECTED
HPIV3 RNA NPH QL NAA+NON-PROBE: NOT DETECTED
HPIV4 RNA NPH QL NAA+NON-PROBE: NOT DETECTED
IMM GRANULOCYTES # BLD AUTO: 0.03 K/UL (ref 0–0.58)
IMM GRANULOCYTES NFR BLD: 0 % (ref 0–5)
LAB: ABNORMAL
LACTATE BLDV-SCNC: 1.6 MMOL/L (ref 0.5–2.2)
LYMPHOCYTES NFR BLD: 0.34 K/UL (ref 1.5–4)
LYMPHOCYTES NFR BLD: 1.61 K/UL (ref 1.5–4)
LYMPHOCYTES RELATIVE PERCENT: 14 % (ref 20–42)
LYMPHOCYTES RELATIVE PERCENT: 4 % (ref 20–42)
Lab: 1938
Lab: ABNORMAL
Lab: ABNORMAL
M PNEUMO DNA NPH QL NAA+NON-PROBE: NOT DETECTED
MCH RBC QN AUTO: 33 PG (ref 26–35)
MCH RBC QN AUTO: 33.6 PG (ref 26–35)
MCHC RBC AUTO-ENTMCNC: 32 G/DL (ref 32–34.5)
MCHC RBC AUTO-ENTMCNC: 32.9 G/DL (ref 32–34.5)
MCV RBC AUTO: 102 FL (ref 80–99.9)
MCV RBC AUTO: 103.2 FL (ref 80–99.9)
METHB: 0.4 % (ref 0–1.5)
MODE: ABNORMAL
MONOCYTES NFR BLD: 0.07 K/UL (ref 0.1–0.95)
MONOCYTES NFR BLD: 0.62 K/UL (ref 0.1–0.95)
MONOCYTES NFR BLD: 1 % (ref 2–12)
MONOCYTES NFR BLD: 5 % (ref 2–12)
NEUTROPHILS NFR BLD: 78 % (ref 43–80)
NEUTROPHILS NFR BLD: 94 % (ref 43–80)
NEUTS SEG NFR BLD: 7.13 K/UL (ref 1.8–7.3)
NEUTS SEG NFR BLD: 8.88 K/UL (ref 1.8–7.3)
NUCLEATED RED BLOOD CELLS: 1 PER 100 WBC
O2 CONTENT: 20.4 ML/DL
O2 CONTENT: 22.2 ML/DL
O2 CONTENT: 23 ML/DL
O2 SATURATION: 88.3 %
O2 SATURATION: 96.9 % (ref 92–98.5)
O2 SATURATION: 98.4 % (ref 92–98.5)
O2HB: 86.5 %
O2HB: 95.3 % (ref 94–97)
O2HB: 96.3 % (ref 94–97)
OPERATOR ID: 1210
OPERATOR ID: ABNORMAL
OPERATOR ID: ABNORMAL
PATIENT TEMP: 37 C
PCO2: 61.6 MMHG (ref 35–45)
PCO2: 62.2 MMHG (ref 35–45)
PCO2: 67 MMHG (ref 40–52)
PEEP/CPAP: 5 CMH2O
PFO2: 1.52 MMHG/%
PFO2: 2.39 MMHG/%
PFO2: 2.61 MMHG/%
PH BLOOD GAS: 7.26 (ref 7.3–7.42)
PH BLOOD GAS: 7.29 (ref 7.35–7.45)
PH BLOOD GAS: 7.29 (ref 7.35–7.45)
PLATELET # BLD AUTO: 186 K/UL (ref 130–450)
PLATELET # BLD AUTO: 229 K/UL (ref 130–450)
PMV BLD AUTO: 10.2 FL (ref 7–12)
PMV BLD AUTO: 9.9 FL (ref 7–12)
PO2: 130.6 MMHG (ref 75–100)
PO2: 60.8 MMHG (ref 30–50)
PO2: 95.6 MMHG (ref 75–100)
POC HCO3: 31.4 MMOL/L (ref 22–26)
POC O2 SATURATION: 99.9 % (ref 92–98.5)
POC PCO2: 73.7 MM HG (ref 35–45)
POC PH: 7.24 (ref 7.35–7.45)
POC PO2: 350.5 MM HG (ref 80–100)
POSITIVE BASE EXCESS, ART: 0.5 MMOL/L (ref 0–3)
POTASSIUM SERPL-SCNC: 4.7 MMOL/L (ref 3.5–5)
PROCALCITONIN SERPL-MCNC: 0.11 NG/ML (ref 0–0.08)
PROT SERPL-MCNC: 7 G/DL (ref 6.4–8.3)
PS: 12 CMH20
RBC # BLD AUTO: 4.76 M/UL (ref 3.5–5.5)
RBC # BLD AUTO: 5.51 M/UL (ref 3.5–5.5)
RBC # BLD: ABNORMAL 10*6/UL
RBC # BLD: ABNORMAL 10*6/UL
RI(T): 1.1
RI(T): 1.14
RI(T): 2.26
RSV RNA NPH QL NAA+NON-PROBE: NOT DETECTED
RV+EV RNA NPH QL NAA+NON-PROBE: DETECTED
SAMPLE SITE: ABNORMAL
SARS-COV-2 RNA NPH QL NAA+NON-PROBE: NOT DETECTED
SODIUM SERPL-SCNC: 141 MMOL/L (ref 132–146)
SOURCE, BLOOD GAS: ABNORMAL
SPECIMEN DESCRIPTION: ABNORMAL
THB: 16.5 G/DL (ref 11.5–16.5)
THB: 16.8 G/DL (ref 11.5–16.5)
THB: 16.9 G/DL (ref 11.5–16.5)
TIME ANALYZED: 1947
TIME ANALYZED: 418
TIME ANALYZED: 517
WBC OTHER # BLD: 11.4 K/UL (ref 4.5–11.5)
WBC OTHER # BLD: 7.6 K/UL (ref 4.5–11.5)

## 2023-09-19 PROCEDURE — 93010 ELECTROCARDIOGRAM REPORT: CPT | Performed by: INTERNAL MEDICINE

## 2023-09-19 PROCEDURE — 80053 COMPREHEN METABOLIC PANEL: CPT

## 2023-09-19 PROCEDURE — 87205 SMEAR GRAM STAIN: CPT

## 2023-09-19 PROCEDURE — 6360000002 HC RX W HCPCS: Performed by: STUDENT IN AN ORGANIZED HEALTH CARE EDUCATION/TRAINING PROGRAM

## 2023-09-19 PROCEDURE — 83605 ASSAY OF LACTIC ACID: CPT

## 2023-09-19 PROCEDURE — 82803 BLOOD GASES ANY COMBINATION: CPT

## 2023-09-19 PROCEDURE — 6370000000 HC RX 637 (ALT 250 FOR IP): Performed by: STUDENT IN AN ORGANIZED HEALTH CARE EDUCATION/TRAINING PROGRAM

## 2023-09-19 PROCEDURE — 71045 X-RAY EXAM CHEST 1 VIEW: CPT

## 2023-09-19 PROCEDURE — 5A09557 ASSISTANCE WITH RESPIRATORY VENTILATION, GREATER THAN 96 CONSECUTIVE HOURS, CONTINUOUS POSITIVE AIRWAY PRESSURE: ICD-10-PCS | Performed by: STUDENT IN AN ORGANIZED HEALTH CARE EDUCATION/TRAINING PROGRAM

## 2023-09-19 PROCEDURE — 94640 AIRWAY INHALATION TREATMENT: CPT

## 2023-09-19 PROCEDURE — 94660 CPAP INITIATION&MGMT: CPT

## 2023-09-19 PROCEDURE — 87070 CULTURE OTHR SPECIMN AEROBIC: CPT

## 2023-09-19 PROCEDURE — 2580000003 HC RX 258: Performed by: STUDENT IN AN ORGANIZED HEALTH CARE EDUCATION/TRAINING PROGRAM

## 2023-09-19 PROCEDURE — 2500000003 HC RX 250 WO HCPCS: Performed by: EMERGENCY MEDICINE

## 2023-09-19 PROCEDURE — 84145 PROCALCITONIN (PCT): CPT

## 2023-09-19 PROCEDURE — 87449 NOS EACH ORGANISM AG IA: CPT

## 2023-09-19 PROCEDURE — 87899 AGENT NOS ASSAY W/OPTIC: CPT

## 2023-09-19 PROCEDURE — 6360000002 HC RX W HCPCS: Performed by: EMERGENCY MEDICINE

## 2023-09-19 PROCEDURE — 2700000000 HC OXYGEN THERAPY PER DAY

## 2023-09-19 PROCEDURE — 82668 ASSAY OF ERYTHROPOIETIN: CPT

## 2023-09-19 PROCEDURE — 2000000000 HC ICU R&B

## 2023-09-19 PROCEDURE — 2580000003 HC RX 258: Performed by: EMERGENCY MEDICINE

## 2023-09-19 PROCEDURE — 83036 HEMOGLOBIN GLYCOSYLATED A1C: CPT

## 2023-09-19 PROCEDURE — 82805 BLOOD GASES W/O2 SATURATION: CPT

## 2023-09-19 PROCEDURE — 85025 COMPLETE CBC W/AUTO DIFF WBC: CPT

## 2023-09-19 PROCEDURE — 6360000002 HC RX W HCPCS: Performed by: CHIROPRACTOR

## 2023-09-19 PROCEDURE — 2500000003 HC RX 250 WO HCPCS: Performed by: STUDENT IN AN ORGANIZED HEALTH CARE EDUCATION/TRAINING PROGRAM

## 2023-09-19 PROCEDURE — 0202U NFCT DS 22 TRGT SARS-COV-2: CPT

## 2023-09-19 PROCEDURE — 82962 GLUCOSE BLOOD TEST: CPT

## 2023-09-19 PROCEDURE — 99223 1ST HOSP IP/OBS HIGH 75: CPT | Performed by: INTERNAL MEDICINE

## 2023-09-19 RX ORDER — INSULIN LISPRO 100 [IU]/ML
0-4 INJECTION, SOLUTION INTRAVENOUS; SUBCUTANEOUS 4 TIMES DAILY
Status: DISCONTINUED | OUTPATIENT
Start: 2023-09-19 | End: 2023-09-26

## 2023-09-19 RX ORDER — SODIUM CHLORIDE 0.9 % (FLUSH) 0.9 %
5-40 SYRINGE (ML) INJECTION EVERY 12 HOURS SCHEDULED
OUTPATIENT
Start: 2023-09-19

## 2023-09-19 RX ORDER — ROSUVASTATIN CALCIUM 20 MG/1
20 TABLET, COATED ORAL DAILY
OUTPATIENT
Start: 2023-09-19

## 2023-09-19 RX ORDER — BUPROPION HYDROCHLORIDE 150 MG/1
150 TABLET ORAL EVERY MORNING
Status: DISCONTINUED | OUTPATIENT
Start: 2023-09-19 | End: 2023-09-28 | Stop reason: HOSPADM

## 2023-09-19 RX ORDER — PREDNISONE 20 MG/1
40 TABLET ORAL DAILY
Status: COMPLETED | OUTPATIENT
Start: 2023-09-20 | End: 2023-09-22

## 2023-09-19 RX ORDER — IPRATROPIUM BROMIDE AND ALBUTEROL SULFATE 2.5; .5 MG/3ML; MG/3ML
1 SOLUTION RESPIRATORY (INHALATION) EVERY 4 HOURS PRN
Status: DISCONTINUED | OUTPATIENT
Start: 2023-09-19 | End: 2023-09-21

## 2023-09-19 RX ORDER — SODIUM CHLORIDE 0.9 % (FLUSH) 0.9 %
5-40 SYRINGE (ML) INJECTION PRN
Status: DISCONTINUED | OUTPATIENT
Start: 2023-09-19 | End: 2023-09-28 | Stop reason: HOSPADM

## 2023-09-19 RX ORDER — SODIUM CHLORIDE 9 MG/ML
INJECTION, SOLUTION INTRAVENOUS PRN
OUTPATIENT
Start: 2023-09-19

## 2023-09-19 RX ORDER — IPRATROPIUM BROMIDE AND ALBUTEROL SULFATE 2.5; .5 MG/3ML; MG/3ML
1 SOLUTION RESPIRATORY (INHALATION)
OUTPATIENT
Start: 2023-09-19

## 2023-09-19 RX ORDER — ONDANSETRON 4 MG/1
4 TABLET, ORALLY DISINTEGRATING ORAL EVERY 8 HOURS PRN
OUTPATIENT
Start: 2023-09-19

## 2023-09-19 RX ORDER — GUAIFENESIN/DEXTROMETHORPHAN 100-10MG/5
5 SYRUP ORAL EVERY 4 HOURS PRN
OUTPATIENT
Start: 2023-09-19

## 2023-09-19 RX ORDER — ACETAMINOPHEN 325 MG/1
650 TABLET ORAL EVERY 6 HOURS PRN
OUTPATIENT
Start: 2023-09-19

## 2023-09-19 RX ORDER — ALLOPURINOL 300 MG/1
300 TABLET ORAL DAILY
Status: DISCONTINUED | OUTPATIENT
Start: 2023-09-19 | End: 2023-09-28 | Stop reason: HOSPADM

## 2023-09-19 RX ORDER — SODIUM CHLORIDE 9 MG/ML
INJECTION, SOLUTION INTRAVENOUS PRN
Status: DISCONTINUED | OUTPATIENT
Start: 2023-09-19 | End: 2023-09-28 | Stop reason: HOSPADM

## 2023-09-19 RX ORDER — PREDNISONE 20 MG/1
20 TABLET ORAL 2 TIMES DAILY
OUTPATIENT
Start: 2023-09-21

## 2023-09-19 RX ORDER — DULOXETIN HYDROCHLORIDE 60 MG/1
60 CAPSULE, DELAYED RELEASE ORAL 2 TIMES DAILY
Status: DISCONTINUED | OUTPATIENT
Start: 2023-09-19 | End: 2023-09-28 | Stop reason: HOSPADM

## 2023-09-19 RX ORDER — DULOXETIN HYDROCHLORIDE 60 MG/1
60 CAPSULE, DELAYED RELEASE ORAL 2 TIMES DAILY
OUTPATIENT
Start: 2023-09-19

## 2023-09-19 RX ORDER — ROSUVASTATIN CALCIUM 20 MG/1
20 TABLET, COATED ORAL DAILY
Status: DISCONTINUED | OUTPATIENT
Start: 2023-09-19 | End: 2023-09-28 | Stop reason: HOSPADM

## 2023-09-19 RX ORDER — ACETAMINOPHEN 325 MG/1
650 TABLET ORAL EVERY 6 HOURS PRN
Status: DISCONTINUED | OUTPATIENT
Start: 2023-09-19 | End: 2023-09-28 | Stop reason: HOSPADM

## 2023-09-19 RX ORDER — BUPROPION HYDROCHLORIDE 150 MG/1
150 TABLET ORAL EVERY MORNING
OUTPATIENT
Start: 2023-09-19

## 2023-09-19 RX ORDER — ACETAMINOPHEN 650 MG/1
650 SUPPOSITORY RECTAL EVERY 6 HOURS PRN
OUTPATIENT
Start: 2023-09-19

## 2023-09-19 RX ORDER — ACETAMINOPHEN 650 MG/1
650 SUPPOSITORY RECTAL EVERY 6 HOURS PRN
Status: DISCONTINUED | OUTPATIENT
Start: 2023-09-19 | End: 2023-09-28 | Stop reason: HOSPADM

## 2023-09-19 RX ORDER — ENOXAPARIN SODIUM 100 MG/ML
30 INJECTION SUBCUTANEOUS 2 TIMES DAILY
OUTPATIENT
Start: 2023-09-19

## 2023-09-19 RX ORDER — LORAZEPAM 2 MG/ML
0.25 INJECTION INTRAMUSCULAR ONCE
Status: COMPLETED | OUTPATIENT
Start: 2023-09-19 | End: 2023-09-19

## 2023-09-19 RX ORDER — POTASSIUM CHLORIDE 750 MG/1
10 TABLET, EXTENDED RELEASE ORAL DAILY
OUTPATIENT
Start: 2023-09-19

## 2023-09-19 RX ORDER — ALLOPURINOL 300 MG/1
300 TABLET ORAL DAILY
OUTPATIENT
Start: 2023-09-19

## 2023-09-19 RX ORDER — ONDANSETRON 2 MG/ML
4 INJECTION INTRAMUSCULAR; INTRAVENOUS EVERY 6 HOURS PRN
Status: DISCONTINUED | OUTPATIENT
Start: 2023-09-19 | End: 2023-09-28 | Stop reason: HOSPADM

## 2023-09-19 RX ORDER — BUDESONIDE 0.5 MG/2ML
0.5 INHALANT ORAL
Status: DISCONTINUED | OUTPATIENT
Start: 2023-09-19 | End: 2023-09-28 | Stop reason: HOSPADM

## 2023-09-19 RX ORDER — PANTOPRAZOLE SODIUM 40 MG/1
40 TABLET, DELAYED RELEASE ORAL
Status: DISCONTINUED | OUTPATIENT
Start: 2023-09-19 | End: 2023-09-28 | Stop reason: HOSPADM

## 2023-09-19 RX ORDER — POLYETHYLENE GLYCOL 3350 17 G/17G
17 POWDER, FOR SOLUTION ORAL DAILY PRN
Status: DISCONTINUED | OUTPATIENT
Start: 2023-09-19 | End: 2023-09-28 | Stop reason: HOSPADM

## 2023-09-19 RX ORDER — DEXTROAMPHETAMINE SACCHARATE, AMPHETAMINE ASPARTATE MONOHYDRATE, DEXTROAMPHETAMINE SULFATE AND AMPHETAMINE SULFATE 2.5; 2.5; 2.5; 2.5 MG/1; MG/1; MG/1; MG/1
30 CAPSULE, EXTENDED RELEASE ORAL DAILY
Status: DISCONTINUED | OUTPATIENT
Start: 2023-09-19 | End: 2023-09-28 | Stop reason: HOSPADM

## 2023-09-19 RX ORDER — SODIUM CHLORIDE 0.9 % (FLUSH) 0.9 %
5-40 SYRINGE (ML) INJECTION EVERY 12 HOURS SCHEDULED
Status: DISCONTINUED | OUTPATIENT
Start: 2023-09-19 | End: 2023-09-28 | Stop reason: HOSPADM

## 2023-09-19 RX ORDER — LOSARTAN POTASSIUM AND HYDROCHLOROTHIAZIDE 25; 100 MG/1; MG/1
1 TABLET ORAL DAILY
OUTPATIENT
Start: 2023-09-19

## 2023-09-19 RX ORDER — ONDANSETRON 4 MG/1
4 TABLET, ORALLY DISINTEGRATING ORAL EVERY 8 HOURS PRN
Status: DISCONTINUED | OUTPATIENT
Start: 2023-09-19 | End: 2023-09-28 | Stop reason: HOSPADM

## 2023-09-19 RX ORDER — ONDANSETRON 2 MG/ML
4 INJECTION INTRAMUSCULAR; INTRAVENOUS EVERY 6 HOURS PRN
OUTPATIENT
Start: 2023-09-19

## 2023-09-19 RX ORDER — HEPARIN SODIUM 10000 [USP'U]/ML
5000 INJECTION, SOLUTION INTRAVENOUS; SUBCUTANEOUS EVERY 8 HOURS SCHEDULED
Status: DISCONTINUED | OUTPATIENT
Start: 2023-09-19 | End: 2023-09-28 | Stop reason: HOSPADM

## 2023-09-19 RX ORDER — IPRATROPIUM BROMIDE AND ALBUTEROL SULFATE 2.5; .5 MG/3ML; MG/3ML
1 SOLUTION RESPIRATORY (INHALATION)
Status: DISCONTINUED | OUTPATIENT
Start: 2023-09-19 | End: 2023-09-28 | Stop reason: HOSPADM

## 2023-09-19 RX ORDER — ARFORMOTEROL TARTRATE 15 UG/2ML
15 SOLUTION RESPIRATORY (INHALATION)
Status: DISCONTINUED | OUTPATIENT
Start: 2023-09-19 | End: 2023-09-28 | Stop reason: HOSPADM

## 2023-09-19 RX ORDER — DEXTROSE MONOHYDRATE 100 MG/ML
INJECTION, SOLUTION INTRAVENOUS CONTINUOUS PRN
Status: DISCONTINUED | OUTPATIENT
Start: 2023-09-19 | End: 2023-09-28 | Stop reason: HOSPADM

## 2023-09-19 RX ORDER — SODIUM CHLORIDE 0.9 % (FLUSH) 0.9 %
5-40 SYRINGE (ML) INJECTION PRN
OUTPATIENT
Start: 2023-09-19

## 2023-09-19 RX ADMIN — METHYLPREDNISOLONE SODIUM SUCCINATE 40 MG: 40 INJECTION, POWDER, LYOPHILIZED, FOR SOLUTION INTRAMUSCULAR; INTRAVENOUS at 17:26

## 2023-09-19 RX ADMIN — DULOXETINE HYDROCHLORIDE 60 MG: 60 CAPSULE, DELAYED RELEASE ORAL at 09:31

## 2023-09-19 RX ADMIN — SODIUM CHLORIDE 1000 ML: 9 INJECTION, SOLUTION INTRAVENOUS at 00:04

## 2023-09-19 RX ADMIN — Medication 10 ML: at 09:32

## 2023-09-19 RX ADMIN — ROSUVASTATIN CALCIUM 20 MG: 20 TABLET, FILM COATED ORAL at 09:35

## 2023-09-19 RX ADMIN — ARFORMOTEROL TARTRATE 15 MCG: 15 SOLUTION RESPIRATORY (INHALATION) at 19:55

## 2023-09-19 RX ADMIN — DEXTROAMPHETAMINE SULFATE, DEXTROAMPHETAMINE SACCHARATE, AMPHETAMINE SULFATE AND AMPHETAMINE ASPARTATE 30 MG: 2.5; 2.5; 2.5; 2.5 CAPSULE, EXTENDED RELEASE ORAL at 09:31

## 2023-09-19 RX ADMIN — HEPARIN SODIUM 5000 UNITS: 10000 INJECTION INTRAVENOUS; SUBCUTANEOUS at 13:34

## 2023-09-19 RX ADMIN — BUPROPION HYDROCHLORIDE 150 MG: 150 TABLET, EXTENDED RELEASE ORAL at 09:31

## 2023-09-19 RX ADMIN — METHYLPREDNISOLONE SODIUM SUCCINATE 40 MG: 40 INJECTION, POWDER, LYOPHILIZED, FOR SOLUTION INTRAMUSCULAR; INTRAVENOUS at 12:15

## 2023-09-19 RX ADMIN — DOXYCYCLINE 100 MG: 100 INJECTION, POWDER, LYOPHILIZED, FOR SOLUTION INTRAVENOUS at 00:49

## 2023-09-19 RX ADMIN — IPRATROPIUM BROMIDE AND ALBUTEROL SULFATE 1 DOSE: .5; 2.5 SOLUTION RESPIRATORY (INHALATION) at 16:39

## 2023-09-19 RX ADMIN — METHYLPREDNISOLONE SODIUM SUCCINATE 40 MG: 40 INJECTION, POWDER, LYOPHILIZED, FOR SOLUTION INTRAMUSCULAR; INTRAVENOUS at 22:00

## 2023-09-19 RX ADMIN — ALLOPURINOL 300 MG: 300 TABLET ORAL at 09:31

## 2023-09-19 RX ADMIN — PANTOPRAZOLE SODIUM 40 MG: 40 TABLET, DELAYED RELEASE ORAL at 05:44

## 2023-09-19 RX ADMIN — BUDESONIDE INHALATION 500 MCG: 0.5 SUSPENSION RESPIRATORY (INHALATION) at 08:01

## 2023-09-19 RX ADMIN — WATER 1000 MG: 1 INJECTION INTRAMUSCULAR; INTRAVENOUS; SUBCUTANEOUS at 00:40

## 2023-09-19 RX ADMIN — DOXYCYCLINE 100 MG: 100 INJECTION, POWDER, LYOPHILIZED, FOR SOLUTION INTRAVENOUS at 23:55

## 2023-09-19 RX ADMIN — IPRATROPIUM BROMIDE AND ALBUTEROL SULFATE 1 DOSE: .5; 2.5 SOLUTION RESPIRATORY (INHALATION) at 12:12

## 2023-09-19 RX ADMIN — IPRATROPIUM BROMIDE AND ALBUTEROL SULFATE 1 DOSE: .5; 2.5 SOLUTION RESPIRATORY (INHALATION) at 08:01

## 2023-09-19 RX ADMIN — Medication 10 ML: at 19:47

## 2023-09-19 RX ADMIN — HEPARIN SODIUM 5000 UNITS: 10000 INJECTION INTRAVENOUS; SUBCUTANEOUS at 22:00

## 2023-09-19 RX ADMIN — DOXYCYCLINE 100 MG: 100 INJECTION, POWDER, LYOPHILIZED, FOR SOLUTION INTRAVENOUS at 12:22

## 2023-09-19 RX ADMIN — ARFORMOTEROL TARTRATE 15 MCG: 15 SOLUTION RESPIRATORY (INHALATION) at 08:01

## 2023-09-19 RX ADMIN — WATER 1000 MG: 1 INJECTION INTRAMUSCULAR; INTRAVENOUS; SUBCUTANEOUS at 23:44

## 2023-09-19 RX ADMIN — IPRATROPIUM BROMIDE AND ALBUTEROL SULFATE 1 DOSE: .5; 2.5 SOLUTION RESPIRATORY (INHALATION) at 19:55

## 2023-09-19 RX ADMIN — BUDESONIDE INHALATION 500 MCG: 0.5 SUSPENSION RESPIRATORY (INHALATION) at 19:55

## 2023-09-19 RX ADMIN — DULOXETINE HYDROCHLORIDE 60 MG: 60 CAPSULE, DELAYED RELEASE ORAL at 19:47

## 2023-09-19 RX ADMIN — LORAZEPAM 0.26 MG: 2 INJECTION INTRAMUSCULAR; INTRAVENOUS at 01:11

## 2023-09-19 RX ADMIN — METHYLPREDNISOLONE SODIUM SUCCINATE 40 MG: 40 INJECTION, POWDER, LYOPHILIZED, FOR SOLUTION INTRAMUSCULAR; INTRAVENOUS at 05:41

## 2023-09-19 ASSESSMENT — PAIN SCALES - GENERAL
PAINLEVEL_OUTOF10: 0
PAINLEVEL_OUTOF10: 0

## 2023-09-19 ASSESSMENT — ENCOUNTER SYMPTOMS
EYES NEGATIVE: 1
SHORTNESS OF BREATH: 1
COUGH: 1
GASTROINTESTINAL NEGATIVE: 1

## 2023-09-19 NOTE — PROGRESS NOTES
4 Eyes Skin Assessment     NAME:  Nia Hilario  YOB: 1965  MEDICAL RECORD NUMBER:  [id number]    The patient is being assessed for  Admission    I agree that at least one RN has performed a thorough Head to Toe Skin Assessment on the patient. ALL assessment sites listed below have been assessed. Areas assessed by both nurses:    Head, Face, Ears, Shoulders, Back, Chest, Arms, Elbows, Hands, Sacrum. Buttock, Coccyx, Ischium, Legs. Feet and Heels, and Under Medical Devices         Does the Patient have a Wound?  No noted wound(s)       Saeid Prevention initiated by RN: No  Wound Care Orders initiated by RN: No    Pressure Injury (Stage 3,4, Unstageable, DTI, NWPT, and Complex wounds) if present, place Wound referral order by RN under : No    New Ostomies, if present place, Ostomy referral order under : No     Nurse 1 eSignature: Electronically signed by Sirena Wellington RN on 9/19/23 at 3:47 AM EDT    **SHARE this note so that the co-signing nurse can place an eSignature**    Nurse 2 eSignature: Electronically signed by Sheryl Lee RN on 9/19/23 at 3:58 AM EDT

## 2023-09-19 NOTE — PLAN OF CARE
Problem: Respiratory - Adult  Goal: Achieves optimal ventilation and oxygenation  9/19/2023 0825 by Bandar Jon RN  Outcome: Progressing     Problem: ABCDS Injury Assessment  Goal: Absence of physical injury  9/19/2023 0825 by Bandar oJn RN  Outcome: Progressing     Problem: Safety - Adult  Goal: Free from fall injury  9/19/2023 0825 by Bandar Jon RN  Outcome: Progressing     Problem: Skin/Tissue Integrity  Goal: Absence of new skin breakdown  Description: 1. Monitor for areas of redness and/or skin breakdown  2. Assess vascular access sites hourly  3. Every 4-6 hours minimum:  Change oxygen saturation probe site  4. Every 4-6 hours:  If on nasal continuous positive airway pressure, respiratory therapy assess nares and determine need for appliance change or resting period. Outcome: Progressing   Plan of care discussed with patient / family.

## 2023-09-19 NOTE — H&P
[facility]  History and Physical      CHIEF COMPLAINT:    Chief Complaint   Patient presents with    Shortness of Breath     X 2days; spo2 62% in triage        Patient of Adams Chan DO presents with:  Acute respiratory failure with hypoxia ([address])    History of Present Illness:   Patient is a 57-year-old female with past medical history of ADHD, COPD, depression, GERD, gout, HLD, HTN, IBS, narcolepsy who presents to the emergency room for shortness of breath that has been ongoing 2 days prior to arrival.  On arrival to emergency patient was found to be 60% on room air in triage. Patient was placed on nonrebreather. Labs were obtained and patient was found to have a lactic acid of 3.0, hyperglycemia of 233, elevated proBNP of 993, PCO2 of 70.2, alk phos of 169, elevated H&H of 18.5/56.2, D-dimer of 428 with a respiratory panel positive for rhinovirus. A chest x-ray was obtained which revealed no acute disease. Patient was placed on BiPAP and was accepted into the ICU for closer monitoring and further work-up and treatment. On evaluation she has noninvasive ventilation in place, resting comfortably in no acute distress. Her vital signs including saturation is within normal limits. She is somewhat somnolent and indicates she feels very tired but otherwise alert and able to answer all questions appropriately. No significant shortness of breath currently. REVIEW OF SYSTEMS:  Pertinent negatives are above in HPI. 10 point ROS otherwise negative.       Past Medical History:   Diagnosis Date    ADHD (attention deficit hyperactivity disorder)     COPD (chronic obstructive pulmonary disease) (HCC)     Depression     GERD (gastroesophageal reflux disease)     Gout     Headache     migraines    Hyperlipidemia     Hypertension     Irritable bowel syndrome     Narcolepsy          Past Surgical History:   Procedure Laterality Date    TUBAL LIGATION         Medications Prior to Admission: hypoxia ([address])  Active Problems:    Acute respiratory failure ([address])  Resolved Problems:    * No resolved hospital problems. *    Patient is a 57-year-old female admitted to ICU for  Acute respiratory failure with hypoxia and hypercapnia  -Monitor labs  -proBNP 993  -PCO2 70.2 on arrival > 73.7  -Lactic acid 3.0 > 1.6  -Respiratory panel positive for rhinovirus-continue supportive care  -Procalcitonin 0.11--empiric IV antibiotic therapy in setting of COPD  -continue IV Rocephin and Vibramycin  -Continue scheduled breathing treatments  -IV Solu-Medrol 40 mg every 6 hours-taper at discretion of pulmonology  -Currently on BiPAP with FiO2 of 40% satting 97%  -Will need a sleep study at discharge along with home noninvasive ventilation-likely has obesity hypoventilation syndrome    diabetes mellitus  -Continue to monitor blood glucose levels  -Continue low-dose corrective ISS    Medication for other comorbidities continue as appropriate dose adjustment as necessary.     DVT prophylaxis Heparin   PT OT  Discharge planning      Code status: Full  Requires Inpatient level of care    Esvin Nash MD

## 2023-09-19 NOTE — PROGRESS NOTES
Attempted to take pt off bipap for a break to a NC. Pt had been satting 100% on 40% and stated her breathing had improved with normal WOB watching TV. Pt was placed on a 4L NC and despite her SpO2 remaining to be 100%, pt had immediate increased WOB. Pt was then placed back on BIPAP. After a few minutes pts WOB improved. RN notified.

## 2023-09-19 NOTE — PROGRESS NOTES
Date: 9/19/2023    Time: 4:00 AM    Patient Placed On BIPAP/CPAP/ Non-Invasive Ventilation? Yes    If no must comment. Facial area red/color change? No           If YES are Blister/Lesion present? No   If yes must notify nursing staff  BIPAP/CPAP skin barrier?   Yes    Skin barrier type:mepilexlite       Comments:        Santi Sahu RCP

## 2023-09-19 NOTE — CARE COORDINATION
Care Coordination: Admit today from ED, per chart review, copd ac exacerbation. Continuous bipap, no family at bedside, Rocephin, Vibramycin, solumedrol, will follow for transition of care needs.       Electronically signed by Viridiana Goode RN on 9/19/2023 at 1:35 PM

## 2023-09-19 NOTE — ED NOTES
[facility]       BRITTANY Connors, RN  09/19/23 [id number]

## 2023-09-19 NOTE — ED PROVIDER NOTES
HPI:  9/18/23, Time: 11:14 PM EDT      Shelby Holliday is a 59 y.o. female presenting to the ED for gradual onset severe shortness of breath with wheezing, beginning 3 days ago, progressively worsening. The complaint has been persistent, severe in severity, and worsened by exertion. She has had some chest tightness but no chest heaviness or pressure associated. She has not had any reported fever/chills, no known exposures, no nausea vomiting, no abdominal pain, no fever/chills associated. The patient is in moderate respiratory distress at the time of my initial assessment I was called urgently to her bedside shortly after her arrival.  Despite this, the patient is able to speak in 3-4 word sentences. She has not had any syncope nor any near syncopal episodes, no colored sputum production or mops this. She states that she quit smoking 2 days ago but she does have a 40+ pack year history of tobacco use. She does also have multiple coronary artery disease risk factors: BMI greater than 45, positive history of hypertension, positive history of hyperlipidemia but no prior history of ACS/MI nor PTCA/Coronary Stenting in the past.    Review of Systems:   A complete review of systems was performed and pertinent positives and negatives are stated within HPI, all other systems reviewed and are negative.    --------------------------------------------- PAST HISTORY ---------------------------------------------  Past Medical History:  has a past medical history of ADHD (attention deficit hyperactivity disorder), COPD (chronic obstructive pulmonary disease) ([address]), Depression, GERD (gastroesophageal reflux disease), Gout, Headache, Hyperlipidemia, Hypertension, Irritable bowel syndrome, and Narcolepsy. Past Surgical History:  has a past surgical history that includes Tubal ligation. Social History:  reports that she has been smoking cigarettes. She has a 42.00 pack-year smoking history.  She has been exposed to

## 2023-09-19 NOTE — ED NOTES
Patient brought in by son for shortness of breath and wheezing x 2 days. Dyspnea at rest and wheezing noted. SpO2 62% on RA in triage. Non rebreather placed at 10 lpm. Dr. Chema Orellana notified and at bedside. Breathing treatments initiated and IV established. SpO2 98%.       BRITTANY Connors RN  09/18/23 1033

## 2023-09-19 NOTE — CONSULTS
[facility]  Internal Medicine Residency Program  Consult  MICU    Patient:  Nilsa Silva 59 y.o. female MRN: [id number]     Date of Service: 9/19/2023    Hospital Day: 2      Chief complaint: Shortness of breath   History of Present Illness   The patient is a 59 y.o. female with a PMHx of COPD, HTN, HLD, Migraine headaches, Depression, ADHD, GERD, Gout who is coming in for shortness of breath. She reports 4 day history of colds and cough, with difficulty expectorating sputum. Noted some episodes of chills, but no documented fever. Patient is compliant with Trelegy (used once a day). Denies exposure/close contact with other sick individuals. Patient is an active smoker (1 pack per day). She was seen at [facility], noted to be saturating at 62% on room air. On initial assessment, noted to be in moderate respiratory distress, only able to speak 3-4 work sentences. Given 2 duoneb aerosols and solumedrol 125 mg IV. ABG showed resp acidosis pH 7.29, CO2 70, HCO3 33.7, saturating 98% on 100% NRB mask. Patient was then switched to BIPAP. Workup showed leukocytosis 11.4, hgb 18.5, 78% neutrophils. Blood glucose 233, normal AG. Lactic acid 3.0 > 1.6 after IV fluid administration. D dimer 428. . Patient was then transferred to [facility] MICU for further evaluation and management. During assessment, patient reports feeling more comfortable. Able to speak in full sentences. Denies any chest pain, leg pain, abdominal pain. Tolerating bipap.      Past Medical History:      Diagnosis Date    ADHD (attention deficit hyperactivity disorder)     COPD (chronic obstructive pulmonary disease) ([facility])     Depression     GERD (gastroesophageal reflux disease)     Gout     Headache     migraines    Hyperlipidemia     Hypertension     Irritable bowel syndrome     Narcolepsy        Past Surgical History:        Procedure Laterality Date    TUBAL LIGATION         Medications Prior to Admission:    Prior to Admission Component Value Date/Time    WBC 11.4 09/18/2023 11:15 PM    RBC 5.51 09/18/2023 11:15 PM    HGB 18.5 09/18/2023 11:15 PM    HCT 56.2 09/18/2023 11:15 PM    .0 09/18/2023 11:15 PM    MCH 33.6 09/18/2023 11:15 PM    MCHC 32.9 09/18/2023 11:15 PM    RDW 15.3 09/18/2023 11:15 PM     09/18/2023 11:15 PM    MPV 9.9 09/18/2023 11:15 PM     CMP:    Lab Results   Component Value Date/Time     09/18/2023 11:15 PM    K 3.8 09/18/2023 11:15 PM    CL 96 09/18/2023 11:15 PM    CO2 29 09/18/2023 11:15 PM    BUN 14 09/18/2023 11:15 PM    CREATININE 0.8 09/18/2023 11:15 PM    GFRAA >60 10/14/2022 08:13 AM    LABGLOM >60 09/18/2023 11:15 PM    GLUCOSE 233 09/18/2023 11:15 PM    PROT 8.2 09/18/2023 11:15 PM    LABALBU 3.8 09/18/2023 11:15 PM    CALCIUM 9.8 09/18/2023 11:15 PM    BILITOT 0.5 09/18/2023 11:15 PM    ALKPHOS 169 09/18/2023 11:15 PM    AST 21 09/18/2023 11:15 PM    ALT 14 09/18/2023 11:15 PM     Imaging Studies:     XR CHEST PORTABLE    Result Date: 9/19/2023  EXAMINATION: ONE XRAY VIEW OF THE CHEST 9/18/2023 11:48 pm COMPARISON: None. HISTORY: ORDERING SYSTEM PROVIDED HISTORY: dyspnea w/ Hypoxia TECHNOLOGIST PROVIDED HISTORY: Reason for exam:->dyspnea w/ Hypoxia FINDINGS: Normal cardiomediastinal silhouette. Lungs clear. No pneumothorax or effusion. Body wall soft tissues unremarkable. Osseous thorax intact. No acute disease. RECOMMENDATION: Careful clinical correlation and follow up recommended. Resident's Assessment and Plan     Sunshine Pastor is a 59 y.o. female who initially came in for shortness of breath.      Neurologic  Hx of Migraine headaches, stable  Home tx: Rimegepant sulfate   Currently denies headache    Cardiovascular  Hx of Hypertension  Home tx: Losartan-HCTZ 100-25 mg OD   Plan:  - Hold for now     Hx of Hyperlipidemia  Home tx: Rosuvastatin 20 mg OD   Plan:   - Continue Rosuvastatin     Pulmonary  COPD in acute exacerbation   Home tx: Trelegy ellipta

## 2023-09-19 NOTE — PLAN OF CARE
Problem: Discharge Planning  Goal: Discharge to home or other facility with appropriate resources  Outcome: Progressing     Problem: Respiratory - Adult  Goal: Achieves optimal ventilation and oxygenation  Outcome: Progressing     Problem: ABCDS Injury Assessment  Goal: Absence of physical injury  Outcome: Progressing     Problem: Safety - Adult  Goal: Free from fall injury  Outcome: Progressing

## 2023-09-19 NOTE — PROGRESS NOTES
Patient admitted to MICU with the following belongings: shirt, pants, undergarments, shoes, and cell phone. Patient has belongings at bedside.

## 2023-09-19 NOTE — ED NOTES
Two duonebs administered to patient. Patient wheezing but pt feels a little better.  Non rebreather placed at 10lpm.      Savannah Delacruz RN  09/18/23 0760

## 2023-09-20 ENCOUNTER — APPOINTMENT (OUTPATIENT)
Dept: GENERAL RADIOLOGY | Age: 58
DRG: 189 | End: 2023-09-20
Payer: COMMERCIAL

## 2023-09-20 ENCOUNTER — APPOINTMENT (OUTPATIENT)
Dept: ULTRASOUND IMAGING | Age: 58
DRG: 189 | End: 2023-09-20
Payer: COMMERCIAL

## 2023-09-20 LAB
AADO2: 89.6 MMHG
ALBUMIN SERPL-MCNC: 3.4 G/DL (ref 3.5–5.2)
ALP SERPL-CCNC: 120 U/L (ref 35–104)
ALT SERPL-CCNC: 15 U/L (ref 0–32)
ANION GAP SERPL CALCULATED.3IONS-SCNC: 12 MMOL/L (ref 7–16)
AST SERPL-CCNC: 14 U/L (ref 0–31)
B.E.: 1.5 MMOL/L (ref -3–3)
BASOPHILS # BLD: 0.01 K/UL (ref 0–0.2)
BASOPHILS NFR BLD: 0 % (ref 0–2)
BILIRUB SERPL-MCNC: 0.2 MG/DL (ref 0–1.2)
BUN SERPL-MCNC: 22 MG/DL (ref 6–20)
CALCIUM SERPL-MCNC: 8.8 MG/DL (ref 8.6–10.2)
CHLORIDE SERPL-SCNC: 101 MMOL/L (ref 98–107)
CO2 SERPL-SCNC: 29 MMOL/L (ref 22–29)
COHB: 1.5 % (ref 0–1.5)
CREAT SERPL-MCNC: 0.9 MG/DL (ref 0.5–1)
CRITICAL: ABNORMAL
DATE ANALYZED: ABNORMAL
DATE OF COLLECTION: ABNORMAL
EOSINOPHIL # BLD: 0 K/UL (ref 0.05–0.5)
EOSINOPHILS RELATIVE PERCENT: 0 % (ref 0–6)
ERYTHROCYTE [DISTWIDTH] IN BLOOD BY AUTOMATED COUNT: 15.3 % (ref 11.5–15)
FIO2: 35 %
GFR SERPL CREATININE-BSD FRML MDRD: >60 ML/MIN/1.73M2
GLUCOSE BLD-MCNC: 116 MG/DL (ref 74–99)
GLUCOSE BLD-MCNC: 124 MG/DL (ref 74–99)
GLUCOSE BLD-MCNC: 129 MG/DL (ref 74–99)
GLUCOSE BLD-MCNC: 130 MG/DL (ref 74–99)
GLUCOSE SERPL-MCNC: 138 MG/DL (ref 74–99)
HCO3: 28.9 MMOL/L (ref 22–26)
HCT VFR BLD AUTO: 46.6 % (ref 34–48)
HGB BLD-MCNC: 14.8 G/DL (ref 11.5–15.5)
HHB: 4 % (ref 0–5)
IMM GRANULOCYTES # BLD AUTO: <0.03 K/UL (ref 0–0.58)
IMM GRANULOCYTES NFR BLD: 0 % (ref 0–5)
L PNEUMO1 AG UR QL IA.RAPID: NEGATIVE
LAB: ABNORMAL
LYMPHOCYTES NFR BLD: 0.54 K/UL (ref 1.5–4)
LYMPHOCYTES RELATIVE PERCENT: 9 % (ref 20–42)
Lab: 439
MAGNESIUM SERPL-MCNC: 2.6 MG/DL (ref 1.6–2.6)
MCH RBC QN AUTO: 33.3 PG (ref 26–35)
MCHC RBC AUTO-ENTMCNC: 31.8 G/DL (ref 32–34.5)
MCV RBC AUTO: 105 FL (ref 80–99.9)
METHB: 0.3 % (ref 0–1.5)
MICROORGANISM SPEC CULT: ABNORMAL
MICROORGANISM/AGENT SPEC: ABNORMAL
MODE: ABNORMAL
MONOCYTES NFR BLD: 0.18 K/UL (ref 0.1–0.95)
MONOCYTES NFR BLD: 3 % (ref 2–12)
NEUTROPHILS NFR BLD: 88 % (ref 43–80)
NEUTS SEG NFR BLD: 5.31 K/UL (ref 1.8–7.3)
O2 CONTENT: 20.6 ML/DL
O2 SATURATION: 95.9 % (ref 92–98.5)
O2HB: 94.2 % (ref 94–97)
OPERATOR ID: 2593
PATIENT TEMP: 37 C
PCO2: 56 MMHG (ref 35–45)
PEEP/CPAP: 5 CMH2O
PFO2: 2.46 MMHG/%
PH BLOOD GAS: 7.33 (ref 7.35–7.45)
PHOSPHATE SERPL-MCNC: 3.7 MG/DL (ref 2.5–4.5)
PLATELET # BLD AUTO: 202 K/UL (ref 130–450)
PMV BLD AUTO: 10.2 FL (ref 7–12)
PO2: 86.1 MMHG (ref 75–100)
POTASSIUM SERPL-SCNC: 4.4 MMOL/L (ref 3.5–5)
PROT SERPL-MCNC: 6.8 G/DL (ref 6.4–8.3)
RBC # BLD AUTO: 4.44 M/UL (ref 3.5–5.5)
RBC # BLD: ABNORMAL 10*6/UL
RBC # BLD: ABNORMAL 10*6/UL
RI(T): 1.04
RR MECHANICAL: 14 B/MIN
S PNEUM AG SPEC QL: NEGATIVE
SODIUM SERPL-SCNC: 142 MMOL/L (ref 132–146)
SOURCE, BLOOD GAS: ABNORMAL
SPECIMEN DESCRIPTION: ABNORMAL
SPECIMEN SOURCE: NORMAL
THB: 15.5 G/DL (ref 11.5–16.5)
TIME ANALYZED: 448
VT MECHANICAL: 450 ML
WBC # BLD: ABNORMAL 10*3/UL
WBC OTHER # BLD: 6.1 K/UL (ref 4.5–11.5)

## 2023-09-20 PROCEDURE — 6370000000 HC RX 637 (ALT 250 FOR IP): Performed by: INTERNAL MEDICINE

## 2023-09-20 PROCEDURE — 2000000000 HC ICU R&B

## 2023-09-20 PROCEDURE — 71045 X-RAY EXAM CHEST 1 VIEW: CPT

## 2023-09-20 PROCEDURE — 6360000002 HC RX W HCPCS: Performed by: STUDENT IN AN ORGANIZED HEALTH CARE EDUCATION/TRAINING PROGRAM

## 2023-09-20 PROCEDURE — 2580000003 HC RX 258: Performed by: STUDENT IN AN ORGANIZED HEALTH CARE EDUCATION/TRAINING PROGRAM

## 2023-09-20 PROCEDURE — 99233 SBSQ HOSP IP/OBS HIGH 50: CPT | Performed by: INTERNAL MEDICINE

## 2023-09-20 PROCEDURE — 2500000003 HC RX 250 WO HCPCS: Performed by: STUDENT IN AN ORGANIZED HEALTH CARE EDUCATION/TRAINING PROGRAM

## 2023-09-20 PROCEDURE — 82962 GLUCOSE BLOOD TEST: CPT

## 2023-09-20 PROCEDURE — 51798 US URINE CAPACITY MEASURE: CPT

## 2023-09-20 PROCEDURE — 6370000000 HC RX 637 (ALT 250 FOR IP): Performed by: STUDENT IN AN ORGANIZED HEALTH CARE EDUCATION/TRAINING PROGRAM

## 2023-09-20 PROCEDURE — 82805 BLOOD GASES W/O2 SATURATION: CPT

## 2023-09-20 PROCEDURE — 80053 COMPREHEN METABOLIC PANEL: CPT

## 2023-09-20 PROCEDURE — 85025 COMPLETE CBC W/AUTO DIFF WBC: CPT

## 2023-09-20 PROCEDURE — 2700000000 HC OXYGEN THERAPY PER DAY

## 2023-09-20 PROCEDURE — 84100 ASSAY OF PHOSPHORUS: CPT

## 2023-09-20 PROCEDURE — 51701 INSERT BLADDER CATHETER: CPT

## 2023-09-20 PROCEDURE — 6360000002 HC RX W HCPCS: Performed by: CHIROPRACTOR

## 2023-09-20 PROCEDURE — 83735 ASSAY OF MAGNESIUM: CPT

## 2023-09-20 PROCEDURE — 94660 CPAP INITIATION&MGMT: CPT

## 2023-09-20 PROCEDURE — 94640 AIRWAY INHALATION TREATMENT: CPT

## 2023-09-20 PROCEDURE — 93970 EXTREMITY STUDY: CPT

## 2023-09-20 RX ORDER — MAGNESIUM SULFATE IN WATER 40 MG/ML
2000 INJECTION, SOLUTION INTRAVENOUS ONCE
Status: COMPLETED | OUTPATIENT
Start: 2023-09-20 | End: 2023-09-20

## 2023-09-20 RX ADMIN — IPRATROPIUM BROMIDE AND ALBUTEROL SULFATE 1 DOSE: .5; 2.5 SOLUTION RESPIRATORY (INHALATION) at 08:41

## 2023-09-20 RX ADMIN — IPRATROPIUM BROMIDE AND ALBUTEROL SULFATE 1 DOSE: .5; 2.5 SOLUTION RESPIRATORY (INHALATION) at 17:10

## 2023-09-20 RX ADMIN — HEPARIN SODIUM 5000 UNITS: 10000 INJECTION INTRAVENOUS; SUBCUTANEOUS at 06:02

## 2023-09-20 RX ADMIN — IPRATROPIUM BROMIDE AND ALBUTEROL SULFATE 1 DOSE: .5; 2.5 SOLUTION RESPIRATORY (INHALATION) at 00:24

## 2023-09-20 RX ADMIN — ARFORMOTEROL TARTRATE 15 MCG: 15 SOLUTION RESPIRATORY (INHALATION) at 21:08

## 2023-09-20 RX ADMIN — HEPARIN SODIUM 5000 UNITS: 10000 INJECTION INTRAVENOUS; SUBCUTANEOUS at 21:35

## 2023-09-20 RX ADMIN — PANTOPRAZOLE SODIUM 40 MG: 40 TABLET, DELAYED RELEASE ORAL at 06:02

## 2023-09-20 RX ADMIN — DULOXETINE HYDROCHLORIDE 60 MG: 60 CAPSULE, DELAYED RELEASE ORAL at 08:58

## 2023-09-20 RX ADMIN — METHYLPREDNISOLONE SODIUM SUCCINATE 125 MG: 125 INJECTION, POWDER, LYOPHILIZED, FOR SOLUTION INTRAMUSCULAR; INTRAVENOUS at 12:23

## 2023-09-20 RX ADMIN — DULOXETINE HYDROCHLORIDE 60 MG: 60 CAPSULE, DELAYED RELEASE ORAL at 20:37

## 2023-09-20 RX ADMIN — ROSUVASTATIN CALCIUM 20 MG: 20 TABLET, FILM COATED ORAL at 08:59

## 2023-09-20 RX ADMIN — PREDNISONE 40 MG: 20 TABLET ORAL at 09:01

## 2023-09-20 RX ADMIN — DOXYCYCLINE 100 MG: 100 INJECTION, POWDER, LYOPHILIZED, FOR SOLUTION INTRAVENOUS at 12:18

## 2023-09-20 RX ADMIN — DEXTROAMPHETAMINE SULFATE, DEXTROAMPHETAMINE SACCHARATE, AMPHETAMINE SULFATE AND AMPHETAMINE ASPARTATE 30 MG: 2.5; 2.5; 2.5; 2.5 CAPSULE, EXTENDED RELEASE ORAL at 08:58

## 2023-09-20 RX ADMIN — IPRATROPIUM BROMIDE AND ALBUTEROL SULFATE 1 DOSE: .5; 2.5 SOLUTION RESPIRATORY (INHALATION) at 11:40

## 2023-09-20 RX ADMIN — ARFORMOTEROL TARTRATE 15 MCG: 15 SOLUTION RESPIRATORY (INHALATION) at 08:41

## 2023-09-20 RX ADMIN — Medication 10 ML: at 08:59

## 2023-09-20 RX ADMIN — BUPROPION HYDROCHLORIDE 150 MG: 150 TABLET, EXTENDED RELEASE ORAL at 08:58

## 2023-09-20 RX ADMIN — ALLOPURINOL 300 MG: 300 TABLET ORAL at 08:58

## 2023-09-20 RX ADMIN — HEPARIN SODIUM 5000 UNITS: 10000 INJECTION INTRAVENOUS; SUBCUTANEOUS at 13:30

## 2023-09-20 RX ADMIN — BUDESONIDE INHALATION 500 MCG: 0.5 SUSPENSION RESPIRATORY (INHALATION) at 08:41

## 2023-09-20 RX ADMIN — Medication 10 ML: at 21:04

## 2023-09-20 RX ADMIN — BUDESONIDE INHALATION 500 MCG: 0.5 SUSPENSION RESPIRATORY (INHALATION) at 21:08

## 2023-09-20 RX ADMIN — MAGNESIUM SULFATE HEPTAHYDRATE 2000 MG: 40 INJECTION, SOLUTION INTRAVENOUS at 12:19

## 2023-09-20 RX ADMIN — IPRATROPIUM BROMIDE AND ALBUTEROL SULFATE 1 DOSE: .5; 2.5 SOLUTION RESPIRATORY (INHALATION) at 21:08

## 2023-09-20 ASSESSMENT — PAIN SCALES - GENERAL
PAINLEVEL_OUTOF10: 0
PAINLEVEL_OUTOF10: 0

## 2023-09-20 NOTE — PLAN OF CARE
Problem: Discharge Planning  Goal: Discharge to home or other facility with appropriate resources  Outcome: Progressing     Problem: Respiratory - Adult  Goal: Achieves optimal ventilation and oxygenation  9/19/2023 2133 by Leah Irwin RN  Outcome: Progressing     Problem: ABCDS Injury Assessment  Goal: Absence of physical injury  9/19/2023 2133 by Leah Irwin RN  Outcome: Progressing     Problem: Safety - Adult  Goal: Free from fall injury  9/19/2023 2133 by Leah Irwin RN  Outcome: Progressing     Problem: Skin/Tissue Integrity  Goal: Absence of new skin breakdown  Description: 1. Monitor for areas of redness and/or skin breakdown  2. Assess vascular access sites hourly  3. Every 4-6 hours minimum:  Change oxygen saturation probe site  4. Every 4-6 hours:  If on nasal continuous positive airway pressure, respiratory therapy assess nares and determine need for appliance change or resting period.   9/19/2023 2133 by Leah Irwin RN  Outcome: Progressing     Problem: Pain  Goal: Verbalizes/displays adequate comfort level or baseline comfort level  Outcome: Progressing

## 2023-09-20 NOTE — PROGRESS NOTES
Date: 9/20/2023    Time: 5:19 PM    Patient Placed On BIPAP/CPAP/ Non-Invasive Ventilation? Yes    If no must comment. Facial area red/color change? No           If YES are Blister/Lesion present? No   If yes must notify nursing staff  BIPAP/CPAP skin barrier?   Yes    Skin barrier type:mepilexlite       Comments: Patient wanted to go on the BIPAP before dinner comes         Pam Mahmood RCP

## 2023-09-20 NOTE — CARE COORDINATION
Care Coordination: LOS 1 day, continuous bipap. Doxycycline. Spoke to son Raymondo Gowers via phone. He lives down the street from mom, other relatives are 5 hours away. She is completely independent pta. No hx of hhc, [facility] or c. She has pcp and \" lung Dr\" but she is uncertain of names. He will  at discharge.  Will follow for transition of care needs    Electronically signed by Radha De La O RN on 9/20/2023 at 1:52 PM

## 2023-09-20 NOTE — PLAN OF CARE
Problem: Respiratory - Adult  Goal: Achieves optimal ventilation and oxygenation  9/20/2023 0808 by Brendon Caal RN  Outcome: Progressing     Problem: ABCDS Injury Assessment  Goal: Absence of physical injury  9/20/2023 0808 by Brendon Caal RN  Outcome: Progressing     Problem: Safety - Adult  Goal: Free from fall injury  9/20/2023 0808 by Brendon Caal RN  Outcome: Progressing     Problem: Skin/Tissue Integrity  Goal: Absence of new skin breakdown  Description: 1. Monitor for areas of redness and/or skin breakdown  2. Assess vascular access sites hourly  3. Every 4-6 hours minimum:  Change oxygen saturation probe site  4. Every 4-6 hours:  If on nasal continuous positive airway pressure, respiratory therapy assess nares and determine need for appliance change or resting period. 9/20/2023 9203 by Brendon Caal RN  Outcome: Progressing     Problem: Pain  Goal: Verbalizes/displays adequate comfort level or baseline comfort level  9/20/2023 0808 by Brendon Caal RN  Outcome: Progressing   Plan of care discussed with patient / family.

## 2023-09-21 ENCOUNTER — APPOINTMENT (OUTPATIENT)
Dept: GENERAL RADIOLOGY | Age: 58
DRG: 189 | End: 2023-09-21
Payer: COMMERCIAL

## 2023-09-21 LAB
AADO2: 133.8 MMHG
AADO2: 226.6 MMHG
ALBUMIN SERPL-MCNC: 3.4 G/DL (ref 3.5–5.2)
ALP SERPL-CCNC: 101 U/L (ref 35–104)
ALT SERPL-CCNC: 15 U/L (ref 0–32)
ANION GAP SERPL CALCULATED.3IONS-SCNC: 8 MMOL/L (ref 7–16)
AST SERPL-CCNC: 17 U/L (ref 0–31)
B.E.: -0.1 MMOL/L (ref -3–3)
B.E.: 2.8 MMOL/L (ref -3–3)
BASOPHILS # BLD: 0 K/UL (ref 0–0.2)
BASOPHILS NFR BLD: 0 % (ref 0–2)
BILIRUB SERPL-MCNC: 0.2 MG/DL (ref 0–1.2)
BUN SERPL-MCNC: 35 MG/DL (ref 6–20)
CALCIUM SERPL-MCNC: 9.1 MG/DL (ref 8.6–10.2)
CHLORIDE SERPL-SCNC: 102 MMOL/L (ref 98–107)
CO2 SERPL-SCNC: 30 MMOL/L (ref 22–29)
COHB: 0.8 % (ref 0–1.5)
COHB: 1.2 % (ref 0–1.5)
CREAT SERPL-MCNC: 1.2 MG/DL (ref 0.5–1)
CRITICAL: ABNORMAL
CRITICAL: ABNORMAL
DATE ANALYZED: ABNORMAL
DATE ANALYZED: ABNORMAL
DATE OF COLLECTION: ABNORMAL
DATE OF COLLECTION: ABNORMAL
EOSINOPHIL # BLD: 0 K/UL (ref 0.05–0.5)
EOSINOPHILS RELATIVE PERCENT: 0 % (ref 0–6)
EPO SERPL-ACNC: 18 MU/ML (ref 4–27)
ERYTHROCYTE [DISTWIDTH] IN BLOOD BY AUTOMATED COUNT: 15.2 % (ref 11.5–15)
FIO2: 40 %
FIO2: 75 %
GFR SERPL CREATININE-BSD FRML MDRD: 52 ML/MIN/1.73M2
GLUCOSE BLD-MCNC: 101 MG/DL (ref 74–99)
GLUCOSE BLD-MCNC: 111 MG/DL (ref 74–99)
GLUCOSE BLD-MCNC: 122 MG/DL (ref 74–99)
GLUCOSE BLD-MCNC: 126 MG/DL (ref 74–99)
GLUCOSE SERPL-MCNC: 107 MG/DL (ref 74–99)
HCO3: 29.4 MMOL/L (ref 22–26)
HCO3: 30.8 MMOL/L (ref 22–26)
HCT VFR BLD AUTO: 46.6 % (ref 34–48)
HGB BLD-MCNC: 14.6 G/DL (ref 11.5–15.5)
HHB: 0.7 % (ref 0–5)
HHB: 11 % (ref 0–5)
IMM GRANULOCYTES # BLD AUTO: <0.03 K/UL (ref 0–0.58)
IMM GRANULOCYTES NFR BLD: 0 % (ref 0–5)
LAB: ABNORMAL
LAB: ABNORMAL
LYMPHOCYTES NFR BLD: 1.15 K/UL (ref 1.5–4)
LYMPHOCYTES RELATIVE PERCENT: 15 % (ref 20–42)
Lab: 1737
Lab: 525
MCH RBC QN AUTO: 33 PG (ref 26–35)
MCHC RBC AUTO-ENTMCNC: 31.3 G/DL (ref 32–34.5)
MCV RBC AUTO: 105.4 FL (ref 80–99.9)
METHB: 0.2 % (ref 0–1.5)
METHB: 0.5 % (ref 0–1.5)
MODE: ABNORMAL
MODE: ABNORMAL
MONOCYTES NFR BLD: 0.44 K/UL (ref 0.1–0.95)
MONOCYTES NFR BLD: 6 % (ref 2–12)
NEUTROPHILS NFR BLD: 79 % (ref 43–80)
NEUTS SEG NFR BLD: 5.93 K/UL (ref 1.8–7.3)
O2 CONTENT: 19.8 ML/DL
O2 CONTENT: 21.3 ML/DL
O2 SATURATION: 88.8 % (ref 92–98.5)
O2 SATURATION: 99.3 % (ref 92–98.5)
O2HB: 87.6 % (ref 94–97)
O2HB: 98 % (ref 94–97)
OPERATOR ID: 1768
OPERATOR ID: 914
PATIENT TEMP: 37 C
PATIENT TEMP: 37 C
PCO2: 61.7 MMHG (ref 35–45)
PCO2: 69.6 MMHG (ref 35–45)
PEEP/CPAP: 5 CMH2O
PFO2: 1.54 MMHG/%
PFO2: 2.99 MMHG/%
PH BLOOD GAS: 7.24 (ref 7.35–7.45)
PH BLOOD GAS: 7.32 (ref 7.35–7.45)
PLATELET # BLD AUTO: 206 K/UL (ref 130–450)
PMV BLD AUTO: 10.2 FL (ref 7–12)
PO2: 223.9 MMHG (ref 75–100)
PO2: 61.4 MMHG (ref 75–100)
POTASSIUM SERPL-SCNC: 4.4 MMOL/L (ref 3.5–5)
PROT SERPL-MCNC: 6.8 G/DL (ref 6.4–8.3)
RBC # BLD AUTO: 4.42 M/UL (ref 3.5–5.5)
RI(T): 1.01
RI(T): 2.18
RR MECHANICAL: 14 B/MIN
SODIUM SERPL-SCNC: 140 MMOL/L (ref 132–146)
SOURCE, BLOOD GAS: ABNORMAL
SOURCE, BLOOD GAS: ABNORMAL
THB: 15.1 G/DL (ref 11.5–16.5)
THB: 16.1 G/DL (ref 11.5–16.5)
TIME ANALYZED: 1739
TIME ANALYZED: 536
VT MECHANICAL: 450 ML
WBC OTHER # BLD: 7.5 K/UL (ref 4.5–11.5)

## 2023-09-21 PROCEDURE — 6360000002 HC RX W HCPCS: Performed by: STUDENT IN AN ORGANIZED HEALTH CARE EDUCATION/TRAINING PROGRAM

## 2023-09-21 PROCEDURE — 85025 COMPLETE CBC W/AUTO DIFF WBC: CPT

## 2023-09-21 PROCEDURE — 99233 SBSQ HOSP IP/OBS HIGH 50: CPT | Performed by: INTERNAL MEDICINE

## 2023-09-21 PROCEDURE — 6360000002 HC RX W HCPCS: Performed by: CHIROPRACTOR

## 2023-09-21 PROCEDURE — 2500000003 HC RX 250 WO HCPCS: Performed by: STUDENT IN AN ORGANIZED HEALTH CARE EDUCATION/TRAINING PROGRAM

## 2023-09-21 PROCEDURE — 6370000000 HC RX 637 (ALT 250 FOR IP): Performed by: STUDENT IN AN ORGANIZED HEALTH CARE EDUCATION/TRAINING PROGRAM

## 2023-09-21 PROCEDURE — 51798 US URINE CAPACITY MEASURE: CPT

## 2023-09-21 PROCEDURE — 82805 BLOOD GASES W/O2 SATURATION: CPT

## 2023-09-21 PROCEDURE — 2580000003 HC RX 258: Performed by: STUDENT IN AN ORGANIZED HEALTH CARE EDUCATION/TRAINING PROGRAM

## 2023-09-21 PROCEDURE — 94660 CPAP INITIATION&MGMT: CPT

## 2023-09-21 PROCEDURE — 94640 AIRWAY INHALATION TREATMENT: CPT

## 2023-09-21 PROCEDURE — 80053 COMPREHEN METABOLIC PANEL: CPT

## 2023-09-21 PROCEDURE — 71045 X-RAY EXAM CHEST 1 VIEW: CPT

## 2023-09-21 PROCEDURE — 82962 GLUCOSE BLOOD TEST: CPT

## 2023-09-21 PROCEDURE — 2580000003 HC RX 258: Performed by: CHIROPRACTOR

## 2023-09-21 PROCEDURE — 2700000000 HC OXYGEN THERAPY PER DAY

## 2023-09-21 PROCEDURE — 2000000000 HC ICU R&B

## 2023-09-21 RX ORDER — SODIUM CHLORIDE, SODIUM LACTATE, POTASSIUM CHLORIDE, AND CALCIUM CHLORIDE .6; .31; .03; .02 G/100ML; G/100ML; G/100ML; G/100ML
500 INJECTION, SOLUTION INTRAVENOUS ONCE
Status: COMPLETED | OUTPATIENT
Start: 2023-09-21 | End: 2023-09-21

## 2023-09-21 RX ADMIN — BUPROPION HYDROCHLORIDE 150 MG: 150 TABLET, EXTENDED RELEASE ORAL at 08:19

## 2023-09-21 RX ADMIN — PANTOPRAZOLE SODIUM 40 MG: 40 TABLET, DELAYED RELEASE ORAL at 05:45

## 2023-09-21 RX ADMIN — DULOXETINE HYDROCHLORIDE 60 MG: 60 CAPSULE, DELAYED RELEASE ORAL at 08:19

## 2023-09-21 RX ADMIN — WATER 1000 MG: 1 INJECTION INTRAMUSCULAR; INTRAVENOUS; SUBCUTANEOUS at 00:30

## 2023-09-21 RX ADMIN — HEPARIN SODIUM 5000 UNITS: 10000 INJECTION INTRAVENOUS; SUBCUTANEOUS at 14:05

## 2023-09-21 RX ADMIN — ROSUVASTATIN CALCIUM 20 MG: 20 TABLET, FILM COATED ORAL at 08:18

## 2023-09-21 RX ADMIN — IPRATROPIUM BROMIDE AND ALBUTEROL SULFATE 1 DOSE: .5; 2.5 SOLUTION RESPIRATORY (INHALATION) at 07:27

## 2023-09-21 RX ADMIN — IPRATROPIUM BROMIDE AND ALBUTEROL SULFATE 1 DOSE: .5; 2.5 SOLUTION RESPIRATORY (INHALATION) at 13:30

## 2023-09-21 RX ADMIN — BUDESONIDE INHALATION 500 MCG: 0.5 SUSPENSION RESPIRATORY (INHALATION) at 20:50

## 2023-09-21 RX ADMIN — DOXYCYCLINE 100 MG: 100 INJECTION, POWDER, LYOPHILIZED, FOR SOLUTION INTRAVENOUS at 00:36

## 2023-09-21 RX ADMIN — IPRATROPIUM BROMIDE AND ALBUTEROL SULFATE 1 DOSE: .5; 2.5 SOLUTION RESPIRATORY (INHALATION) at 17:51

## 2023-09-21 RX ADMIN — IPRATROPIUM BROMIDE AND ALBUTEROL SULFATE 1 DOSE: .5; 2.5 SOLUTION RESPIRATORY (INHALATION) at 20:51

## 2023-09-21 RX ADMIN — DOXYCYCLINE 100 MG: 100 INJECTION, POWDER, LYOPHILIZED, FOR SOLUTION INTRAVENOUS at 12:16

## 2023-09-21 RX ADMIN — DEXTROAMPHETAMINE SULFATE, DEXTROAMPHETAMINE SACCHARATE, AMPHETAMINE SULFATE AND AMPHETAMINE ASPARTATE 30 MG: 2.5; 2.5; 2.5; 2.5 CAPSULE, EXTENDED RELEASE ORAL at 08:18

## 2023-09-21 RX ADMIN — PREDNISONE 40 MG: 20 TABLET ORAL at 08:18

## 2023-09-21 RX ADMIN — Medication 10 ML: at 22:03

## 2023-09-21 RX ADMIN — ARFORMOTEROL TARTRATE 15 MCG: 15 SOLUTION RESPIRATORY (INHALATION) at 07:27

## 2023-09-21 RX ADMIN — SODIUM CHLORIDE, POTASSIUM CHLORIDE, SODIUM LACTATE AND CALCIUM CHLORIDE 500 ML: 600; 310; 30; 20 INJECTION, SOLUTION INTRAVENOUS at 08:31

## 2023-09-21 RX ADMIN — HEPARIN SODIUM 5000 UNITS: 10000 INJECTION INTRAVENOUS; SUBCUTANEOUS at 22:02

## 2023-09-21 RX ADMIN — HEPARIN SODIUM 5000 UNITS: 10000 INJECTION INTRAVENOUS; SUBCUTANEOUS at 05:22

## 2023-09-21 RX ADMIN — DULOXETINE HYDROCHLORIDE 60 MG: 60 CAPSULE, DELAYED RELEASE ORAL at 21:13

## 2023-09-21 RX ADMIN — ALLOPURINOL 300 MG: 300 TABLET ORAL at 08:19

## 2023-09-21 RX ADMIN — BUDESONIDE INHALATION 500 MCG: 0.5 SUSPENSION RESPIRATORY (INHALATION) at 07:27

## 2023-09-21 RX ADMIN — ARFORMOTEROL TARTRATE 15 MCG: 15 SOLUTION RESPIRATORY (INHALATION) at 20:50

## 2023-09-21 ASSESSMENT — PAIN SCALES - GENERAL
PAINLEVEL_OUTOF10: 0
PAINLEVEL_OUTOF10: 0

## 2023-09-21 NOTE — PROGRESS NOTES
CHEST PORTABLE   Final Result   No acute process. XR CHEST PORTABLE   Final Result   Normal chest         XR CHEST PORTABLE   Final Result   No acute disease. RECOMMENDATION:   Careful clinical correlation and follow up recommended. XR CHEST PORTABLE    (Results Pending)          Resident's Assessment and Plan     Assessment and Plan:    Patient presented with shortness of breath, COVID and a cough with difficulty expectorating sputum for 4 days. SaO2 62% on room air. Initial assessment moderate respectively distress NRB mask then switched to BiPAP.   Patient is a active smoker, 1 pack/day for 15 years, last smoked 9/16    Neurology:   Hx of migraine headache, stable  On Rimegepant sulfate at home    Cardiology:   Hypertension  On Losartan-HCTZ 100-25mg OD  Held for now    Hyperlipidemia  On rosuvastatin 20 mg OD at home    Pulmonary:   COPD in acute exacerbation  On Trelegy ellipta (Fluticasone-umeclidin-vilant) 1 puff OD at home  Present with onset of not reproductive cough and nasal congestion for 4 days with progressive shortness of breath, not relieved with Trelegy inhaler, denied increase in sputum, denies fever  Respiratory panel positive for rhinovirus  Respiratory culture showed few mixed bacterial morphotypes, otherwise normal  Strep pneumo antigen negative, Legionella urine antigen negative  On Rocephin 1000mg, doxycycline 100mg BID  Plan  DC doxy, continue Rocephin   Started on pulmonary hygiene, Brovana, Pulmicort, DuoNeb  Solu-Medrol treatment completed   On prednisone 40 mg for 3 days, last dose on 9/22/23    Concerns for PE-Low suspicion  Macomb score for PE: 3  Patient received 105 mg enoxaparin (PE treatment dose)  Patient unable to tolerate lying flat for CTA chest  US dup lower extremities no DVT  On heparin for DVT prophylaxis    Hematology/ Oncology:   Polycythemia  Hb 17.4>15.7>14.8  Erythropoietin level pending    Nephrology:  JARED stage I   Cr 0.9-1.2, GFR >60 - Rhinovirus with continued bronchospasm.      Pavel Pierre DO, FACP, FCCP, [place],

## 2023-09-21 NOTE — CONSULTS
Patient is not known to group and has been followed by Dr. Ana Paula Araujo for acute resp failure for 2 days so will leave patient to his care. Also has been referred as OP to Dr. Rocael costello.

## 2023-09-21 NOTE — PROGRESS NOTES
09/21/23 0745   NIV Type   NIV Started/Stopped On   Equipment Type v60   Mode AVAPS   Mask Type Full face mask   Mask Size Medium   Assessment   Pulse 84   Respirations 21   /89   SpO2 100 %   Comfort Level Good   Using Accessory Muscles No   Mask Compliance Good   Skin Protection for O2 Device Yes   Orientation Middle   Location Nose   Intervention(s) Skin Barrier   Settings/Measurements   PIP Observed 22 cm H20   CPAP/EPAP 5 cmH2O   IPAP Min 20 cmH2O   IPAP Max 30 cmH2O   Vt (Set, mL) 450 mL   Vt (Measured) 438 mL   FiO2  40 %   I Time/ I Time % 0.8 s   Minute Volume (L/min) 11.8 Liters   Mask Leak (lpm) 27 lpm   Patient's Home Machine No   Alarm Settings   Alarms On Y

## 2023-09-22 ENCOUNTER — APPOINTMENT (OUTPATIENT)
Dept: GENERAL RADIOLOGY | Age: 58
DRG: 189 | End: 2023-09-22
Payer: COMMERCIAL

## 2023-09-22 LAB
ALBUMIN SERPL-MCNC: 3.4 G/DL (ref 3.5–5.2)
ALP SERPL-CCNC: 90 U/L (ref 35–104)
ALT SERPL-CCNC: 18 U/L (ref 0–32)
ANION GAP SERPL CALCULATED.3IONS-SCNC: 8 MMOL/L (ref 7–16)
AST SERPL-CCNC: 18 U/L (ref 0–31)
B.E.: 1.9 MMOL/L (ref -3–3)
BASOPHILS # BLD: 0.01 K/UL (ref 0–0.2)
BASOPHILS NFR BLD: 0 % (ref 0–2)
BILIRUB SERPL-MCNC: 0.2 MG/DL (ref 0–1.2)
BUN SERPL-MCNC: 30 MG/DL (ref 6–20)
CALCIUM SERPL-MCNC: 8.7 MG/DL (ref 8.6–10.2)
CHLORIDE SERPL-SCNC: 107 MMOL/L (ref 98–107)
CO2 SERPL-SCNC: 29 MMOL/L (ref 22–29)
COHB: 0.9 % (ref 0–1.5)
CREAT SERPL-MCNC: 1 MG/DL (ref 0.5–1)
CRITICAL: ABNORMAL
DATE ANALYZED: ABNORMAL
DATE OF COLLECTION: ABNORMAL
EOSINOPHIL # BLD: 0.01 K/UL (ref 0.05–0.5)
EOSINOPHILS RELATIVE PERCENT: 0 % (ref 0–6)
ERYTHROCYTE [DISTWIDTH] IN BLOOD BY AUTOMATED COUNT: 15.1 % (ref 11.5–15)
GFR SERPL CREATININE-BSD FRML MDRD: >60 ML/MIN/1.73M2
GLUCOSE BLD-MCNC: 109 MG/DL (ref 74–99)
GLUCOSE BLD-MCNC: 119 MG/DL (ref 74–99)
GLUCOSE BLD-MCNC: 128 MG/DL (ref 74–99)
GLUCOSE BLD-MCNC: 78 MG/DL (ref 74–99)
GLUCOSE SERPL-MCNC: 82 MG/DL (ref 74–99)
HCO3: 30.5 MMOL/L (ref 22–26)
HCT VFR BLD AUTO: 47.2 % (ref 34–48)
HGB BLD-MCNC: 14.8 G/DL (ref 11.5–15.5)
HHB: 1.2 % (ref 0–5)
IMM GRANULOCYTES # BLD AUTO: <0.03 K/UL (ref 0–0.58)
IMM GRANULOCYTES NFR BLD: 0 % (ref 0–5)
LAB: ABNORMAL
LYMPHOCYTES NFR BLD: 1.81 K/UL (ref 1.5–4)
LYMPHOCYTES RELATIVE PERCENT: 33 % (ref 20–42)
Lab: 1739
MCH RBC QN AUTO: 32.8 PG (ref 26–35)
MCHC RBC AUTO-ENTMCNC: 31.4 G/DL (ref 32–34.5)
MCV RBC AUTO: 104.7 FL (ref 80–99.9)
METHB: 0.4 % (ref 0–1.5)
MODE: ABNORMAL
MONOCYTES NFR BLD: 0.39 K/UL (ref 0.1–0.95)
MONOCYTES NFR BLD: 7 % (ref 2–12)
NEUTROPHILS NFR BLD: 59 % (ref 43–80)
NEUTS SEG NFR BLD: 3.23 K/UL (ref 1.8–7.3)
O2 CONTENT: 21.1 ML/DL
O2 SATURATION: 98.8 % (ref 92–98.5)
O2HB: 97.5 % (ref 94–97)
OPERATOR ID: 1210
PATIENT TEMP: 37 C
PCO2: 65 MMHG (ref 35–45)
PH BLOOD GAS: 7.29 (ref 7.35–7.45)
PH VENOUS: 7.29 (ref 7.35–7.45)
PLATELET # BLD AUTO: 192 K/UL (ref 130–450)
PMV BLD AUTO: 9.8 FL (ref 7–12)
PO2: 146.3 MMHG (ref 75–100)
POTASSIUM SERPL-SCNC: 4 MMOL/L (ref 3.5–5)
PROT SERPL-MCNC: 6.4 G/DL (ref 6.4–8.3)
RBC # BLD AUTO: 4.51 M/UL (ref 3.5–5.5)
SODIUM SERPL-SCNC: 144 MMOL/L (ref 132–146)
SOURCE, BLOOD GAS: ABNORMAL
THB: 15.2 G/DL (ref 11.5–16.5)
TIME ANALYZED: 1747
WBC OTHER # BLD: 5.5 K/UL (ref 4.5–11.5)

## 2023-09-22 PROCEDURE — 85025 COMPLETE CBC W/AUTO DIFF WBC: CPT

## 2023-09-22 PROCEDURE — 82962 GLUCOSE BLOOD TEST: CPT

## 2023-09-22 PROCEDURE — 80053 COMPREHEN METABOLIC PANEL: CPT

## 2023-09-22 PROCEDURE — 6370000000 HC RX 637 (ALT 250 FOR IP): Performed by: STUDENT IN AN ORGANIZED HEALTH CARE EDUCATION/TRAINING PROGRAM

## 2023-09-22 PROCEDURE — 6360000002 HC RX W HCPCS: Performed by: CHIROPRACTOR

## 2023-09-22 PROCEDURE — 82800 BLOOD PH: CPT

## 2023-09-22 PROCEDURE — 6360000002 HC RX W HCPCS: Performed by: STUDENT IN AN ORGANIZED HEALTH CARE EDUCATION/TRAINING PROGRAM

## 2023-09-22 PROCEDURE — 82805 BLOOD GASES W/O2 SATURATION: CPT

## 2023-09-22 PROCEDURE — 94640 AIRWAY INHALATION TREATMENT: CPT

## 2023-09-22 PROCEDURE — 71045 X-RAY EXAM CHEST 1 VIEW: CPT

## 2023-09-22 PROCEDURE — 99233 SBSQ HOSP IP/OBS HIGH 50: CPT | Performed by: INTERNAL MEDICINE

## 2023-09-22 PROCEDURE — 2700000000 HC OXYGEN THERAPY PER DAY

## 2023-09-22 PROCEDURE — 94660 CPAP INITIATION&MGMT: CPT

## 2023-09-22 PROCEDURE — 2580000003 HC RX 258: Performed by: STUDENT IN AN ORGANIZED HEALTH CARE EDUCATION/TRAINING PROGRAM

## 2023-09-22 PROCEDURE — 2000000000 HC ICU R&B

## 2023-09-22 RX ORDER — PREDNISONE 20 MG/1
40 TABLET ORAL DAILY
Status: COMPLETED | OUTPATIENT
Start: 2023-09-23 | End: 2023-09-25

## 2023-09-22 RX ADMIN — IPRATROPIUM BROMIDE AND ALBUTEROL SULFATE 1 DOSE: .5; 2.5 SOLUTION RESPIRATORY (INHALATION) at 21:00

## 2023-09-22 RX ADMIN — ARFORMOTEROL TARTRATE 15 MCG: 15 SOLUTION RESPIRATORY (INHALATION) at 08:59

## 2023-09-22 RX ADMIN — ALLOPURINOL 300 MG: 300 TABLET ORAL at 09:56

## 2023-09-22 RX ADMIN — IPRATROPIUM BROMIDE AND ALBUTEROL SULFATE 1 DOSE: .5; 2.5 SOLUTION RESPIRATORY (INHALATION) at 17:10

## 2023-09-22 RX ADMIN — DULOXETINE HYDROCHLORIDE 60 MG: 60 CAPSULE, DELAYED RELEASE ORAL at 21:20

## 2023-09-22 RX ADMIN — DULOXETINE HYDROCHLORIDE 60 MG: 60 CAPSULE, DELAYED RELEASE ORAL at 09:57

## 2023-09-22 RX ADMIN — BUDESONIDE INHALATION 500 MCG: 0.5 SUSPENSION RESPIRATORY (INHALATION) at 08:59

## 2023-09-22 RX ADMIN — HEPARIN SODIUM 5000 UNITS: 10000 INJECTION INTRAVENOUS; SUBCUTANEOUS at 22:14

## 2023-09-22 RX ADMIN — PANTOPRAZOLE SODIUM 40 MG: 40 TABLET, DELAYED RELEASE ORAL at 06:50

## 2023-09-22 RX ADMIN — HEPARIN SODIUM 5000 UNITS: 10000 INJECTION INTRAVENOUS; SUBCUTANEOUS at 14:45

## 2023-09-22 RX ADMIN — BUDESONIDE INHALATION 500 MCG: 0.5 SUSPENSION RESPIRATORY (INHALATION) at 21:00

## 2023-09-22 RX ADMIN — Medication 10 ML: at 09:57

## 2023-09-22 RX ADMIN — DEXTROAMPHETAMINE SULFATE, DEXTROAMPHETAMINE SACCHARATE, AMPHETAMINE SULFATE AND AMPHETAMINE ASPARTATE 30 MG: 2.5; 2.5; 2.5; 2.5 CAPSULE, EXTENDED RELEASE ORAL at 09:56

## 2023-09-22 RX ADMIN — BUPROPION HYDROCHLORIDE 150 MG: 150 TABLET, EXTENDED RELEASE ORAL at 09:57

## 2023-09-22 RX ADMIN — Medication 10 ML: at 21:21

## 2023-09-22 RX ADMIN — ARFORMOTEROL TARTRATE 15 MCG: 15 SOLUTION RESPIRATORY (INHALATION) at 21:00

## 2023-09-22 RX ADMIN — HEPARIN SODIUM 5000 UNITS: 10000 INJECTION INTRAVENOUS; SUBCUTANEOUS at 06:02

## 2023-09-22 RX ADMIN — IPRATROPIUM BROMIDE AND ALBUTEROL SULFATE 1 DOSE: .5; 2.5 SOLUTION RESPIRATORY (INHALATION) at 08:59

## 2023-09-22 RX ADMIN — WATER 1000 MG: 1 INJECTION INTRAMUSCULAR; INTRAVENOUS; SUBCUTANEOUS at 23:46

## 2023-09-22 RX ADMIN — PREDNISONE 40 MG: 20 TABLET ORAL at 09:56

## 2023-09-22 RX ADMIN — WATER 1000 MG: 1 INJECTION INTRAMUSCULAR; INTRAVENOUS; SUBCUTANEOUS at 00:01

## 2023-09-22 RX ADMIN — IPRATROPIUM BROMIDE AND ALBUTEROL SULFATE 1 DOSE: .5; 2.5 SOLUTION RESPIRATORY (INHALATION) at 13:18

## 2023-09-22 RX ADMIN — SODIUM CHLORIDE, PRESERVATIVE FREE 10 ML: 5 INJECTION INTRAVENOUS at 23:47

## 2023-09-22 RX ADMIN — ROSUVASTATIN CALCIUM 20 MG: 20 TABLET, FILM COATED ORAL at 09:57

## 2023-09-22 NOTE — PROGRESS NOTES
Date: 9/21/2023    Time: 10:20 PM    Patient Placed On BIPAP/CPAP/ Non-Invasive Ventilation? Yes    If no must comment. Facial area red/color change? No           If YES are Blister/Lesion present? No   If yes must notify nursing staff  BIPAP/CPAP skin barrier?   Yes    Skin barrier type:mepilexlite       Comments:        Suly Petit RCP

## 2023-09-23 ENCOUNTER — APPOINTMENT (OUTPATIENT)
Dept: GENERAL RADIOLOGY | Age: 58
DRG: 189 | End: 2023-09-23
Payer: COMMERCIAL

## 2023-09-23 LAB
ALBUMIN SERPL-MCNC: 3.6 G/DL (ref 3.5–5.2)
ALP SERPL-CCNC: 92 U/L (ref 35–104)
ALT SERPL-CCNC: 23 U/L (ref 0–32)
ANION GAP SERPL CALCULATED.3IONS-SCNC: 4 MMOL/L (ref 7–16)
AST SERPL-CCNC: 16 U/L (ref 0–31)
BASOPHILS # BLD: 0.01 K/UL (ref 0–0.2)
BASOPHILS NFR BLD: 0 % (ref 0–2)
BILIRUB SERPL-MCNC: 0.2 MG/DL (ref 0–1.2)
BUN SERPL-MCNC: 26 MG/DL (ref 6–20)
CALCIUM SERPL-MCNC: 9.2 MG/DL (ref 8.6–10.2)
CHLORIDE SERPL-SCNC: 101 MMOL/L (ref 98–107)
CO2 SERPL-SCNC: 35 MMOL/L (ref 22–29)
CREAT SERPL-MCNC: 1 MG/DL (ref 0.5–1)
EOSINOPHIL # BLD: 0.07 K/UL (ref 0.05–0.5)
EOSINOPHILS RELATIVE PERCENT: 1 % (ref 0–6)
ERYTHROCYTE [DISTWIDTH] IN BLOOD BY AUTOMATED COUNT: 14.7 % (ref 11.5–15)
GFR SERPL CREATININE-BSD FRML MDRD: >60 ML/MIN/1.73M2
GLUCOSE BLD-MCNC: 104 MG/DL (ref 74–99)
GLUCOSE BLD-MCNC: 134 MG/DL (ref 74–99)
GLUCOSE BLD-MCNC: 148 MG/DL (ref 74–99)
GLUCOSE SERPL-MCNC: 94 MG/DL (ref 74–99)
HCT VFR BLD AUTO: 46.1 % (ref 34–48)
HGB BLD-MCNC: 14.3 G/DL (ref 11.5–15.5)
IMM GRANULOCYTES # BLD AUTO: 0.05 K/UL (ref 0–0.58)
IMM GRANULOCYTES NFR BLD: 1 % (ref 0–5)
LYMPHOCYTES NFR BLD: 1.99 K/UL (ref 1.5–4)
LYMPHOCYTES RELATIVE PERCENT: 33 % (ref 20–42)
MCH RBC QN AUTO: 32.7 PG (ref 26–35)
MCHC RBC AUTO-ENTMCNC: 31 G/DL (ref 32–34.5)
MCV RBC AUTO: 105.5 FL (ref 80–99.9)
MONOCYTES NFR BLD: 0.43 K/UL (ref 0.1–0.95)
MONOCYTES NFR BLD: 7 % (ref 2–12)
NEUTROPHILS NFR BLD: 58 % (ref 43–80)
NEUTS SEG NFR BLD: 3.57 K/UL (ref 1.8–7.3)
PLATELET # BLD AUTO: 173 K/UL (ref 130–450)
PMV BLD AUTO: 9.8 FL (ref 7–12)
POTASSIUM SERPL-SCNC: 3.9 MMOL/L (ref 3.5–5)
PROT SERPL-MCNC: 6.4 G/DL (ref 6.4–8.3)
RBC # BLD AUTO: 4.37 M/UL (ref 3.5–5.5)
SODIUM SERPL-SCNC: 140 MMOL/L (ref 132–146)
WBC OTHER # BLD: 6.1 K/UL (ref 4.5–11.5)

## 2023-09-23 PROCEDURE — 82800 BLOOD PH: CPT

## 2023-09-23 PROCEDURE — 2580000003 HC RX 258: Performed by: STUDENT IN AN ORGANIZED HEALTH CARE EDUCATION/TRAINING PROGRAM

## 2023-09-23 PROCEDURE — 80053 COMPREHEN METABOLIC PANEL: CPT

## 2023-09-23 PROCEDURE — 6370000000 HC RX 637 (ALT 250 FOR IP): Performed by: STUDENT IN AN ORGANIZED HEALTH CARE EDUCATION/TRAINING PROGRAM

## 2023-09-23 PROCEDURE — 94660 CPAP INITIATION&MGMT: CPT

## 2023-09-23 PROCEDURE — 6370000000 HC RX 637 (ALT 250 FOR IP): Performed by: CHIROPRACTOR

## 2023-09-23 PROCEDURE — 71045 X-RAY EXAM CHEST 1 VIEW: CPT

## 2023-09-23 PROCEDURE — 82962 GLUCOSE BLOOD TEST: CPT

## 2023-09-23 PROCEDURE — 94640 AIRWAY INHALATION TREATMENT: CPT

## 2023-09-23 PROCEDURE — 2700000000 HC OXYGEN THERAPY PER DAY

## 2023-09-23 PROCEDURE — 2000000000 HC ICU R&B

## 2023-09-23 PROCEDURE — 6360000002 HC RX W HCPCS: Performed by: STUDENT IN AN ORGANIZED HEALTH CARE EDUCATION/TRAINING PROGRAM

## 2023-09-23 PROCEDURE — 99232 SBSQ HOSP IP/OBS MODERATE 35: CPT | Performed by: INTERNAL MEDICINE

## 2023-09-23 PROCEDURE — 6360000002 HC RX W HCPCS: Performed by: CHIROPRACTOR

## 2023-09-23 PROCEDURE — 85025 COMPLETE CBC W/AUTO DIFF WBC: CPT

## 2023-09-23 RX ADMIN — HEPARIN SODIUM 5000 UNITS: 10000 INJECTION INTRAVENOUS; SUBCUTANEOUS at 21:32

## 2023-09-23 RX ADMIN — DULOXETINE HYDROCHLORIDE 60 MG: 60 CAPSULE, DELAYED RELEASE ORAL at 09:39

## 2023-09-23 RX ADMIN — DEXTROAMPHETAMINE SULFATE, DEXTROAMPHETAMINE SACCHARATE, AMPHETAMINE SULFATE AND AMPHETAMINE ASPARTATE 30 MG: 2.5; 2.5; 2.5; 2.5 CAPSULE, EXTENDED RELEASE ORAL at 09:38

## 2023-09-23 RX ADMIN — DULOXETINE HYDROCHLORIDE 60 MG: 60 CAPSULE, DELAYED RELEASE ORAL at 21:32

## 2023-09-23 RX ADMIN — IPRATROPIUM BROMIDE AND ALBUTEROL SULFATE 1 DOSE: .5; 2.5 SOLUTION RESPIRATORY (INHALATION) at 21:13

## 2023-09-23 RX ADMIN — ACETAMINOPHEN 650 MG: 325 TABLET ORAL at 22:03

## 2023-09-23 RX ADMIN — IPRATROPIUM BROMIDE AND ALBUTEROL SULFATE 1 DOSE: .5; 2.5 SOLUTION RESPIRATORY (INHALATION) at 17:08

## 2023-09-23 RX ADMIN — BUPROPION HYDROCHLORIDE 150 MG: 150 TABLET, EXTENDED RELEASE ORAL at 09:39

## 2023-09-23 RX ADMIN — BUDESONIDE INHALATION 500 MCG: 0.5 SUSPENSION RESPIRATORY (INHALATION) at 08:32

## 2023-09-23 RX ADMIN — ALLOPURINOL 300 MG: 300 TABLET ORAL at 09:39

## 2023-09-23 RX ADMIN — ARFORMOTEROL TARTRATE 15 MCG: 15 SOLUTION RESPIRATORY (INHALATION) at 21:13

## 2023-09-23 RX ADMIN — ARFORMOTEROL TARTRATE 15 MCG: 15 SOLUTION RESPIRATORY (INHALATION) at 08:32

## 2023-09-23 RX ADMIN — HEPARIN SODIUM 5000 UNITS: 10000 INJECTION INTRAVENOUS; SUBCUTANEOUS at 15:11

## 2023-09-23 RX ADMIN — PANTOPRAZOLE SODIUM 40 MG: 40 TABLET, DELAYED RELEASE ORAL at 09:42

## 2023-09-23 RX ADMIN — ROSUVASTATIN CALCIUM 20 MG: 20 TABLET, FILM COATED ORAL at 09:38

## 2023-09-23 RX ADMIN — BUDESONIDE INHALATION 500 MCG: 0.5 SUSPENSION RESPIRATORY (INHALATION) at 21:13

## 2023-09-23 RX ADMIN — HEPARIN SODIUM 5000 UNITS: 10000 INJECTION INTRAVENOUS; SUBCUTANEOUS at 06:05

## 2023-09-23 RX ADMIN — IPRATROPIUM BROMIDE AND ALBUTEROL SULFATE 1 DOSE: .5; 2.5 SOLUTION RESPIRATORY (INHALATION) at 08:33

## 2023-09-23 RX ADMIN — IPRATROPIUM BROMIDE AND ALBUTEROL SULFATE 1 DOSE: .5; 2.5 SOLUTION RESPIRATORY (INHALATION) at 11:22

## 2023-09-23 RX ADMIN — Medication 10 ML: at 22:03

## 2023-09-23 RX ADMIN — PREDNISONE 40 MG: 20 TABLET ORAL at 09:38

## 2023-09-23 RX ADMIN — Medication 10 ML: at 09:38

## 2023-09-23 ASSESSMENT — PAIN DESCRIPTION - DESCRIPTORS
DESCRIPTORS: ACHING;DISCOMFORT
DESCRIPTORS: ACHING

## 2023-09-23 ASSESSMENT — PAIN SCALES - GENERAL
PAINLEVEL_OUTOF10: 0
PAINLEVEL_OUTOF10: 0
PAINLEVEL_OUTOF10: 7
PAINLEVEL_OUTOF10: 7

## 2023-09-23 ASSESSMENT — PAIN DESCRIPTION - LOCATION
LOCATION: HEAD
LOCATION: HEAD

## 2023-09-23 ASSESSMENT — PAIN DESCRIPTION - FREQUENCY: FREQUENCY: INTERMITTENT

## 2023-09-23 ASSESSMENT — PAIN - FUNCTIONAL ASSESSMENT: PAIN_FUNCTIONAL_ASSESSMENT: ACTIVITIES ARE NOT PREVENTED

## 2023-09-23 ASSESSMENT — PAIN DESCRIPTION - PAIN TYPE: TYPE: ACUTE PAIN

## 2023-09-23 ASSESSMENT — PAIN DESCRIPTION - ONSET: ONSET: ON-GOING

## 2023-09-23 NOTE — PROGRESS NOTES
Date: 9/22/2023    Time: 11:49 PM    Patient Placed On BIPAP/CPAP/ Non-Invasive Ventilation? Yes    If no must comment. Facial area red/color change? No           If YES are Blister/Lesion present? No   If yes must notify nursing staff  BIPAP/CPAP skin barrier?   Yes    Skin barrier type:mepilexlite       Comments:        Kyung Subramanian RCP

## 2023-09-24 ENCOUNTER — APPOINTMENT (OUTPATIENT)
Dept: GENERAL RADIOLOGY | Age: 58
DRG: 189 | End: 2023-09-24
Payer: COMMERCIAL

## 2023-09-24 LAB
AADO2: 92.4 MMHG
ALBUMIN SERPL-MCNC: 3.3 G/DL (ref 3.5–5.2)
ALP SERPL-CCNC: 89 U/L (ref 35–104)
ALT SERPL-CCNC: 21 U/L (ref 0–32)
ANION GAP SERPL CALCULATED.3IONS-SCNC: 8 MMOL/L (ref 7–16)
AST SERPL-CCNC: 20 U/L (ref 0–31)
B.E.: 5.7 MMOL/L (ref -3–3)
BASOPHILS # BLD: 0.01 K/UL (ref 0–0.2)
BASOPHILS NFR BLD: 0 % (ref 0–2)
BILIRUB SERPL-MCNC: 0.2 MG/DL (ref 0–1.2)
BUN SERPL-MCNC: 23 MG/DL (ref 6–20)
CALCIUM SERPL-MCNC: 9.4 MG/DL (ref 8.6–10.2)
CHLORIDE SERPL-SCNC: 101 MMOL/L (ref 98–107)
CO2 SERPL-SCNC: 33 MMOL/L (ref 22–29)
COHB: 1.1 % (ref 0–1.5)
CREAT SERPL-MCNC: 0.8 MG/DL (ref 0.5–1)
CRITICAL: ABNORMAL
DATE ANALYZED: ABNORMAL
DATE OF COLLECTION: ABNORMAL
EOSINOPHIL # BLD: 0.09 K/UL (ref 0.05–0.5)
EOSINOPHILS RELATIVE PERCENT: 1 % (ref 0–6)
ERYTHROCYTE [DISTWIDTH] IN BLOOD BY AUTOMATED COUNT: 14.3 % (ref 11.5–15)
FIO2: 40 %
GFR SERPL CREATININE-BSD FRML MDRD: >60 ML/MIN/1.73M2
GLUCOSE BLD-MCNC: 107 MG/DL (ref 74–99)
GLUCOSE BLD-MCNC: 154 MG/DL (ref 74–99)
GLUCOSE BLD-MCNC: 95 MG/DL (ref 74–99)
GLUCOSE SERPL-MCNC: 101 MG/DL (ref 74–99)
HCO3: 34.1 MMOL/L (ref 22–26)
HCT VFR BLD AUTO: 44.8 % (ref 34–48)
HGB BLD-MCNC: 14.5 G/DL (ref 11.5–15.5)
HHB: 2.2 % (ref 0–5)
IMM GRANULOCYTES # BLD AUTO: 0.06 K/UL (ref 0–0.58)
IMM GRANULOCYTES NFR BLD: 1 % (ref 0–5)
LAB: ABNORMAL
LYMPHOCYTES NFR BLD: 1.72 K/UL (ref 1.5–4)
LYMPHOCYTES RELATIVE PERCENT: 25 % (ref 20–42)
Lab: 600
MCH RBC QN AUTO: 33.2 PG (ref 26–35)
MCHC RBC AUTO-ENTMCNC: 32.4 G/DL (ref 32–34.5)
MCV RBC AUTO: 102.5 FL (ref 80–99.9)
METHB: 0.4 % (ref 0–1.5)
MODE: ABNORMAL
MONOCYTES NFR BLD: 0.45 K/UL (ref 0.1–0.95)
MONOCYTES NFR BLD: 7 % (ref 2–12)
NEUTROPHILS NFR BLD: 66 % (ref 43–80)
NEUTS SEG NFR BLD: 4.49 K/UL (ref 1.8–7.3)
O2 CONTENT: 21.1 ML/DL
O2 SATURATION: 97.8 % (ref 92–98.5)
O2HB: 96.3 % (ref 94–97)
OPERATOR ID: ABNORMAL
PATIENT TEMP: 37 C
PCO2: 66.4 MMHG (ref 35–45)
PEEP/CPAP: 6 CMH2O
PFO2: 2.66 MMHG/%
PH BLOOD GAS: 7.33 (ref 7.35–7.45)
PLATELET # BLD AUTO: 178 K/UL (ref 130–450)
PMV BLD AUTO: 10 FL (ref 7–12)
PO2: 106.4 MMHG (ref 75–100)
POTASSIUM SERPL-SCNC: 4.4 MMOL/L (ref 3.5–5)
PROT SERPL-MCNC: 6.3 G/DL (ref 6.4–8.3)
PS: 12 CMH20
RBC # BLD AUTO: 4.37 M/UL (ref 3.5–5.5)
RI(T): 0.87
SODIUM SERPL-SCNC: 142 MMOL/L (ref 132–146)
SOURCE, BLOOD GAS: ABNORMAL
THB: 15.5 G/DL (ref 11.5–16.5)
TIME ANALYZED: 606
WBC OTHER # BLD: 6.8 K/UL (ref 4.5–11.5)

## 2023-09-24 PROCEDURE — 85025 COMPLETE CBC W/AUTO DIFF WBC: CPT

## 2023-09-24 PROCEDURE — 6370000000 HC RX 637 (ALT 250 FOR IP): Performed by: INTERNAL MEDICINE

## 2023-09-24 PROCEDURE — 94640 AIRWAY INHALATION TREATMENT: CPT

## 2023-09-24 PROCEDURE — 2700000000 HC OXYGEN THERAPY PER DAY

## 2023-09-24 PROCEDURE — 6360000002 HC RX W HCPCS: Performed by: CHIROPRACTOR

## 2023-09-24 PROCEDURE — 2000000000 HC ICU R&B

## 2023-09-24 PROCEDURE — 82962 GLUCOSE BLOOD TEST: CPT

## 2023-09-24 PROCEDURE — 2580000003 HC RX 258: Performed by: STUDENT IN AN ORGANIZED HEALTH CARE EDUCATION/TRAINING PROGRAM

## 2023-09-24 PROCEDURE — 99232 SBSQ HOSP IP/OBS MODERATE 35: CPT | Performed by: INTERNAL MEDICINE

## 2023-09-24 PROCEDURE — 6370000000 HC RX 637 (ALT 250 FOR IP): Performed by: CHIROPRACTOR

## 2023-09-24 PROCEDURE — 6360000002 HC RX W HCPCS: Performed by: STUDENT IN AN ORGANIZED HEALTH CARE EDUCATION/TRAINING PROGRAM

## 2023-09-24 PROCEDURE — 94660 CPAP INITIATION&MGMT: CPT

## 2023-09-24 PROCEDURE — 82805 BLOOD GASES W/O2 SATURATION: CPT

## 2023-09-24 PROCEDURE — 71045 X-RAY EXAM CHEST 1 VIEW: CPT

## 2023-09-24 PROCEDURE — 6370000000 HC RX 637 (ALT 250 FOR IP): Performed by: STUDENT IN AN ORGANIZED HEALTH CARE EDUCATION/TRAINING PROGRAM

## 2023-09-24 PROCEDURE — 80053 COMPREHEN METABOLIC PANEL: CPT

## 2023-09-24 PROCEDURE — 2060000000 HC ICU INTERMEDIATE R&B

## 2023-09-24 RX ORDER — GUAIFENESIN 400 MG/1
400 TABLET ORAL 3 TIMES DAILY
Status: DISCONTINUED | OUTPATIENT
Start: 2023-09-24 | End: 2023-09-28 | Stop reason: HOSPADM

## 2023-09-24 RX ADMIN — ARFORMOTEROL TARTRATE 15 MCG: 15 SOLUTION RESPIRATORY (INHALATION) at 09:28

## 2023-09-24 RX ADMIN — IPRATROPIUM BROMIDE AND ALBUTEROL SULFATE 1 DOSE: .5; 2.5 SOLUTION RESPIRATORY (INHALATION) at 09:28

## 2023-09-24 RX ADMIN — HEPARIN SODIUM 5000 UNITS: 10000 INJECTION INTRAVENOUS; SUBCUTANEOUS at 05:47

## 2023-09-24 RX ADMIN — HEPARIN SODIUM 5000 UNITS: 10000 INJECTION INTRAVENOUS; SUBCUTANEOUS at 14:32

## 2023-09-24 RX ADMIN — DEXTROAMPHETAMINE SULFATE, DEXTROAMPHETAMINE SACCHARATE, AMPHETAMINE SULFATE AND AMPHETAMINE ASPARTATE 30 MG: 2.5; 2.5; 2.5; 2.5 CAPSULE, EXTENDED RELEASE ORAL at 08:37

## 2023-09-24 RX ADMIN — IPRATROPIUM BROMIDE AND ALBUTEROL SULFATE 1 DOSE: .5; 2.5 SOLUTION RESPIRATORY (INHALATION) at 12:02

## 2023-09-24 RX ADMIN — WATER 250 MG: 1 INJECTION INTRAMUSCULAR; INTRAVENOUS; SUBCUTANEOUS at 14:30

## 2023-09-24 RX ADMIN — Medication 10 ML: at 21:51

## 2023-09-24 RX ADMIN — IPRATROPIUM BROMIDE AND ALBUTEROL SULFATE 1 DOSE: .5; 2.5 SOLUTION RESPIRATORY (INHALATION) at 20:04

## 2023-09-24 RX ADMIN — ALLOPURINOL 300 MG: 300 TABLET ORAL at 08:37

## 2023-09-24 RX ADMIN — Medication 10 ML: at 08:38

## 2023-09-24 RX ADMIN — ROSUVASTATIN CALCIUM 20 MG: 20 TABLET, FILM COATED ORAL at 08:38

## 2023-09-24 RX ADMIN — ARFORMOTEROL TARTRATE 15 MCG: 15 SOLUTION RESPIRATORY (INHALATION) at 20:03

## 2023-09-24 RX ADMIN — GUAIFENESIN 400 MG: 400 TABLET ORAL at 15:20

## 2023-09-24 RX ADMIN — BUDESONIDE INHALATION 500 MCG: 0.5 SUSPENSION RESPIRATORY (INHALATION) at 09:28

## 2023-09-24 RX ADMIN — DULOXETINE HYDROCHLORIDE 60 MG: 60 CAPSULE, DELAYED RELEASE ORAL at 08:38

## 2023-09-24 RX ADMIN — PREDNISONE 40 MG: 20 TABLET ORAL at 08:37

## 2023-09-24 RX ADMIN — DULOXETINE HYDROCHLORIDE 60 MG: 60 CAPSULE, DELAYED RELEASE ORAL at 21:51

## 2023-09-24 RX ADMIN — GUAIFENESIN 400 MG: 400 TABLET ORAL at 21:51

## 2023-09-24 RX ADMIN — PANTOPRAZOLE SODIUM 40 MG: 40 TABLET, DELAYED RELEASE ORAL at 08:37

## 2023-09-24 RX ADMIN — BUPROPION HYDROCHLORIDE 150 MG: 150 TABLET, EXTENDED RELEASE ORAL at 08:37

## 2023-09-24 RX ADMIN — BUDESONIDE INHALATION 500 MCG: 0.5 SUSPENSION RESPIRATORY (INHALATION) at 20:04

## 2023-09-24 RX ADMIN — HEPARIN SODIUM 5000 UNITS: 10000 INJECTION INTRAVENOUS; SUBCUTANEOUS at 21:51

## 2023-09-24 RX ADMIN — IPRATROPIUM BROMIDE AND ALBUTEROL SULFATE 1 DOSE: .5; 2.5 SOLUTION RESPIRATORY (INHALATION) at 16:27

## 2023-09-24 ASSESSMENT — PAIN SCALES - GENERAL
PAINLEVEL_OUTOF10: 0

## 2023-09-24 NOTE — PLAN OF CARE
Problem: Respiratory - Adult  Goal: Achieves optimal ventilation and oxygenation  2023 by Quentin Hui RN  Outcome: Progressing  2023 by Alesia Mercado RN  Outcome: Progressing     Problem: ABCDS Injury Assessment  Goal: Absence of physical injury  2023 by Quentin Hui RN  Outcome: Progressing  2023 by Alesia Mercado RN  Outcome: Progressing     Problem: Safety - Adult  Goal: Free from fall injury  2023 by Quentin Hui RN  Outcome: Progressing  2023 by Alesia Mercado RN  Outcome: Progressing     Problem: Safety - Adult  Goal: Free from fall injury  2023 by Quentin Hui RN  Outcome: Progressing  2023 by Alesia Mercado RN  Outcome: Progressing     Problem: Skin/Tissue Integrity  Goal: Absence of new skin breakdown  Description: 1. Monitor for areas of redness and/or skin breakdown  2. Assess vascular access sites hourly  3. Every 4-6 hours minimum:  Change oxygen saturation probe site  4. Every 4-6 hours:  If on nasal continuous positive airway pressure, respiratory therapy assess nares and determine need for appliance change or resting period.   2023 by Quentin Hui RN  Outcome: Progressing  2023 by Alesia Mercado RN  Outcome: Progressing     Problem: Pain  Goal: Verbalizes/displays adequate comfort level or baseline comfort level  2023 by Quentin Hui RN  Outcome: Progressing  2023 by Alesia Mercado RN  Outcome: Progressing

## 2023-09-24 NOTE — PLAN OF CARE
Problem: Discharge Planning  Goal: Discharge to home or other facility with appropriate resources  Outcome: Progressing     Problem: Respiratory - Adult  Goal: Achieves optimal ventilation and oxygenation  Outcome: Progressing     Problem: ABCDS Injury Assessment  Goal: Absence of physical injury  Outcome: Progressing     Problem: Safety - Adult  Goal: Free from fall injury  Outcome: Progressing     Problem: Skin/Tissue Integrity  Goal: Absence of new skin breakdown  Description: 1. Monitor for areas of redness and/or skin breakdown  2. Assess vascular access sites hourly  3. Every 4-6 hours minimum:  Change oxygen saturation probe site  4. Every 4-6 hours:  If on nasal continuous positive airway pressure, respiratory therapy assess nares and determine need for appliance change or resting period.   Outcome: Progressing     Problem: Pain  Goal: Verbalizes/displays adequate comfort level or baseline comfort level  Outcome: Progressing

## 2023-09-24 NOTE — PROGRESS NOTES
Date: 9/23/2023    Time: 10:28 PM    Patient Placed On BIPAP/CPAP/ Non-Invasive Ventilation? Yes    If no must comment. Facial area red/color change? No           If YES are Blister/Lesion present? No   If yes must notify nursing staff  BIPAP/CPAP skin barrier?   Yes    Skin barrier type:mepilexlite       Comments:        Shaheen Valle RCP

## 2023-09-24 NOTE — PROGRESS NOTES
no rashes or lesions  Psych:  AOA X 3  Neuro: grossly intact, moves all four extremities. Labs:   Recent Labs     09/22/23  0516 09/23/23  0440 09/24/23  0526   WBC 5.5 6.1 6.8   HGB 14.8 14.3 14.5   HCT 47.2 46.1 44.8    173 178     Recent Labs     09/22/23  0516 09/23/23  0440 09/24/23  0526    140 142   K 4.0 3.9 4.4    101 101   CO2 29 35* 33*   BUN 30* 26* 23*   CREATININE 1.0 1.0 0.8   CALCIUM 8.7 9.2 9.4     Recent Labs     09/22/23  0516 09/23/23  0440 09/24/23  0526   AST 18 16 20   ALT 18 23 21   BILITOT 0.2 0.2 0.2   ALKPHOS 90 92 89     No results for input(s): \"INR\" in the last 72 hours. No results for input(s): \"CKTOTAL\", \"TROPONINI\" in the last 72 hours. Recent Labs     09/22/23  0516 09/23/23  0440 09/24/23  0526   AST 18 16 20   ALT 18 23 21   BILITOT 0.2 0.2 0.2   ALKPHOS 90 92 89     No results for input(s): \"LACTA\" in the last 72 hours. Lab Results   Component Value Date    LABURIC 5.9 (H) 10/14/2022     No results found for: \"AMMONIA\"    Assessment:    Active Hospital Problems    Diagnosis Date Noted    Acute respiratory failure ([address]) [J96.00] 09/19/2023    Acute respiratory failure with hypoxia ([address]) [J96.01] 09/19/2023   DEPRESSION   HLD   II DM   COUGH  Plan:  CYMBALTA   SSI  CRESTOR   DOXY   ROCEPHIN   STEROIDS? MUCINEX    DVT Prophylaxis: HEPARIN  Diet: ADULT DIET; Regular; 3 carb choices (45 gm/meal)  Code Status: Full Code     PT/OT Eval Status:   WHEN STABLE     Dispo -  HOME    Electronically signed by Jacquenette Apley, DO on 9/24/2023 at 4:48 PM [facility]

## 2023-09-25 ENCOUNTER — APPOINTMENT (OUTPATIENT)
Dept: GENERAL RADIOLOGY | Age: 58
DRG: 189 | End: 2023-09-25
Payer: COMMERCIAL

## 2023-09-25 LAB
ALBUMIN SERPL-MCNC: 3.4 G/DL (ref 3.5–5.2)
ALP SERPL-CCNC: 80 U/L (ref 35–104)
ALT SERPL-CCNC: 20 U/L (ref 0–32)
ANION GAP SERPL CALCULATED.3IONS-SCNC: 7 MMOL/L (ref 7–16)
AST SERPL-CCNC: 13 U/L (ref 0–31)
BASOPHILS # BLD: 0.01 K/UL (ref 0–0.2)
BASOPHILS NFR BLD: 0 % (ref 0–2)
BILIRUB SERPL-MCNC: 0.3 MG/DL (ref 0–1.2)
BUN SERPL-MCNC: 18 MG/DL (ref 6–20)
CALCIUM SERPL-MCNC: 9.2 MG/DL (ref 8.6–10.2)
CHLORIDE SERPL-SCNC: 102 MMOL/L (ref 98–107)
CO2 SERPL-SCNC: 34 MMOL/L (ref 22–29)
CREAT SERPL-MCNC: 0.9 MG/DL (ref 0.5–1)
EOSINOPHIL # BLD: 0.1 K/UL (ref 0.05–0.5)
EOSINOPHILS RELATIVE PERCENT: 1 % (ref 0–6)
ERYTHROCYTE [DISTWIDTH] IN BLOOD BY AUTOMATED COUNT: 14.3 % (ref 11.5–15)
FIO2: 40
GFR SERPL CREATININE-BSD FRML MDRD: >60 ML/MIN/1.73M2
GLUCOSE BLD-MCNC: 103 MG/DL (ref 74–99)
GLUCOSE BLD-MCNC: 181 MG/DL (ref 74–99)
GLUCOSE BLD-MCNC: 181 MG/DL (ref 74–99)
GLUCOSE BLD-MCNC: 84 MG/DL (ref 74–99)
GLUCOSE SERPL-MCNC: 81 MG/DL (ref 74–99)
HCT VFR BLD AUTO: 46.3 % (ref 34–48)
HGB BLD-MCNC: 14.8 G/DL (ref 11.5–15.5)
IMM GRANULOCYTES # BLD AUTO: 0.05 K/UL (ref 0–0.58)
IMM GRANULOCYTES NFR BLD: 1 % (ref 0–5)
LYMPHOCYTES NFR BLD: 1.56 K/UL (ref 1.5–4)
LYMPHOCYTES RELATIVE PERCENT: 21 % (ref 20–42)
MCH RBC QN AUTO: 33.2 PG (ref 26–35)
MCHC RBC AUTO-ENTMCNC: 32 G/DL (ref 32–34.5)
MCV RBC AUTO: 103.8 FL (ref 80–99.9)
MONOCYTES NFR BLD: 0.43 K/UL (ref 0.1–0.95)
MONOCYTES NFR BLD: 6 % (ref 2–12)
NEUTROPHILS NFR BLD: 71 % (ref 43–80)
NEUTS SEG NFR BLD: 5.3 K/UL (ref 1.8–7.3)
O2 DELIVERY DEVICE: ABNORMAL
PH VENOUS: 7.31 (ref 7.35–7.45)
PLATELET # BLD AUTO: 183 K/UL (ref 130–450)
PMV BLD AUTO: 10.1 FL (ref 7–12)
POC HCO3: 33.9 MMOL/L (ref 22–26)
POC O2 SATURATION: 95.5 % (ref 92–98.5)
POC PCO2: 63.3 MM HG (ref 35–45)
POC PH: 7.34 (ref 7.35–7.45)
POC PO2: 86.5 MM HG (ref 80–100)
POSITIVE BASE EXCESS, ART: 5.5 MMOL/L (ref 0–3)
POTASSIUM SERPL-SCNC: 3.7 MMOL/L (ref 3.5–5)
PROT SERPL-MCNC: 6.2 G/DL (ref 6.4–8.3)
RBC # BLD AUTO: 4.46 M/UL (ref 3.5–5.5)
SODIUM SERPL-SCNC: 143 MMOL/L (ref 132–146)
WBC OTHER # BLD: 7.5 K/UL (ref 4.5–11.5)

## 2023-09-25 PROCEDURE — 85025 COMPLETE CBC W/AUTO DIFF WBC: CPT

## 2023-09-25 PROCEDURE — 82803 BLOOD GASES ANY COMBINATION: CPT

## 2023-09-25 PROCEDURE — 6370000000 HC RX 637 (ALT 250 FOR IP): Performed by: INTERNAL MEDICINE

## 2023-09-25 PROCEDURE — 6370000000 HC RX 637 (ALT 250 FOR IP): Performed by: STUDENT IN AN ORGANIZED HEALTH CARE EDUCATION/TRAINING PROGRAM

## 2023-09-25 PROCEDURE — 2580000003 HC RX 258: Performed by: STUDENT IN AN ORGANIZED HEALTH CARE EDUCATION/TRAINING PROGRAM

## 2023-09-25 PROCEDURE — 94660 CPAP INITIATION&MGMT: CPT

## 2023-09-25 PROCEDURE — 36415 COLL VENOUS BLD VENIPUNCTURE: CPT

## 2023-09-25 PROCEDURE — 36600 WITHDRAWAL OF ARTERIAL BLOOD: CPT

## 2023-09-25 PROCEDURE — 2060000000 HC ICU INTERMEDIATE R&B

## 2023-09-25 PROCEDURE — 71045 X-RAY EXAM CHEST 1 VIEW: CPT

## 2023-09-25 PROCEDURE — 6360000002 HC RX W HCPCS: Performed by: STUDENT IN AN ORGANIZED HEALTH CARE EDUCATION/TRAINING PROGRAM

## 2023-09-25 PROCEDURE — 80053 COMPREHEN METABOLIC PANEL: CPT

## 2023-09-25 PROCEDURE — 2700000000 HC OXYGEN THERAPY PER DAY

## 2023-09-25 PROCEDURE — 82962 GLUCOSE BLOOD TEST: CPT

## 2023-09-25 PROCEDURE — 94640 AIRWAY INHALATION TREATMENT: CPT

## 2023-09-25 RX ADMIN — ARFORMOTEROL TARTRATE 15 MCG: 15 SOLUTION RESPIRATORY (INHALATION) at 20:44

## 2023-09-25 RX ADMIN — HEPARIN SODIUM 5000 UNITS: 10000 INJECTION INTRAVENOUS; SUBCUTANEOUS at 07:43

## 2023-09-25 RX ADMIN — HEPARIN SODIUM 5000 UNITS: 10000 INJECTION INTRAVENOUS; SUBCUTANEOUS at 21:00

## 2023-09-25 RX ADMIN — BUDESONIDE INHALATION 500 MCG: 0.5 SUSPENSION RESPIRATORY (INHALATION) at 08:21

## 2023-09-25 RX ADMIN — DULOXETINE HYDROCHLORIDE 60 MG: 60 CAPSULE, DELAYED RELEASE ORAL at 20:59

## 2023-09-25 RX ADMIN — DULOXETINE HYDROCHLORIDE 60 MG: 60 CAPSULE, DELAYED RELEASE ORAL at 10:08

## 2023-09-25 RX ADMIN — IPRATROPIUM BROMIDE AND ALBUTEROL SULFATE 1 DOSE: .5; 2.5 SOLUTION RESPIRATORY (INHALATION) at 20:44

## 2023-09-25 RX ADMIN — PREDNISONE 40 MG: 20 TABLET ORAL at 10:09

## 2023-09-25 RX ADMIN — GUAIFENESIN 400 MG: 400 TABLET ORAL at 10:09

## 2023-09-25 RX ADMIN — IPRATROPIUM BROMIDE AND ALBUTEROL SULFATE 1 DOSE: .5; 2.5 SOLUTION RESPIRATORY (INHALATION) at 12:58

## 2023-09-25 RX ADMIN — BUPROPION HYDROCHLORIDE 150 MG: 150 TABLET, EXTENDED RELEASE ORAL at 10:08

## 2023-09-25 RX ADMIN — ALLOPURINOL 300 MG: 300 TABLET ORAL at 10:09

## 2023-09-25 RX ADMIN — DEXTROAMPHETAMINE SULFATE, DEXTROAMPHETAMINE SACCHARATE, AMPHETAMINE SULFATE AND AMPHETAMINE ASPARTATE 30 MG: 2.5; 2.5; 2.5; 2.5 CAPSULE, EXTENDED RELEASE ORAL at 10:08

## 2023-09-25 RX ADMIN — Medication 10 ML: at 10:09

## 2023-09-25 RX ADMIN — HEPARIN SODIUM 5000 UNITS: 10000 INJECTION INTRAVENOUS; SUBCUTANEOUS at 14:41

## 2023-09-25 RX ADMIN — GUAIFENESIN 400 MG: 400 TABLET ORAL at 20:59

## 2023-09-25 RX ADMIN — Medication 10 ML: at 21:52

## 2023-09-25 RX ADMIN — IPRATROPIUM BROMIDE AND ALBUTEROL SULFATE 1 DOSE: .5; 2.5 SOLUTION RESPIRATORY (INHALATION) at 15:29

## 2023-09-25 RX ADMIN — IPRATROPIUM BROMIDE AND ALBUTEROL SULFATE 1 DOSE: .5; 2.5 SOLUTION RESPIRATORY (INHALATION) at 08:22

## 2023-09-25 RX ADMIN — PANTOPRAZOLE SODIUM 40 MG: 40 TABLET, DELAYED RELEASE ORAL at 07:45

## 2023-09-25 RX ADMIN — ARFORMOTEROL TARTRATE 15 MCG: 15 SOLUTION RESPIRATORY (INHALATION) at 08:21

## 2023-09-25 RX ADMIN — GUAIFENESIN 400 MG: 400 TABLET ORAL at 14:41

## 2023-09-25 RX ADMIN — BUDESONIDE INHALATION 500 MCG: 0.5 SUSPENSION RESPIRATORY (INHALATION) at 20:44

## 2023-09-25 RX ADMIN — ACETAMINOPHEN 650 MG: 325 TABLET ORAL at 12:08

## 2023-09-25 RX ADMIN — ROSUVASTATIN CALCIUM 20 MG: 20 TABLET, FILM COATED ORAL at 10:08

## 2023-09-25 ASSESSMENT — PAIN SCALES - GENERAL
PAINLEVEL_OUTOF10: 4
PAINLEVEL_OUTOF10: 0
PAINLEVEL_OUTOF10: 0

## 2023-09-25 ASSESSMENT — PAIN DESCRIPTION - LOCATION: LOCATION: GENERALIZED

## 2023-09-25 NOTE — PLAN OF CARE
Problem: Discharge Planning  Goal: Discharge to home or other facility with appropriate resources  9/25/2023 1221 by Jb Hoffman RN  Outcome: Progressing  9/25/2023 0145 by Baylee Glsagow RN  Outcome: Progressing     Problem: Respiratory - Adult  Goal: Achieves optimal ventilation and oxygenation  9/25/2023 1221 by Jb Hoffman RN  Outcome: Progressing  9/25/2023 0145 by Baylee Glasgow RN  Outcome: Progressing     Problem: ABCDS Injury Assessment  Goal: Absence of physical injury  9/25/2023 1221 by Jb Hoffman RN  Outcome: Progressing  9/25/2023 0145 by Baylee Glasgow RN  Outcome: Progressing     Problem: Safety - Adult  Goal: Free from fall injury  9/25/2023 1221 by Jb Hoffman RN  Outcome: Progressing  9/25/2023 0145 by Baylee Glasgow RN  Outcome: Progressing     Problem: Skin/Tissue Integrity  Goal: Absence of new skin breakdown  Description: 1. Monitor for areas of redness and/or skin breakdown  2. Assess vascular access sites hourly  3. Every 4-6 hours minimum:  Change oxygen saturation probe site  4. Every 4-6 hours:  If on nasal continuous positive airway pressure, respiratory therapy assess nares and determine need for appliance change or resting period.   9/25/2023 1221 by Jb Hoffman RN  Outcome: Progressing  9/25/2023 0145 by Baylee Glasgow RN  Outcome: Progressing     Problem: Pain  Goal: Verbalizes/displays adequate comfort level or baseline comfort level  9/25/2023 1221 by Jb Hoffman RN  Outcome: Progressing  9/25/2023 0145 by Baylee Glasgow RN  Outcome: Progressing

## 2023-09-25 NOTE — CARE COORDINATION
9/25/23 Update CM Note. Pt admitted 9/19/23 Acute respiratory failure with hypoxia. Transferred out of ICU 9/24/23. Pt currently on 8L [place] O2, with SpO2 at 97% Pt does not have O2 at home  Choice of provided offered, no preference should O2 be required at DC. Will need walking pulse ox. Rush PIERRE RN-BC  [phone]    The Plan for Transition of Care is related to the following treatment goals: home O2    The Patient was provided with a choice of provider and agrees   with the discharge plan.  [x] Yes [] No    Freedom of choice list was provided with basic dialogue that supports the patient's individualized plan of care/goals   [x] Yes [] No

## 2023-09-26 LAB
ALBUMIN SERPL-MCNC: 3.4 G/DL (ref 3.5–5.2)
ALP SERPL-CCNC: 81 U/L (ref 35–104)
ALT SERPL-CCNC: 21 U/L (ref 0–32)
ANION GAP SERPL CALCULATED.3IONS-SCNC: 6 MMOL/L (ref 7–16)
AST SERPL-CCNC: 12 U/L (ref 0–31)
BASOPHILS # BLD: 0.01 K/UL (ref 0–0.2)
BASOPHILS NFR BLD: 0 % (ref 0–2)
BILIRUB SERPL-MCNC: 0.3 MG/DL (ref 0–1.2)
BUN SERPL-MCNC: 17 MG/DL (ref 6–20)
CALCIUM SERPL-MCNC: 9.4 MG/DL (ref 8.6–10.2)
CHLORIDE SERPL-SCNC: 101 MMOL/L (ref 98–107)
CO2 SERPL-SCNC: 35 MMOL/L (ref 22–29)
CREAT SERPL-MCNC: 0.8 MG/DL (ref 0.5–1)
EOSINOPHIL # BLD: 0.1 K/UL (ref 0.05–0.5)
EOSINOPHILS RELATIVE PERCENT: 2 % (ref 0–6)
ERYTHROCYTE [DISTWIDTH] IN BLOOD BY AUTOMATED COUNT: 14.4 % (ref 11.5–15)
GFR SERPL CREATININE-BSD FRML MDRD: >60 ML/MIN/1.73M2
GLUCOSE BLD-MCNC: 103 MG/DL (ref 74–99)
GLUCOSE BLD-MCNC: 106 MG/DL (ref 74–99)
GLUCOSE BLD-MCNC: 118 MG/DL (ref 74–99)
GLUCOSE BLD-MCNC: 154 MG/DL (ref 74–99)
GLUCOSE SERPL-MCNC: 82 MG/DL (ref 74–99)
HCT VFR BLD AUTO: 45.4 % (ref 34–48)
HGB BLD-MCNC: 14.6 G/DL (ref 11.5–15.5)
IMM GRANULOCYTES # BLD AUTO: 0.03 K/UL (ref 0–0.58)
IMM GRANULOCYTES NFR BLD: 0 % (ref 0–5)
LYMPHOCYTES NFR BLD: 1.37 K/UL (ref 1.5–4)
LYMPHOCYTES RELATIVE PERCENT: 20 % (ref 20–42)
MCH RBC QN AUTO: 33 PG (ref 26–35)
MCHC RBC AUTO-ENTMCNC: 32.2 G/DL (ref 32–34.5)
MCV RBC AUTO: 102.7 FL (ref 80–99.9)
MONOCYTES NFR BLD: 0.38 K/UL (ref 0.1–0.95)
MONOCYTES NFR BLD: 6 % (ref 2–12)
NEUTROPHILS NFR BLD: 73 % (ref 43–80)
NEUTS SEG NFR BLD: 4.99 K/UL (ref 1.8–7.3)
PLATELET # BLD AUTO: 192 K/UL (ref 130–450)
PMV BLD AUTO: 10.2 FL (ref 7–12)
POTASSIUM SERPL-SCNC: 3.7 MMOL/L (ref 3.5–5)
PROT SERPL-MCNC: 6.3 G/DL (ref 6.4–8.3)
RBC # BLD AUTO: 4.42 M/UL (ref 3.5–5.5)
SODIUM SERPL-SCNC: 142 MMOL/L (ref 132–146)
WBC OTHER # BLD: 6.9 K/UL (ref 4.5–11.5)

## 2023-09-26 PROCEDURE — 36415 COLL VENOUS BLD VENIPUNCTURE: CPT

## 2023-09-26 PROCEDURE — 94640 AIRWAY INHALATION TREATMENT: CPT

## 2023-09-26 PROCEDURE — 6370000000 HC RX 637 (ALT 250 FOR IP): Performed by: STUDENT IN AN ORGANIZED HEALTH CARE EDUCATION/TRAINING PROGRAM

## 2023-09-26 PROCEDURE — 80053 COMPREHEN METABOLIC PANEL: CPT

## 2023-09-26 PROCEDURE — 94660 CPAP INITIATION&MGMT: CPT

## 2023-09-26 PROCEDURE — 6360000002 HC RX W HCPCS: Performed by: INTERNAL MEDICINE

## 2023-09-26 PROCEDURE — 6370000000 HC RX 637 (ALT 250 FOR IP): Performed by: INTERNAL MEDICINE

## 2023-09-26 PROCEDURE — 6360000002 HC RX W HCPCS: Performed by: STUDENT IN AN ORGANIZED HEALTH CARE EDUCATION/TRAINING PROGRAM

## 2023-09-26 PROCEDURE — 85025 COMPLETE CBC W/AUTO DIFF WBC: CPT

## 2023-09-26 PROCEDURE — 2580000003 HC RX 258: Performed by: STUDENT IN AN ORGANIZED HEALTH CARE EDUCATION/TRAINING PROGRAM

## 2023-09-26 PROCEDURE — 99232 SBSQ HOSP IP/OBS MODERATE 35: CPT | Performed by: INTERNAL MEDICINE

## 2023-09-26 PROCEDURE — 82962 GLUCOSE BLOOD TEST: CPT

## 2023-09-26 PROCEDURE — 2060000000 HC ICU INTERMEDIATE R&B

## 2023-09-26 PROCEDURE — 2700000000 HC OXYGEN THERAPY PER DAY

## 2023-09-26 RX ORDER — INSULIN LISPRO 100 [IU]/ML
0-4 INJECTION, SOLUTION INTRAVENOUS; SUBCUTANEOUS
Status: DISCONTINUED | OUTPATIENT
Start: 2023-09-26 | End: 2023-09-28 | Stop reason: HOSPADM

## 2023-09-26 RX ADMIN — IPRATROPIUM BROMIDE AND ALBUTEROL SULFATE 1 DOSE: .5; 2.5 SOLUTION RESPIRATORY (INHALATION) at 20:24

## 2023-09-26 RX ADMIN — DULOXETINE HYDROCHLORIDE 60 MG: 60 CAPSULE, DELAYED RELEASE ORAL at 09:20

## 2023-09-26 RX ADMIN — BUDESONIDE INHALATION 500 MCG: 0.5 SUSPENSION RESPIRATORY (INHALATION) at 09:36

## 2023-09-26 RX ADMIN — DEXTROAMPHETAMINE SULFATE, DEXTROAMPHETAMINE SACCHARATE, AMPHETAMINE SULFATE AND AMPHETAMINE ASPARTATE 30 MG: 2.5; 2.5; 2.5; 2.5 CAPSULE, EXTENDED RELEASE ORAL at 09:19

## 2023-09-26 RX ADMIN — IPRATROPIUM BROMIDE AND ALBUTEROL SULFATE 1 DOSE: .5; 2.5 SOLUTION RESPIRATORY (INHALATION) at 09:37

## 2023-09-26 RX ADMIN — IPRATROPIUM BROMIDE AND ALBUTEROL SULFATE 1 DOSE: .5; 2.5 SOLUTION RESPIRATORY (INHALATION) at 13:11

## 2023-09-26 RX ADMIN — ARFORMOTEROL TARTRATE 15 MCG: 15 SOLUTION RESPIRATORY (INHALATION) at 20:24

## 2023-09-26 RX ADMIN — ROSUVASTATIN CALCIUM 20 MG: 20 TABLET, FILM COATED ORAL at 09:20

## 2023-09-26 RX ADMIN — GUAIFENESIN 400 MG: 400 TABLET ORAL at 15:44

## 2023-09-26 RX ADMIN — Medication 10 ML: at 21:01

## 2023-09-26 RX ADMIN — GUAIFENESIN 400 MG: 400 TABLET ORAL at 09:19

## 2023-09-26 RX ADMIN — PANTOPRAZOLE SODIUM 40 MG: 40 TABLET, DELAYED RELEASE ORAL at 06:36

## 2023-09-26 RX ADMIN — ALLOPURINOL 300 MG: 300 TABLET ORAL at 09:20

## 2023-09-26 RX ADMIN — HEPARIN SODIUM 5000 UNITS: 10000 INJECTION INTRAVENOUS; SUBCUTANEOUS at 15:44

## 2023-09-26 RX ADMIN — Medication 10 ML: at 09:20

## 2023-09-26 RX ADMIN — IPRATROPIUM BROMIDE AND ALBUTEROL SULFATE 1 DOSE: .5; 2.5 SOLUTION RESPIRATORY (INHALATION) at 16:04

## 2023-09-26 RX ADMIN — HEPARIN SODIUM 5000 UNITS: 10000 INJECTION INTRAVENOUS; SUBCUTANEOUS at 21:00

## 2023-09-26 RX ADMIN — BUPROPION HYDROCHLORIDE 150 MG: 150 TABLET, EXTENDED RELEASE ORAL at 09:19

## 2023-09-26 RX ADMIN — GUAIFENESIN 400 MG: 400 TABLET ORAL at 21:01

## 2023-09-26 RX ADMIN — ARFORMOTEROL TARTRATE 15 MCG: 15 SOLUTION RESPIRATORY (INHALATION) at 09:36

## 2023-09-26 RX ADMIN — METHYLPREDNISOLONE SODIUM SUCCINATE 40 MG: 40 INJECTION, POWDER, FOR SOLUTION INTRAMUSCULAR; INTRAVENOUS at 17:22

## 2023-09-26 RX ADMIN — BUDESONIDE INHALATION 500 MCG: 0.5 SUSPENSION RESPIRATORY (INHALATION) at 20:24

## 2023-09-26 RX ADMIN — DULOXETINE HYDROCHLORIDE 60 MG: 60 CAPSULE, DELAYED RELEASE ORAL at 21:01

## 2023-09-26 RX ADMIN — HEPARIN SODIUM 5000 UNITS: 10000 INJECTION INTRAVENOUS; SUBCUTANEOUS at 06:36

## 2023-09-26 ASSESSMENT — PAIN SCALES - GENERAL
PAINLEVEL_OUTOF10: 0

## 2023-09-26 NOTE — PLAN OF CARE
Problem: Discharge Planning  Goal: Discharge to home or other facility with appropriate resources  9/25/2023 2202 by Cristiana Olivarez RN  Outcome: Progressing  Flowsheets (Taken 9/25/2023 2000)  Discharge to home or other facility with appropriate resources: Identify barriers to discharge with patient and caregiver  9/25/2023 1221 by Tere Heller RN  Outcome: Progressing     Problem: Respiratory - Adult  Goal: Achieves optimal ventilation and oxygenation  9/25/2023 2202 by Cristiana Olivarez RN  Outcome: Progressing  Flowsheets (Taken 9/25/2023 2000)  Achieves optimal ventilation and oxygenation: Assess for changes in respiratory status  9/25/2023 1221 by Tere Heller RN  Outcome: Progressing     Problem: ABCDS Injury Assessment  Goal: Absence of physical injury  9/25/2023 2202 by Cristiana Olivarez RN  Outcome: Progressing  9/25/2023 1221 by Tere Heller RN  Outcome: Progressing     Problem: Safety - Adult  Goal: Free from fall injury  9/25/2023 2202 by Cristiana Olivarez RN  Outcome: Progressing  9/25/2023 1221 by Tere Heller RN  Outcome: Progressing     Problem: Skin/Tissue Integrity  Goal: Absence of new skin breakdown  Description: 1. Monitor for areas of redness and/or skin breakdown  2. Assess vascular access sites hourly  3. Every 4-6 hours minimum:  Change oxygen saturation probe site  4. Every 4-6 hours:  If on nasal continuous positive airway pressure, respiratory therapy assess nares and determine need for appliance change or resting period.   9/25/2023 2202 by Cristiana Olivarez RN  Outcome: Progressing  9/25/2023 1221 by Tere Heller RN  Outcome: Progressing     Problem: Pain  Goal: Verbalizes/displays adequate comfort level or baseline comfort level  9/25/2023 2202 by Cristiana Olivarez RN  Outcome: Progressing  Flowsheets (Taken 9/25/2023 2008)  Verbalizes/displays adequate comfort level or baseline comfort level: Encourage patient to monitor pain and request assistance  9/25/2023 1221 by Lulu Chambers RN  Outcome: Progressing

## 2023-09-26 NOTE — PROGRESS NOTES
Comprehensive Nutrition Assessment    Type and Reason for Visit:  Initial (LOS)    Nutrition Recommendations/Plan:   Recommend and start Glucerna supplement daily and Magic cup supplement daily to help meet nutritional needs. Malnutrition Assessment:  Malnutrition Status: At risk for malnutrition (Comment) (09/26/23 1233)    Context:  Acute Illness     Findings of the 6 clinical characteristics of malnutrition:  Energy Intake:  75% or less of estimated energy requirements for 7 or more days  Weight Loss:  Unable to assess (d/t possible fluid shifts and possible weight discrepancies)     Body Fat Loss:  Unable to assess (d/t isolation)     Muscle Mass Loss:  Unable to assess (d/t isolation)    Fluid Accumulation:  No significant fluid accumulation     Strength:  Not Performed    Nutrition Assessment:    Patient adm w/ SOB ; noted Rhinovirus and acute respiratory failure with hypoxia ; adm w/ JARED ; hx of DM and COPD ; hx of ADHD and depression ; will start ONS    Nutrition Related Findings:    +I&Os (+3.9 L), trace edema, A&O x 4, active BS, rounded abd ; Wound Type: None       Current Nutrition Intake & Therapies:    Average Meal Intake: 51-75% (limited meals recorded in flowsheets)     ADULT DIET; Regular; 3 carb choices (45 gm/meal)    Anthropometric Measures:  Height: 5' 4\" (162.6 cm)  Ideal Body Weight (IBW): 120 lbs (55 kg)    Admission Body Weight: 195 lb (88.5 kg) (9/19, bedscale)  Current Body Weight: 195 lb (88.5 kg) (9/19, bedscale ; admission weight), 162.5 % IBW. Current BMI (kg/m2): 33.5  Usual Body Weight:  (UTO ; EMR shows past weight of 239# actual on 10/19/22)                       BMI Categories: Obese Class 1 (BMI 30.0-34. 9)    Estimated Daily Nutrient Needs:  Energy Requirements Based On: Formula  Weight Used for Energy Requirements: Admission  Energy (kcal/day): 7315-5192 (REE 1446 x 1.2 SF)  Weight Used for Protein Requirements: Ideal  Protein (g/day):  (1.5-1.8g/kg

## 2023-09-26 NOTE — PLAN OF CARE
Problem: Discharge Planning  Goal: Discharge to home or other facility with appropriate resources  Outcome: Progressing  Flowsheets (Taken 9/26/2023 0915)  Discharge to home or other facility with appropriate resources: Identify barriers to discharge with patient and caregiver     Problem: Respiratory - Adult  Goal: Achieves optimal ventilation and oxygenation  Outcome: Progressing     Problem: ABCDS Injury Assessment  Goal: Absence of physical injury  Outcome: Progressing  Flowsheets (Taken 9/26/2023 0914)  Absence of Physical Injury: Implement safety measures based on patient assessment     Problem: Safety - Adult  Goal: Free from fall injury  Outcome: Progressing  Flowsheets (Taken 9/26/2023 0914)  Free From Fall Injury: Instruct family/caregiver on patient safety     Problem: Skin/Tissue Integrity  Goal: Absence of new skin breakdown  Description: 1. Monitor for areas of redness and/or skin breakdown  2. Assess vascular access sites hourly  3. Every 4-6 hours minimum:  Change oxygen saturation probe site  4. Every 4-6 hours:  If on nasal continuous positive airway pressure, respiratory therapy assess nares and determine need for appliance change or resting period.   Outcome: Progressing     Problem: Pain  Goal: Verbalizes/displays adequate comfort level or baseline comfort level  Outcome: Progressing  Flowsheets (Taken 9/26/2023 0512 by Jes Echevarria RN)  Verbalizes/displays adequate comfort level or baseline comfort level: Encourage patient to monitor pain and request assistance

## 2023-09-26 NOTE — ACP (ADVANCE CARE PLANNING)
Advance Care Planning   Healthcare Decision Maker:    Primary Decision Maker: Sunshine Buck - Child - [phone]    Click here to complete Healthcare Decision Makers including selection of the Healthcare Decision Maker Relationship (ie \"Primary\").

## 2023-09-26 NOTE — CARE COORDINATION
9/26/23 Update CM Note. Pt admitted 9/19/23 Acute respiratory failure with hypoxia. Transferred out of ICU 9/24/23. Pt currently on 8L [place] O2, with SpO2 at 98%. Planning to attempt to wean down O2. Pt does not have O2 at home, [facility] to [facility], awaiting acceptance (if accepted the can accommodate 8L if unable to wean)  Will need walking pulse ox. Discharge plan to return home, son will provide transportation.   Kinsey NAILSN RN-BC  [phone] Addendum:  [facility] accepted pt for O2 needs after discharge

## 2023-09-27 LAB
ALBUMIN SERPL-MCNC: 3.4 G/DL (ref 3.5–5.2)
ALP SERPL-CCNC: 86 U/L (ref 35–104)
ALT SERPL-CCNC: 26 U/L (ref 0–32)
ANION GAP SERPL CALCULATED.3IONS-SCNC: 10 MMOL/L (ref 7–16)
AST SERPL-CCNC: 14 U/L (ref 0–31)
BASOPHILS # BLD: 0.01 K/UL (ref 0–0.2)
BASOPHILS NFR BLD: 0 % (ref 0–2)
BILIRUB SERPL-MCNC: 0.3 MG/DL (ref 0–1.2)
BUN SERPL-MCNC: 22 MG/DL (ref 6–20)
CALCIUM SERPL-MCNC: 9.4 MG/DL (ref 8.6–10.2)
CHLORIDE SERPL-SCNC: 101 MMOL/L (ref 98–107)
CO2 SERPL-SCNC: 32 MMOL/L (ref 22–29)
CREAT SERPL-MCNC: 0.9 MG/DL (ref 0.5–1)
EOSINOPHIL # BLD: 0 K/UL (ref 0.05–0.5)
EOSINOPHILS RELATIVE PERCENT: 0 % (ref 0–6)
ERYTHROCYTE [DISTWIDTH] IN BLOOD BY AUTOMATED COUNT: 14.2 % (ref 11.5–15)
GFR SERPL CREATININE-BSD FRML MDRD: >60 ML/MIN/1.73M2
GLUCOSE BLD-MCNC: 103 MG/DL (ref 74–99)
GLUCOSE BLD-MCNC: 112 MG/DL (ref 74–99)
GLUCOSE BLD-MCNC: 116 MG/DL (ref 74–99)
GLUCOSE BLD-MCNC: 85 MG/DL (ref 74–99)
GLUCOSE SERPL-MCNC: 112 MG/DL (ref 74–99)
HCT VFR BLD AUTO: 45.9 % (ref 34–48)
HGB BLD-MCNC: 15 G/DL (ref 11.5–15.5)
IMM GRANULOCYTES # BLD AUTO: 0.05 K/UL (ref 0–0.58)
IMM GRANULOCYTES NFR BLD: 1 % (ref 0–5)
LYMPHOCYTES NFR BLD: 0.5 K/UL (ref 1.5–4)
LYMPHOCYTES RELATIVE PERCENT: 7 % (ref 20–42)
MCH RBC QN AUTO: 33.4 PG (ref 26–35)
MCHC RBC AUTO-ENTMCNC: 32.7 G/DL (ref 32–34.5)
MCV RBC AUTO: 102.2 FL (ref 80–99.9)
MONOCYTES NFR BLD: 0.16 K/UL (ref 0.1–0.95)
MONOCYTES NFR BLD: 2 % (ref 2–12)
NEUTROPHILS NFR BLD: 91 % (ref 43–80)
NEUTS SEG NFR BLD: 6.93 K/UL (ref 1.8–7.3)
PLATELET # BLD AUTO: 199 K/UL (ref 130–450)
PMV BLD AUTO: 10.7 FL (ref 7–12)
POTASSIUM SERPL-SCNC: 4.2 MMOL/L (ref 3.5–5)
PROT SERPL-MCNC: 6.4 G/DL (ref 6.4–8.3)
RBC # BLD AUTO: 4.49 M/UL (ref 3.5–5.5)
RBC # BLD: ABNORMAL 10*6/UL
SODIUM SERPL-SCNC: 143 MMOL/L (ref 132–146)
WBC OTHER # BLD: 7.7 K/UL (ref 4.5–11.5)

## 2023-09-27 PROCEDURE — 80053 COMPREHEN METABOLIC PANEL: CPT

## 2023-09-27 PROCEDURE — 6370000000 HC RX 637 (ALT 250 FOR IP): Performed by: INTERNAL MEDICINE

## 2023-09-27 PROCEDURE — 2580000003 HC RX 258: Performed by: STUDENT IN AN ORGANIZED HEALTH CARE EDUCATION/TRAINING PROGRAM

## 2023-09-27 PROCEDURE — 2700000000 HC OXYGEN THERAPY PER DAY

## 2023-09-27 PROCEDURE — 2060000000 HC ICU INTERMEDIATE R&B

## 2023-09-27 PROCEDURE — 6360000002 HC RX W HCPCS: Performed by: STUDENT IN AN ORGANIZED HEALTH CARE EDUCATION/TRAINING PROGRAM

## 2023-09-27 PROCEDURE — 36415 COLL VENOUS BLD VENIPUNCTURE: CPT

## 2023-09-27 PROCEDURE — 82962 GLUCOSE BLOOD TEST: CPT

## 2023-09-27 PROCEDURE — 85025 COMPLETE CBC W/AUTO DIFF WBC: CPT

## 2023-09-27 PROCEDURE — 94660 CPAP INITIATION&MGMT: CPT

## 2023-09-27 PROCEDURE — 6370000000 HC RX 637 (ALT 250 FOR IP): Performed by: STUDENT IN AN ORGANIZED HEALTH CARE EDUCATION/TRAINING PROGRAM

## 2023-09-27 PROCEDURE — 94640 AIRWAY INHALATION TREATMENT: CPT

## 2023-09-27 RX ADMIN — DULOXETINE HYDROCHLORIDE 60 MG: 60 CAPSULE, DELAYED RELEASE ORAL at 22:14

## 2023-09-27 RX ADMIN — HEPARIN SODIUM 5000 UNITS: 10000 INJECTION INTRAVENOUS; SUBCUTANEOUS at 05:07

## 2023-09-27 RX ADMIN — IPRATROPIUM BROMIDE AND ALBUTEROL SULFATE 1 DOSE: .5; 2.5 SOLUTION RESPIRATORY (INHALATION) at 19:20

## 2023-09-27 RX ADMIN — IPRATROPIUM BROMIDE AND ALBUTEROL SULFATE 1 DOSE: .5; 2.5 SOLUTION RESPIRATORY (INHALATION) at 15:51

## 2023-09-27 RX ADMIN — BUDESONIDE INHALATION 500 MCG: 0.5 SUSPENSION RESPIRATORY (INHALATION) at 19:22

## 2023-09-27 RX ADMIN — GUAIFENESIN 400 MG: 400 TABLET ORAL at 22:14

## 2023-09-27 RX ADMIN — IPRATROPIUM BROMIDE AND ALBUTEROL SULFATE 1 DOSE: .5; 2.5 SOLUTION RESPIRATORY (INHALATION) at 12:19

## 2023-09-27 RX ADMIN — HEPARIN SODIUM 5000 UNITS: 10000 INJECTION INTRAVENOUS; SUBCUTANEOUS at 22:13

## 2023-09-27 RX ADMIN — ALLOPURINOL 300 MG: 300 TABLET ORAL at 11:05

## 2023-09-27 RX ADMIN — ARFORMOTEROL TARTRATE 15 MCG: 15 SOLUTION RESPIRATORY (INHALATION) at 08:09

## 2023-09-27 RX ADMIN — HEPARIN SODIUM 5000 UNITS: 10000 INJECTION INTRAVENOUS; SUBCUTANEOUS at 17:04

## 2023-09-27 RX ADMIN — Medication 10 ML: at 22:14

## 2023-09-27 RX ADMIN — ROSUVASTATIN CALCIUM 20 MG: 20 TABLET, FILM COATED ORAL at 11:05

## 2023-09-27 RX ADMIN — Medication 10 ML: at 11:06

## 2023-09-27 RX ADMIN — BUPROPION HYDROCHLORIDE 150 MG: 150 TABLET, EXTENDED RELEASE ORAL at 11:05

## 2023-09-27 RX ADMIN — GUAIFENESIN 400 MG: 400 TABLET ORAL at 11:05

## 2023-09-27 RX ADMIN — IPRATROPIUM BROMIDE AND ALBUTEROL SULFATE 1 DOSE: .5; 2.5 SOLUTION RESPIRATORY (INHALATION) at 08:09

## 2023-09-27 RX ADMIN — BUDESONIDE INHALATION 500 MCG: 0.5 SUSPENSION RESPIRATORY (INHALATION) at 08:09

## 2023-09-27 RX ADMIN — DULOXETINE HYDROCHLORIDE 60 MG: 60 CAPSULE, DELAYED RELEASE ORAL at 11:05

## 2023-09-27 RX ADMIN — DEXTROAMPHETAMINE SULFATE, DEXTROAMPHETAMINE SACCHARATE, AMPHETAMINE SULFATE AND AMPHETAMINE ASPARTATE 30 MG: 2.5; 2.5; 2.5; 2.5 CAPSULE, EXTENDED RELEASE ORAL at 11:05

## 2023-09-27 RX ADMIN — ARFORMOTEROL TARTRATE 15 MCG: 15 SOLUTION RESPIRATORY (INHALATION) at 19:21

## 2023-09-27 RX ADMIN — PANTOPRAZOLE SODIUM 40 MG: 40 TABLET, DELAYED RELEASE ORAL at 05:07

## 2023-09-27 RX ADMIN — GUAIFENESIN 400 MG: 400 TABLET ORAL at 17:03

## 2023-09-27 ASSESSMENT — PAIN SCALES - GENERAL: PAINLEVEL_OUTOF10: 0

## 2023-09-27 NOTE — PLAN OF CARE
Problem: Discharge Planning  Goal: Discharge to home or other facility with appropriate resources  9/26/2023 2313 by Ramona Tucker RN  Outcome: Progressing     Problem: Respiratory - Adult  Goal: Achieves optimal ventilation and oxygenation  9/26/2023 2313 by Ramona Tucker RN  Outcome: Progressing     Problem: ABCDS Injury Assessment  Goal: Absence of physical injury  9/26/2023 2313 by Ramona Tucker RN  Outcome: Progressing     Problem: Safety - Adult  Goal: Free from fall injury  9/26/2023 2313 by Ramona Tucker RN  Outcome: Progressing     Problem: Skin/Tissue Integrity  Goal: Absence of new skin breakdown  Description: 1. Monitor for areas of redness and/or skin breakdown  2. Assess vascular access sites hourly  3. Every 4-6 hours minimum:  Change oxygen saturation probe site  4. Every 4-6 hours:  If on nasal continuous positive airway pressure, respiratory therapy assess nares and determine need for appliance change or resting period.   9/26/2023 2313 by Ramona Tucker RN  Outcome: Progressing

## 2023-09-27 NOTE — PLAN OF CARE
Problem: Discharge Planning  Goal: Discharge to home or other facility with appropriate resources  Outcome: Progressing     Problem: Respiratory - Adult  Goal: Achieves optimal ventilation and oxygenation  Outcome: Progressing     Problem: ABCDS Injury Assessment  Goal: Absence of physical injury  Outcome: Progressing     Problem: Safety - Adult  Goal: Free from fall injury  Outcome: Progressing     Problem: Skin/Tissue Integrity  Goal: Absence of new skin breakdown  Description: 1. Monitor for areas of redness and/or skin breakdown  2. Assess vascular access sites hourly  3. Every 4-6 hours minimum:  Change oxygen saturation probe site  4. Every 4-6 hours:  If on nasal continuous positive airway pressure, respiratory therapy assess nares and determine need for appliance change or resting period.   Outcome: Progressing     Problem: Pain  Goal: Verbalizes/displays adequate comfort level or baseline comfort level  Outcome: Progressing     Problem: Nutrition Deficit:  Goal: Optimize nutritional status  Outcome: Progressing

## 2023-09-28 VITALS
SYSTOLIC BLOOD PRESSURE: 125 MMHG | RESPIRATION RATE: 18 BRPM | HEART RATE: 92 BPM | BODY MASS INDEX: 41.89 KG/M2 | OXYGEN SATURATION: 94 % | DIASTOLIC BLOOD PRESSURE: 66 MMHG | TEMPERATURE: 97.9 F | HEIGHT: 64 IN | WEIGHT: 245.4 LBS

## 2023-09-28 LAB
ALBUMIN SERPL-MCNC: 3.4 G/DL (ref 3.5–5.2)
ALP SERPL-CCNC: 79 U/L (ref 35–104)
ALT SERPL-CCNC: 26 U/L (ref 0–32)
ANION GAP SERPL CALCULATED.3IONS-SCNC: 11 MMOL/L (ref 7–16)
AST SERPL-CCNC: 15 U/L (ref 0–31)
BASOPHILS # BLD: 0.02 K/UL (ref 0–0.2)
BASOPHILS NFR BLD: 0 % (ref 0–2)
BILIRUB SERPL-MCNC: 0.4 MG/DL (ref 0–1.2)
BUN SERPL-MCNC: 23 MG/DL (ref 6–20)
CALCIUM SERPL-MCNC: 9.5 MG/DL (ref 8.6–10.2)
CHLORIDE SERPL-SCNC: 101 MMOL/L (ref 98–107)
CO2 SERPL-SCNC: 32 MMOL/L (ref 22–29)
CREAT SERPL-MCNC: 1 MG/DL (ref 0.5–1)
EOSINOPHIL # BLD: 0.17 K/UL (ref 0.05–0.5)
EOSINOPHILS RELATIVE PERCENT: 2 % (ref 0–6)
ERYTHROCYTE [DISTWIDTH] IN BLOOD BY AUTOMATED COUNT: 14.5 % (ref 11.5–15)
GFR SERPL CREATININE-BSD FRML MDRD: >60 ML/MIN/1.73M2
GLUCOSE BLD-MCNC: 92 MG/DL (ref 74–99)
GLUCOSE SERPL-MCNC: 94 MG/DL (ref 74–99)
HCT VFR BLD AUTO: 45.4 % (ref 34–48)
HGB BLD-MCNC: 14.5 G/DL (ref 11.5–15.5)
IMM GRANULOCYTES # BLD AUTO: 0.04 K/UL (ref 0–0.58)
IMM GRANULOCYTES NFR BLD: 1 % (ref 0–5)
LYMPHOCYTES NFR BLD: 1.38 K/UL (ref 1.5–4)
LYMPHOCYTES RELATIVE PERCENT: 19 % (ref 20–42)
MCH RBC QN AUTO: 32.8 PG (ref 26–35)
MCHC RBC AUTO-ENTMCNC: 31.9 G/DL (ref 32–34.5)
MCV RBC AUTO: 102.7 FL (ref 80–99.9)
MONOCYTES NFR BLD: 0.45 K/UL (ref 0.1–0.95)
MONOCYTES NFR BLD: 6 % (ref 2–12)
NEUTROPHILS NFR BLD: 72 % (ref 43–80)
NEUTS SEG NFR BLD: 5.18 K/UL (ref 1.8–7.3)
PLATELET # BLD AUTO: 184 K/UL (ref 130–450)
PMV BLD AUTO: 10.3 FL (ref 7–12)
POTASSIUM SERPL-SCNC: 3.7 MMOL/L (ref 3.5–5)
PROT SERPL-MCNC: 6.3 G/DL (ref 6.4–8.3)
RBC # BLD AUTO: 4.42 M/UL (ref 3.5–5.5)
SODIUM SERPL-SCNC: 144 MMOL/L (ref 132–146)
WBC OTHER # BLD: 7.2 K/UL (ref 4.5–11.5)

## 2023-09-28 PROCEDURE — 2700000000 HC OXYGEN THERAPY PER DAY

## 2023-09-28 PROCEDURE — 36415 COLL VENOUS BLD VENIPUNCTURE: CPT

## 2023-09-28 PROCEDURE — 82962 GLUCOSE BLOOD TEST: CPT

## 2023-09-28 PROCEDURE — 85025 COMPLETE CBC W/AUTO DIFF WBC: CPT

## 2023-09-28 PROCEDURE — 80053 COMPREHEN METABOLIC PANEL: CPT

## 2023-09-28 PROCEDURE — 6360000002 HC RX W HCPCS: Performed by: STUDENT IN AN ORGANIZED HEALTH CARE EDUCATION/TRAINING PROGRAM

## 2023-09-28 PROCEDURE — 94640 AIRWAY INHALATION TREATMENT: CPT

## 2023-09-28 PROCEDURE — 99232 SBSQ HOSP IP/OBS MODERATE 35: CPT | Performed by: INTERNAL MEDICINE

## 2023-09-28 PROCEDURE — 6370000000 HC RX 637 (ALT 250 FOR IP): Performed by: STUDENT IN AN ORGANIZED HEALTH CARE EDUCATION/TRAINING PROGRAM

## 2023-09-28 PROCEDURE — 6370000000 HC RX 637 (ALT 250 FOR IP): Performed by: INTERNAL MEDICINE

## 2023-09-28 PROCEDURE — 94660 CPAP INITIATION&MGMT: CPT

## 2023-09-28 PROCEDURE — 2580000003 HC RX 258: Performed by: STUDENT IN AN ORGANIZED HEALTH CARE EDUCATION/TRAINING PROGRAM

## 2023-09-28 RX ORDER — PANTOPRAZOLE SODIUM 40 MG/1
40 TABLET, DELAYED RELEASE ORAL
Qty: 30 TABLET | Refills: 3 | Status: SHIPPED | OUTPATIENT
Start: 2023-09-29

## 2023-09-28 RX ORDER — GUAIFENESIN 400 MG/1
400 TABLET ORAL 3 TIMES DAILY
Qty: 56 TABLET | Refills: 0 | Status: SHIPPED | OUTPATIENT
Start: 2023-09-28

## 2023-09-28 RX ADMIN — BUPROPION HYDROCHLORIDE 150 MG: 150 TABLET, EXTENDED RELEASE ORAL at 08:34

## 2023-09-28 RX ADMIN — GUAIFENESIN 400 MG: 400 TABLET ORAL at 08:34

## 2023-09-28 RX ADMIN — DULOXETINE HYDROCHLORIDE 60 MG: 60 CAPSULE, DELAYED RELEASE ORAL at 08:34

## 2023-09-28 RX ADMIN — IPRATROPIUM BROMIDE AND ALBUTEROL SULFATE 1 DOSE: .5; 2.5 SOLUTION RESPIRATORY (INHALATION) at 06:42

## 2023-09-28 RX ADMIN — BUDESONIDE INHALATION 500 MCG: 0.5 SUSPENSION RESPIRATORY (INHALATION) at 16:15

## 2023-09-28 RX ADMIN — ARFORMOTEROL TARTRATE 15 MCG: 15 SOLUTION RESPIRATORY (INHALATION) at 06:41

## 2023-09-28 RX ADMIN — ACETAMINOPHEN 650 MG: 325 TABLET ORAL at 08:01

## 2023-09-28 RX ADMIN — ARFORMOTEROL TARTRATE 15 MCG: 15 SOLUTION RESPIRATORY (INHALATION) at 16:14

## 2023-09-28 RX ADMIN — BUDESONIDE INHALATION 500 MCG: 0.5 SUSPENSION RESPIRATORY (INHALATION) at 06:41

## 2023-09-28 RX ADMIN — IPRATROPIUM BROMIDE AND ALBUTEROL SULFATE 1 DOSE: .5; 2.5 SOLUTION RESPIRATORY (INHALATION) at 12:59

## 2023-09-28 RX ADMIN — HEPARIN SODIUM 5000 UNITS: 10000 INJECTION INTRAVENOUS; SUBCUTANEOUS at 05:58

## 2023-09-28 RX ADMIN — Medication 10 ML: at 08:35

## 2023-09-28 RX ADMIN — IPRATROPIUM BROMIDE AND ALBUTEROL SULFATE 1 DOSE: .5; 2.5 SOLUTION RESPIRATORY (INHALATION) at 16:15

## 2023-09-28 RX ADMIN — DEXTROAMPHETAMINE SULFATE, DEXTROAMPHETAMINE SACCHARATE, AMPHETAMINE SULFATE AND AMPHETAMINE ASPARTATE 30 MG: 2.5; 2.5; 2.5; 2.5 CAPSULE, EXTENDED RELEASE ORAL at 08:34

## 2023-09-28 RX ADMIN — ROSUVASTATIN CALCIUM 20 MG: 20 TABLET, FILM COATED ORAL at 08:34

## 2023-09-28 RX ADMIN — PANTOPRAZOLE SODIUM 40 MG: 40 TABLET, DELAYED RELEASE ORAL at 05:58

## 2023-09-28 RX ADMIN — ALLOPURINOL 300 MG: 300 TABLET ORAL at 08:34

## 2023-09-28 ASSESSMENT — PAIN DESCRIPTION - LOCATION: LOCATION: HEAD

## 2023-09-28 ASSESSMENT — PAIN SCALES - GENERAL: PAINLEVEL_OUTOF10: 5

## 2023-09-28 ASSESSMENT — PAIN DESCRIPTION - DESCRIPTORS: DESCRIPTORS: ACHING

## 2023-09-28 NOTE — DISCHARGE SUMMARY
[facility]   Discharge summary   Patient ID:  Moses Garcia  [id number]  59 y.o.  1965    Admit date: 9/18/2023    Discharge date and time: 9/28/2023    Admission Diagnoses:   Patient Active Problem List   Diagnosis    Essential hypertension    COPD (chronic obstructive pulmonary disease) ([address])    ADHD (attention deficit hyperactivity disorder)    Hyperlipidemia    Depression    Gout    Headache    Migraine without aura and without status migrainosus, not intractable    Body mass index (BMI) 45.0-49.9, adult ([address])    Tobacco use    Acute respiratory failure ([address])    Acute respiratory failure with hypoxia ([address] [facility])       Discharge Diagnoses: Rhinovirus    Consults: pulmonary/intensive care    Procedures: None    Hospital Course: The patient is a 59 y.o. female of Kev Wiley DO     who presents to the emergency room for shortness of breath that has been ongoing 2 days prior to arrival.  On arrival to emergency patient was found to be 60% on room air in triage. 9/27/2023  Patient continues on high flow 6 liters nasal canula  Pulmonary following  Discontinue steroids  Sleep study outpatient  Continue bipap HS  Positive Rhinovirus   Supportive care  Obtain walking pulse ox  Encourage ISP      9/28/2023  Patient is medically stable for discharge. Patient needs home O2 on discharge. Patient to follow up with pulmonologist in two weeks. Patient to have [address].     Recent Labs     09/26/23  0559 09/27/23  0533 09/28/23  0548   WBC 6.9 7.7 7.2   HGB 14.6 15.0 14.5   HCT 45.4 45.9 45.4    199 184       Recent Labs     09/26/23  0559 09/27/23  0533 09/28/23  0548    143 144   K 3.7 4.2 3.7    101 101   CO2 35* 32* 32*   BUN 17 22* 23*   CREATININE 0.8 0.9 1.0   CALCIUM 9.4 9.4 9.5       XR CHEST PORTABLE    Result Date: 9/19/2023  EXAMINATION: ONE XRAY VIEW OF THE CHEST 9/19/2023 7:49 am COMPARISON: 09/18/2023 HISTORY: ORDERING SYSTEM PROVIDED HISTORY: SOB TECHNOLOGIST PROVIDED this medication, and follow the directions you see here. * This list has 2 medication(s) that are the same as other medications prescribed for you. Read the directions carefully, and ask your doctor or other care provider to review them with you. CONTINUE taking these medications      allopurinol 300 MG tablet  Commonly known as: ZYLOPRIM  take 1 tablet by mouth once daily     buPROPion 150 MG extended release tablet  Commonly known as: Wellbutrin XL  Take 1 tablet by mouth every morning     DULoxetine 60 MG extended release capsule  Commonly known as: CYMBALTA  take 1 capsule by mouth twice a day     fluticasone-umeclidin-vilant 200-62.5-25 MCG/ACT Aepb inhaler  Commonly known as: TRELEGY ELLIPTA  Inhale 1 puff into the lungs daily     losartan-hydroCHLOROthiazide 100-25 MG per tablet  Commonly known as: HYZAAR  take 1 tablet by mouth once daily     potassium chloride 10 MEQ extended release tablet  Commonly known as: KLOR-CON M  Take 1 tablet by mouth daily     Rimegepant Sulfate 75 MG Tbdp  Take 75 mg by mouth daily as needed (migraine)     rosuvastatin 20 MG tablet  Commonly known as: CRESTOR  Take 1 tablet by mouth daily     turmeric 500 MG Caps     varenicline 1 MG tablet  Commonly known as: Chantix  Take 1 tablet by mouth 2 times daily            STOP taking these medications      gabapentin 100 MG capsule  Commonly known as: NEURONTIN               Where to Get Your Medications        These medications were sent to [address] [place] [address], [address] [place] [address] [phone]  [address] [facility] [address]      Phone: [phone]   guaiFENesin 400 MG tablet  pantoprazole 40 MG tablet       Activity: activity as tolerated  Diet: cardiac diet    Pt has been advised to: Follow-up with Yolanda Nowak DO in 1 week.   Follow-up with consultants as recommended by them    Note that over 30 minutes was spent in preparing discharge papers,

## 2023-09-28 NOTE — CARE COORDINATION
9/28/23 Update CM Note. Pt admitted 9/19/23 Acute respiratory failure with hypoxia. Transferred out of ICU 9/24/23. Pt currently on 6LO2, BIPAP at HS. [facility]  will provide home O2. Need walking pulse ox to determine needs. Discharge plan to return home when medically stable, son will provide transportation. Eve NAILSN RN-BC  [phone] Addendum: Pt qualifies for Home O2 6L/NC.   Sheri Leyva will be provider, tank to be delivered to pt room prior to DC

## 2023-09-28 NOTE — PLAN OF CARE
Problem: Discharge Planning  Goal: Discharge to home or other facility with appropriate resources  9/27/2023 2343 by Alexander Ellsworth RN  Outcome: Progressing     Problem: Respiratory - Adult  Goal: Achieves optimal ventilation and oxygenation  9/27/2023 2343 by Alexander Ellsworth RN  Outcome: Progressing     Problem: ABCDS Injury Assessment  Goal: Absence of physical injury  9/27/2023 2343 by Alexander Ellsworth RN  Outcome: Progressing     Problem: Safety - Adult  Goal: Free from fall injury  9/27/2023 2343 by Alexander Ellsworth RN  Outcome: Progressing     Problem: Skin/Tissue Integrity  Goal: Absence of new skin breakdown  Description: 1. Monitor for areas of redness and/or skin breakdown  2. Assess vascular access sites hourly  3. Every 4-6 hours minimum:  Change oxygen saturation probe site  4. Every 4-6 hours:  If on nasal continuous positive airway pressure, respiratory therapy assess nares and determine need for appliance change or resting period.   9/27/2023 2343 by Alexander Ellsworth RN  Outcome: Progressing

## 2023-09-28 NOTE — PROGRESS NOTES
Pt's pulse ox on room air is 88%. Pt's pulse ox on 6 liters sitting recovery 97%. Pt's pulse ox on room air ambulating 87%.   Pt's pulse ox recovery on 6 liters oxygen 95%

## 2023-09-28 NOTE — DISCHARGE SUMMARY
Pt discharged home  on 6 liters nasal cannula with all belongings and  2 portable oxygen tanks. tele removed. Saline locks removed. Transferred off floor via wheelchair by staff member. Discharge instructions reviewed with pt and son. Home oxygen company at pt's residence per family.

## 2023-09-29 ENCOUNTER — CARE COORDINATION (OUTPATIENT)
Dept: CARE COORDINATION | Age: 58
End: 2023-09-29

## 2023-09-29 NOTE — CARE COORDINATION
[facility] Care Transitions Initial Follow Up Call    Call within 2 business days of discharge: Yes    Care Transition Nurse attempted initial CT outreach leaving Hipaa VM, purpose of call, and my contact. Left reminder to schedule 7 day hosp fu w/ PCP. Plan to review RPM Program.    Patient: April Pro Patient : 1965   MRN: <[id number]>  Reason for Admission: 2023 - 2023 [address] IP. Acute respiratory failure with hypoxia, New O2 6L NC w/ BiPAP @ HS. Discharge Date: 23 RARS: Readmission Risk Score: 10.8  NR  CT    [facility] CLINICAL/ STAFF routed to schedule 7 day hosp fu PCP. PDM: Odell Mariza  [phone]   PMH: Smoker, Migraine, HTN, HLD, COPD. O2 6L NC w/ BiPAP at HS through [employer]. Last Discharge [address]       Date Complaint Diagnosis Description Type Department Provider    23 Shortness of Breath Acute respiratory failure with hypoxia and hypercapnia ([address] [facility]) . .. ED to Hosp-Admission (Discharged) (ADMIT) ASHLEY 7WE Daniel Webb, DO; Philip Party. ..             Abdirahman Brown RN

## 2023-10-03 ENCOUNTER — CARE COORDINATION (OUTPATIENT)
Dept: CARE COORDINATION | Age: 58
End: 2023-10-03

## 2023-10-03 NOTE — CARE COORDINATION
Care Transitions Initial Follow Up Call    Call within 2 business days of discharge: Yes    Care Transition Nurse attempted second initial CT outreach leaving Hipaa VM, purpose of call, my contact info. CTN s/o. Patient: Spencer Alfaro Patient : 1965   MRN: [id number]  Reason for Admission:  2023 - 2023 [facility] IP. Acute respiratory failure with hypoxia, New O2 6L NC w/ BiPAP @ HS. Discharge Date: 23 RARS: Readmission Risk Score: 10.8  NR  CT     PCP 10/4 4:00     PDM: Héctor Lewisle  [phone]   PMH: Smoker, Migraine, HTN, HLD, COPD. O2 6L NC w/ BiPAP at HS through [facility]. Last Discharge [address]       Date Complaint Diagnosis Description Type Department Provider    23 Shortness of Breath Acute respiratory failure with hypoxia and hypercapnia ([address]) . .. ED to Hosp-Admission (Discharged) (ADMIT) ASHLEY Webb DO; Jean Salazar. ..             Florence Isbell RN

## 2023-10-04 ENCOUNTER — OFFICE VISIT (OUTPATIENT)
Dept: FAMILY MEDICINE CLINIC | Age: 58
End: 2023-10-04
Payer: COMMERCIAL

## 2023-10-04 VITALS
BODY MASS INDEX: 45.65 KG/M2 | WEIGHT: 241.8 LBS | DIASTOLIC BLOOD PRESSURE: 78 MMHG | HEIGHT: 61 IN | HEART RATE: 100 BPM | SYSTOLIC BLOOD PRESSURE: 130 MMHG | TEMPERATURE: 98 F | OXYGEN SATURATION: 88 %

## 2023-10-04 DIAGNOSIS — G25.81 RESTLESS LEGS SYNDROME: ICD-10-CM

## 2023-10-04 DIAGNOSIS — G62.89 OTHER POLYNEUROPATHY: ICD-10-CM

## 2023-10-04 DIAGNOSIS — J44.9 CHRONIC OBSTRUCTIVE PULMONARY DISEASE, UNSPECIFIED COPD TYPE (HCC): Primary | ICD-10-CM

## 2023-10-04 DIAGNOSIS — Z09 HOSPITAL DISCHARGE FOLLOW-UP: ICD-10-CM

## 2023-10-04 DIAGNOSIS — R09.02 HYPOXEMIA: ICD-10-CM

## 2023-10-04 PROCEDURE — 99215 OFFICE O/P EST HI 40 MIN: CPT | Performed by: FAMILY MEDICINE

## 2023-10-04 PROCEDURE — 3074F SYST BP LT 130 MM HG: CPT | Performed by: FAMILY MEDICINE

## 2023-10-04 PROCEDURE — 3078F DIAST BP <80 MM HG: CPT | Performed by: FAMILY MEDICINE

## 2023-10-04 PROCEDURE — 1111F DSCHRG MED/CURRENT MED MERGE: CPT | Performed by: FAMILY MEDICINE

## 2023-10-04 RX ORDER — PREGABALIN 50 MG/1
50 CAPSULE ORAL 3 TIMES DAILY
Qty: 90 CAPSULE | Refills: 0 | Status: SHIPPED | OUTPATIENT
Start: 2023-10-04 | End: 2023-11-03

## 2023-10-04 RX ORDER — ALBUTEROL SULFATE 90 UG/1
2 AEROSOL, METERED RESPIRATORY (INHALATION) 4 TIMES DAILY PRN
Qty: 18 G | Refills: 2 | Status: SHIPPED | OUTPATIENT
Start: 2023-10-04

## 2023-10-05 ENCOUNTER — TELEPHONE (OUTPATIENT)
Dept: FAMILY MEDICINE CLINIC | Age: 58
End: 2023-10-05

## 2023-10-05 ASSESSMENT — ENCOUNTER SYMPTOMS
VOMITING: 0
ABDOMINAL PAIN: 0
CONSTIPATION: 0
EYE PAIN: 0
DIARRHEA: 0
TROUBLE SWALLOWING: 0
CHEST TIGHTNESS: 0
COUGH: 0
SORE THROAT: 0
BACK PAIN: 0
WHEEZING: 1
SHORTNESS OF BREATH: 1
SINUS PAIN: 0
NAUSEA: 0

## 2023-10-05 NOTE — PROGRESS NOTES
Post-Discharge Transitional Care Follow Up      Sara Fisher   YOB: 1965    Date of Office Visit:  10/4/2023  Date of Hospital Admission: 9/18/23  Date of Hospital Discharge: 9/28/23  Readmission Risk Score (high >=14%. Medium >=10%):Readmission Risk Score: 10.8      Care management risk score Rising risk (score 2-5) and Complex Care (Scores >=6): No Risk Score On File     Non face to face  following discharge, date last encounter closed (first attempt may have been earlier): 10/03/2023     Call initiated 2 business days of discharge: Yes     Chronic obstructive pulmonary disease, unspecified COPD type ([address])  Comments:  restart trelegy, no work till after follow up in 2 weeks  oxygen levels low rm air, with amulation she dropped to 80%  albuterol rescue inhaler too  Hypoxemia  Comments:  needs to use oxygen more often  5 liter at night, refuses cpap for sleep apnea  use oxygen during the day  Restless legs syndrome  Comments:  gabapentin stopped  try lyrica 50mg tid and see if this helps  side effects advised  Orders:  -     pregabalin (LYRICA) 50 MG capsule; Take 1 capsule by mouth 3 times daily for 30 days. Max Daily Amount: 150 mg, Disp-90 capsule, R-0Normal  Other polyneuropathy  Comments:  try lyrica the gabapentin worked so so but made her very tired  Orders:  -     pregabalin (LYRICA) 50 MG capsule; Take 1 capsule by mouth 3 times daily for 30 days. Max Daily Amount: 150 mg, Disp-90 capsule, R-0Normal  Hospital discharge follow-up  -     ME DISCHARGE MEDS RECONCILED W/ CURRENT OUTPATIENT MED LIST      Medical Decision Making: moderate complexity  Return in 2 weeks (on 10/18/2023). On this date 10/4/2023 I have spent 45 minutes reviewing previous notes, test results and face to face with the patient discussing the diagnosis and importance of compliance with the treatment plan as well as documenting on the day of the visit.        Subjective:   Here with son for hospital follow up  She was

## 2023-10-05 NOTE — TELEPHONE ENCOUNTER
Fifi Argueta a  called from [facility], states that she has not been able to reach [facility] and was going to close out her case. She was wanting to know if there she had been following up w/ PCP. I let her know that she was seen yesterday and has a scheduled follow up appt 10/17/23.     If we have any future concerns we can reach Fifi Argueta by calling [phone] ext [phone]

## 2023-10-11 ENCOUNTER — PATIENT MESSAGE (OUTPATIENT)
Dept: FAMILY MEDICINE CLINIC | Age: 58
End: 2023-10-11

## 2023-10-11 DIAGNOSIS — J44.9 CHRONIC OBSTRUCTIVE PULMONARY DISEASE, UNSPECIFIED COPD TYPE (HCC): Primary | ICD-10-CM

## 2023-10-11 DIAGNOSIS — R09.02 HYPOXEMIA: ICD-10-CM

## 2023-10-17 ENCOUNTER — OFFICE VISIT (OUTPATIENT)
Dept: FAMILY MEDICINE CLINIC | Age: 58
End: 2023-10-17
Payer: COMMERCIAL

## 2023-10-17 VITALS
HEIGHT: 61 IN | TEMPERATURE: 97.9 F | SYSTOLIC BLOOD PRESSURE: 122 MMHG | WEIGHT: 242 LBS | BODY MASS INDEX: 45.69 KG/M2 | OXYGEN SATURATION: 96 % | DIASTOLIC BLOOD PRESSURE: 78 MMHG | HEART RATE: 104 BPM

## 2023-10-17 DIAGNOSIS — F90.0 ATTENTION DEFICIT HYPERACTIVITY DISORDER (ADHD), PREDOMINANTLY INATTENTIVE TYPE: ICD-10-CM

## 2023-10-17 DIAGNOSIS — Z72.0 TOBACCO USE: ICD-10-CM

## 2023-10-17 DIAGNOSIS — G62.89 OTHER POLYNEUROPATHY: ICD-10-CM

## 2023-10-17 DIAGNOSIS — J41.8 MIXED SIMPLE AND MUCOPURULENT CHRONIC BRONCHITIS (HCC): Primary | ICD-10-CM

## 2023-10-17 DIAGNOSIS — Z23 IMMUNIZATION DUE: ICD-10-CM

## 2023-10-17 DIAGNOSIS — G25.81 RESTLESS LEGS SYNDROME: ICD-10-CM

## 2023-10-17 PROCEDURE — 3078F DIAST BP <80 MM HG: CPT | Performed by: FAMILY MEDICINE

## 2023-10-17 PROCEDURE — 90471 IMMUNIZATION ADMIN: CPT | Performed by: FAMILY MEDICINE

## 2023-10-17 PROCEDURE — 90674 CCIIV4 VAC NO PRSV 0.5 ML IM: CPT | Performed by: FAMILY MEDICINE

## 2023-10-17 PROCEDURE — 3074F SYST BP LT 130 MM HG: CPT | Performed by: FAMILY MEDICINE

## 2023-10-17 PROCEDURE — 99214 OFFICE O/P EST MOD 30 MIN: CPT | Performed by: FAMILY MEDICINE

## 2023-10-17 RX ORDER — DEXTROAMPHETAMINE SACCHARATE, AMPHETAMINE ASPARTATE MONOHYDRATE, DEXTROAMPHETAMINE SULFATE AND AMPHETAMINE SULFATE 7.5; 7.5; 7.5; 7.5 MG/1; MG/1; MG/1; MG/1
30 CAPSULE, EXTENDED RELEASE ORAL DAILY
Qty: 30 CAPSULE | Refills: 0 | Status: SHIPPED | OUTPATIENT
Start: 2023-11-24 | End: 2023-12-24

## 2023-10-17 RX ORDER — PREGABALIN 75 MG/1
75 CAPSULE ORAL 3 TIMES DAILY
Qty: 90 CAPSULE | Refills: 1 | Status: SHIPPED | OUTPATIENT
Start: 2023-10-17 | End: 2023-11-16

## 2023-10-17 RX ORDER — DEXTROAMPHETAMINE SACCHARATE, AMPHETAMINE ASPARTATE MONOHYDRATE, DEXTROAMPHETAMINE SULFATE AND AMPHETAMINE SULFATE 7.5; 7.5; 7.5; 7.5 MG/1; MG/1; MG/1; MG/1
30 CAPSULE, EXTENDED RELEASE ORAL DAILY
Qty: 30 CAPSULE | Refills: 0 | Status: SHIPPED | OUTPATIENT
Start: 2023-10-27 | End: 2023-11-26

## 2023-10-17 RX ORDER — GUAIFENESIN 400 MG/1
400 TABLET ORAL 3 TIMES DAILY
Qty: 90 TABLET | Refills: 1 | Status: SHIPPED | OUTPATIENT
Start: 2023-10-17

## 2023-10-17 ASSESSMENT — ENCOUNTER SYMPTOMS
NAUSEA: 0
VOMITING: 0
TROUBLE SWALLOWING: 0
BACK PAIN: 0
EYE PAIN: 0
SORE THROAT: 0
ABDOMINAL PAIN: 0
SHORTNESS OF BREATH: 0
SINUS PAIN: 0
DIARRHEA: 0
CHEST TIGHTNESS: 0
CONSTIPATION: 0
WHEEZING: 0
COUGH: 1

## 2023-10-17 NOTE — PROGRESS NOTES
copd.    Diagnoses and all orders for this visit:    Mixed simple and mucopurulent chronic bronchitis ([address])  Comments:  restarted her trelegy  still on oxygen, 2L now during the day  getting portable for work  Orders:  -     guaiFENesin 400 MG tablet; Take 1 tablet by mouth in the morning, at noon, and at bedtime    Restless legs syndrome  Comments:  cont lyrica, increase to 75mg tid  the 50mg is not quite enough'  still pain and burning in feet  Orders:  -     pregabalin (LYRICA) 75 MG capsule; Take 1 capsule by mouth 3 times daily for 30 days. Max Daily Amount: 225 mg    Other polyneuropathy  Comments:  on lyrica now, increase dose since the 50mg is not doing very much  75mg tid  recheck in 2 months  Orders:  -     pregabalin (LYRICA) 75 MG capsule; Take 1 capsule by mouth 3 times daily for 30 days. Max Daily Amount: 225 mg    Tobacco use  Comments:  back to smoking a few a day  takign the chantix day 3 now  cont, may need the patch but needs to see if chantix if effective    Immunization due  Comments:  flu shot today  get RSV vaccine at the pharmacy  Orders:  -     Influenza, FLUCELVAX, (age 10 mo+), IM, Preservative Free, 0.5 mL    Attention deficit hyperactivity disorder (ADHD), predominantly inattentive type  Comments:  back on the adderall and doing much better  able to work, oarrs reviwed and refill sokay  but now depression is getting worse and that is affecting work  Orders:  -     amphetamine-dextroamphetamine (ADDERALL XR) 30 MG extended release capsule; Take 1 capsule by mouth daily for 30 days. -     amphetamine-dextroamphetamine (ADDERALL XR) 30 MG extended release capsule; Take 1 capsule by mouth daily for 30 days. Max Daily Amount: 30 mg        I independently reviewed and updated the chief complaint, HPI, past medical and surgical history, medications, allergies and ROS as entered by the LPN. Seen by:   Grant Rios DO

## 2023-10-21 DIAGNOSIS — M1A.00X0 IDIOPATHIC CHRONIC GOUT WITHOUT TOPHUS, UNSPECIFIED SITE: ICD-10-CM

## 2023-10-21 DIAGNOSIS — F32.1 CURRENT MODERATE EPISODE OF MAJOR DEPRESSIVE DISORDER WITHOUT PRIOR EPISODE (HCC): ICD-10-CM

## 2023-10-21 DIAGNOSIS — E78.2 MIXED HYPERLIPIDEMIA: ICD-10-CM

## 2023-10-21 DIAGNOSIS — I10 ESSENTIAL HYPERTENSION: ICD-10-CM

## 2023-10-23 RX ORDER — POTASSIUM CHLORIDE 750 MG/1
10 TABLET, EXTENDED RELEASE ORAL DAILY
Qty: 90 TABLET | Refills: 1 | Status: SHIPPED | OUTPATIENT
Start: 2023-10-23

## 2023-10-23 RX ORDER — DULOXETIN HYDROCHLORIDE 60 MG/1
CAPSULE, DELAYED RELEASE ORAL
Qty: 60 CAPSULE | Refills: 5 | Status: SHIPPED | OUTPATIENT
Start: 2023-10-23

## 2023-10-23 RX ORDER — ALLOPURINOL 300 MG/1
TABLET ORAL
Qty: 30 TABLET | Refills: 5 | Status: SHIPPED | OUTPATIENT
Start: 2023-10-23

## 2023-10-23 RX ORDER — ROSUVASTATIN CALCIUM 20 MG/1
20 TABLET, COATED ORAL DAILY
Qty: 30 TABLET | Refills: 5 | Status: SHIPPED | OUTPATIENT
Start: 2023-10-23

## 2023-10-23 RX ORDER — LOSARTAN POTASSIUM AND HYDROCHLOROTHIAZIDE 25; 100 MG/1; MG/1
TABLET ORAL
Qty: 30 TABLET | Refills: 5 | Status: SHIPPED | OUTPATIENT
Start: 2023-10-23

## 2023-11-21 ENCOUNTER — TELEPHONE (OUTPATIENT)
Dept: FAMILY MEDICINE CLINIC | Age: 58
End: 2023-11-21

## 2023-11-21 NOTE — TELEPHONE ENCOUNTER
Son Ana Faulkner called to update you. He works at the same place as his mom and a co worker told him that Maddie Acevedo has had two seizures at work within the last week. The second seizure lasted longer than the first one but he does not know how long they lasted. He was told that Maddie Acevedo was standing at work when seizure occurred. She was shaking and they sat her down. He does not know any more details. He is unsure if mother has brought this to your attention and wanted to make you aware. He has concerns w/ her driving and potentially having a seizure. He has witnessed mom have \"blacking out\" episodes. She will need to sit and be still when this happens and she will be foggy during episode. They typically last a few seconds, she recovers and is fine. Ana Faulkner is not listed on [employer]'s HIPAA form, no protected health information was provided to son.

## 2023-11-21 NOTE — TELEPHONE ENCOUNTER
Will discuss with patient at her appt,  But recommend she really be evaluated in ED if this is really happening.

## 2023-12-13 ENCOUNTER — OFFICE VISIT (OUTPATIENT)
Dept: FAMILY MEDICINE CLINIC | Age: 58
End: 2023-12-13
Payer: COMMERCIAL

## 2023-12-13 VITALS
HEART RATE: 104 BPM | SYSTOLIC BLOOD PRESSURE: 106 MMHG | OXYGEN SATURATION: 92 % | DIASTOLIC BLOOD PRESSURE: 64 MMHG | HEIGHT: 61 IN | BODY MASS INDEX: 46.56 KG/M2 | TEMPERATURE: 97.9 F | WEIGHT: 246.6 LBS

## 2023-12-13 DIAGNOSIS — R06.02 SHORTNESS OF BREATH: ICD-10-CM

## 2023-12-13 DIAGNOSIS — R53.1 WEAKNESS: ICD-10-CM

## 2023-12-13 DIAGNOSIS — J41.8 MIXED SIMPLE AND MUCOPURULENT CHRONIC BRONCHITIS (HCC): ICD-10-CM

## 2023-12-13 DIAGNOSIS — I95.1 ORTHOSTATIC HYPOTENSION: Primary | ICD-10-CM

## 2023-12-13 DIAGNOSIS — M79.10 MYALGIA: ICD-10-CM

## 2023-12-13 PROCEDURE — 3074F SYST BP LT 130 MM HG: CPT | Performed by: FAMILY MEDICINE

## 2023-12-13 PROCEDURE — 99215 OFFICE O/P EST HI 40 MIN: CPT | Performed by: FAMILY MEDICINE

## 2023-12-13 PROCEDURE — 3078F DIAST BP <80 MM HG: CPT | Performed by: FAMILY MEDICINE

## 2023-12-13 ASSESSMENT — ENCOUNTER SYMPTOMS
ABDOMINAL PAIN: 0
WHEEZING: 0
DIARRHEA: 0
SHORTNESS OF BREATH: 0
BACK PAIN: 0
SINUS PAIN: 0
CHEST TIGHTNESS: 0
NAUSEA: 0
EYE PAIN: 0
COUGH: 0
VOMITING: 0
SORE THROAT: 0
CONSTIPATION: 0
TROUBLE SWALLOWING: 0

## 2023-12-13 NOTE — PROGRESS NOTES
12/13/23    Name: Jessie Falk  TGV:[id number]/[id number]   Sex:female   Age:58 y.o. Chief Complaint   Patient presents with    Peripheral Neuropathy    ADHD    COPD     Patient presents to office for visit. She has been having episodes at work where she \"blacks out\" when she is standing. Patient says if feels as if she is just zoned out and then she feels faint, and then she starts shaking. Patient says these episodes only happen while standing and are happening more frequently, they are starting to scare her. Patient says she doesn't know what medications she is taking. She has pain in her thighs, she says it is to the point that she can not go up and down stairs and it hurts to get out a chair. She is tearful. Patient says it feels as if she isn't getting any better. She thinks she may be depressed. She forces herself to go out and do things otherwise she would just sleep all day. Patient is wearing portable oxygen. She is using her concentrator at home.      Here for a recheck  She is back to work  Using her portable concentrator and weaing oxygen    She is having episodes at work where she stands and gets dizzy  Has to catch her self , if this happens and she stands too longs she iwll shake, never loses bowel or bladder control, never completely blacks out'  If she sits down it goes away with in seconds  Has not check bp or sugar at work to see if they are okay  This does not happen at home on the weekends'  She sarkar snot drink hardly any fluids through out the day    Reviewed meds with her  Will stop her losartan/hctz  Change to just losartan 50mg daily  She should monitor bp but likely will not  Will recheck in 6 weeks and see if these episodes get better  Also encouraged 4 water bottles daily    She has not seen DR Daja Crowell yet  Will get referral resent  Refer to pulmonary rehab  She sarkar snot feel her breathing is getting any better  But other then work she is not doing anything  Absolutely no exercise, no walking,

## 2024-01-02 ENCOUNTER — HOSPITAL ENCOUNTER (EMERGENCY)
Age: 59
Discharge: ANOTHER HOSPITAL | End: 2024-01-03
Attending: EMERGENCY MEDICINE
Payer: COMMERCIAL

## 2024-01-02 ENCOUNTER — APPOINTMENT (OUTPATIENT)
Dept: CT IMAGING | Age: 59
End: 2024-01-02
Payer: COMMERCIAL

## 2024-01-02 ENCOUNTER — APPOINTMENT (OUTPATIENT)
Dept: GENERAL RADIOLOGY | Age: 59
End: 2024-01-02
Payer: COMMERCIAL

## 2024-01-02 DIAGNOSIS — R59.0 MEDIASTINAL LYMPHADENOPATHY: ICD-10-CM

## 2024-01-02 DIAGNOSIS — J96.02 ACUTE RESPIRATORY FAILURE WITH HYPOXIA AND HYPERCAPNIA (HCC): Primary | ICD-10-CM

## 2024-01-02 DIAGNOSIS — J44.1 COPD EXACERBATION (HCC): ICD-10-CM

## 2024-01-02 DIAGNOSIS — J96.01 ACUTE RESPIRATORY FAILURE WITH HYPOXIA AND HYPERCAPNIA (HCC): Primary | ICD-10-CM

## 2024-01-02 LAB
ALBUMIN SERPL-MCNC: 3.9 G/DL (ref 3.5–5.2)
ALP SERPL-CCNC: 156 U/L (ref 35–104)
ALT SERPL-CCNC: 18 U/L (ref 0–32)
AMYLASE SERPL-CCNC: 41 U/L (ref 20–100)
ANION GAP SERPL CALCULATED.3IONS-SCNC: 10 MMOL/L (ref 7–16)
AST SERPL-CCNC: 16 U/L (ref 0–31)
B PARAP IS1001 DNA NPH QL NAA+NON-PROBE: NOT DETECTED
B PERT DNA SPEC QL NAA+PROBE: NOT DETECTED
BASOPHILS # BLD: 0.03 K/UL (ref 0–0.2)
BASOPHILS NFR BLD: 0 % (ref 0–2)
BILIRUB SERPL-MCNC: 0.5 MG/DL (ref 0–1.2)
BILIRUB UR QL STRIP: ABNORMAL
BNP SERPL-MCNC: 495 PG/ML (ref 0–125)
BUN SERPL-MCNC: 9 MG/DL (ref 6–23)
C PNEUM DNA NPH QL NAA+NON-PROBE: NOT DETECTED
CALCIUM SERPL-MCNC: 10 MG/DL (ref 8.6–10.2)
CHLORIDE SERPL-SCNC: 98 MMOL/L (ref 98–107)
CLARITY UR: CLEAR
CO2 SERPL-SCNC: 34 MMOL/L (ref 22–29)
COLOR UR: YELLOW
CREAT SERPL-MCNC: 0.7 MG/DL (ref 0.5–1)
EKG ATRIAL RATE: 104 BPM
EKG P AXIS: 87 DEGREES
EKG P-R INTERVAL: 134 MS
EKG Q-T INTERVAL: 366 MS
EKG QRS DURATION: 78 MS
EKG QTC CALCULATION (BAZETT): 481 MS
EKG R AXIS: 76 DEGREES
EKG T AXIS: 61 DEGREES
EKG VENTRICULAR RATE: 104 BPM
EOSINOPHIL # BLD: 0.11 K/UL (ref 0.05–0.5)
EOSINOPHILS RELATIVE PERCENT: 1 % (ref 0–6)
EPI CELLS #/AREA URNS HPF: ABNORMAL /HPF
ERYTHROCYTE [DISTWIDTH] IN BLOOD BY AUTOMATED COUNT: 14.1 % (ref 11.5–15)
FLUAV RNA NPH QL NAA+NON-PROBE: NOT DETECTED
FLUAV RNA RESP QL NAA+PROBE: NOT DETECTED
FLUBV RNA NPH QL NAA+NON-PROBE: NOT DETECTED
FLUBV RNA RESP QL NAA+PROBE: NOT DETECTED
GFR SERPL CREATININE-BSD FRML MDRD: >60 ML/MIN/1.73M2
GLUCOSE SERPL-MCNC: 117 MG/DL (ref 74–99)
GLUCOSE UR STRIP-MCNC: NEGATIVE MG/DL
HADV DNA NPH QL NAA+NON-PROBE: NOT DETECTED
HCOV 229E RNA NPH QL NAA+NON-PROBE: NOT DETECTED
HCOV HKU1 RNA NPH QL NAA+NON-PROBE: NOT DETECTED
HCOV NL63 RNA NPH QL NAA+NON-PROBE: NOT DETECTED
HCOV OC43 RNA NPH QL NAA+NON-PROBE: NOT DETECTED
HCT VFR BLD AUTO: 48.6 % (ref 34–48)
HGB BLD-MCNC: 16.1 G/DL (ref 11.5–15.5)
HGB UR QL STRIP.AUTO: ABNORMAL
HMPV RNA NPH QL NAA+NON-PROBE: NOT DETECTED
HPIV1 RNA NPH QL NAA+NON-PROBE: NOT DETECTED
HPIV2 RNA NPH QL NAA+NON-PROBE: NOT DETECTED
HPIV3 RNA NPH QL NAA+NON-PROBE: NOT DETECTED
HPIV4 RNA NPH QL NAA+NON-PROBE: NOT DETECTED
IMM GRANULOCYTES # BLD AUTO: 0.03 K/UL (ref 0–0.58)
IMM GRANULOCYTES NFR BLD: 0 % (ref 0–5)
INR PPP: 1.1
KETONES UR STRIP-MCNC: NEGATIVE MG/DL
LACTATE BLDV-SCNC: 1.4 MMOL/L (ref 0.5–1.9)
LEUKOCYTE ESTERASE UR QL STRIP: NEGATIVE
LIPASE SERPL-CCNC: 11 U/L (ref 13–60)
LYMPHOCYTES NFR BLD: 0.87 K/UL (ref 1.5–4)
LYMPHOCYTES RELATIVE PERCENT: 11 % (ref 20–42)
M PNEUMO DNA NPH QL NAA+NON-PROBE: NOT DETECTED
MCH RBC QN AUTO: 33.1 PG (ref 26–35)
MCHC RBC AUTO-ENTMCNC: 33.1 G/DL (ref 32–34.5)
MCV RBC AUTO: 100 FL (ref 80–99.9)
MONOCYTES NFR BLD: 0.48 K/UL (ref 0.1–0.95)
MONOCYTES NFR BLD: 6 % (ref 2–12)
NEUTROPHILS NFR BLD: 82 % (ref 43–80)
NEUTS SEG NFR BLD: 6.71 K/UL (ref 1.8–7.3)
NITRITE UR QL STRIP: NEGATIVE
O2 DELIVERY DEVICE: ABNORMAL
PARTIAL THROMBOPLASTIN TIME: 33.6 SEC (ref 24.5–35.1)
PH UR STRIP: 6.5 [PH] (ref 5–9)
PLATELET # BLD AUTO: 221 K/UL (ref 130–450)
PMV BLD AUTO: 10 FL (ref 7–12)
POC HCO3: 37.6 MMOL/L (ref 22–26)
POC O2 SATURATION: 99.9 % (ref 92–98.5)
POC PCO2: 63.7 MM HG (ref 35–45)
POC PH: 7.38 (ref 7.35–7.45)
POC PO2: 335.5 MM HG (ref 60–80)
POSITIVE BASE EXCESS, ART: 7.8 MMOL/L (ref 0–3)
POTASSIUM SERPL-SCNC: 4 MMOL/L (ref 3.5–5)
PROT SERPL-MCNC: 7.7 G/DL (ref 6.4–8.3)
PROT UR STRIP-MCNC: >=300 MG/DL
PROTHROMBIN TIME: 12.1 SEC (ref 9.3–12.4)
RBC # BLD AUTO: 4.86 M/UL (ref 3.5–5.5)
RBC #/AREA URNS HPF: ABNORMAL /HPF
RSV BY PCR: NOT DETECTED
RSV RNA NPH QL NAA+NON-PROBE: NOT DETECTED
RV+EV RNA NPH QL NAA+NON-PROBE: NOT DETECTED
SAMPLE SITE: ABNORMAL
SARS-COV-2 RNA NPH QL NAA+NON-PROBE: NOT DETECTED
SARS-COV-2 RNA RESP QL NAA+PROBE: NOT DETECTED
SODIUM SERPL-SCNC: 142 MMOL/L (ref 132–146)
SOURCE: NORMAL
SP GR UR STRIP: 1.02 (ref 1–1.03)
SPECIMEN DESCRIPTION: NORMAL
SPECIMEN DESCRIPTION: NORMAL
SPECIMEN SOURCE: NORMAL
TROPONIN I SERPL HS-MCNC: 6 NG/L (ref 0–9)
TROPONIN I SERPL HS-MCNC: 7 NG/L (ref 0–9)
UROBILINOGEN UR STRIP-ACNC: 2 EU/DL (ref 0–1)
WBC #/AREA URNS HPF: ABNORMAL /HPF
WBC OTHER # BLD: 8.2 K/UL (ref 4.5–11.5)

## 2024-01-02 PROCEDURE — 0202U NFCT DS 22 TRGT SARS-COV-2: CPT

## 2024-01-02 PROCEDURE — 71275 CT ANGIOGRAPHY CHEST: CPT

## 2024-01-02 PROCEDURE — 93005 ELECTROCARDIOGRAM TRACING: CPT | Performed by: EMERGENCY MEDICINE

## 2024-01-02 PROCEDURE — 6360000002 HC RX W HCPCS: Performed by: EMERGENCY MEDICINE

## 2024-01-02 PROCEDURE — 96374 THER/PROPH/DIAG INJ IV PUSH: CPT

## 2024-01-02 PROCEDURE — 71045 X-RAY EXAM CHEST 1 VIEW: CPT

## 2024-01-02 PROCEDURE — 82803 BLOOD GASES ANY COMBINATION: CPT

## 2024-01-02 PROCEDURE — 83880 ASSAY OF NATRIURETIC PEPTIDE: CPT

## 2024-01-02 PROCEDURE — 6370000000 HC RX 637 (ALT 250 FOR IP): Performed by: STUDENT IN AN ORGANIZED HEALTH CARE EDUCATION/TRAINING PROGRAM

## 2024-01-02 PROCEDURE — 6360000004 HC RX CONTRAST MEDICATION: Performed by: RADIOLOGY

## 2024-01-02 PROCEDURE — 87634 RSV DNA/RNA AMP PROBE: CPT

## 2024-01-02 PROCEDURE — 80053 COMPREHEN METABOLIC PANEL: CPT

## 2024-01-02 PROCEDURE — 87086 URINE CULTURE/COLONY COUNT: CPT

## 2024-01-02 PROCEDURE — 81001 URINALYSIS AUTO W/SCOPE: CPT

## 2024-01-02 PROCEDURE — 84484 ASSAY OF TROPONIN QUANT: CPT

## 2024-01-02 PROCEDURE — 6370000000 HC RX 637 (ALT 250 FOR IP): Performed by: EMERGENCY MEDICINE

## 2024-01-02 PROCEDURE — 85730 THROMBOPLASTIN TIME PARTIAL: CPT

## 2024-01-02 PROCEDURE — 85025 COMPLETE CBC W/AUTO DIFF WBC: CPT

## 2024-01-02 PROCEDURE — 93010 ELECTROCARDIOGRAM REPORT: CPT | Performed by: INTERNAL MEDICINE

## 2024-01-02 PROCEDURE — 83690 ASSAY OF LIPASE: CPT

## 2024-01-02 PROCEDURE — 99285 EMERGENCY DEPT VISIT HI MDM: CPT

## 2024-01-02 PROCEDURE — 2580000003 HC RX 258: Performed by: EMERGENCY MEDICINE

## 2024-01-02 PROCEDURE — 83605 ASSAY OF LACTIC ACID: CPT

## 2024-01-02 PROCEDURE — 87040 BLOOD CULTURE FOR BACTERIA: CPT

## 2024-01-02 PROCEDURE — 82150 ASSAY OF AMYLASE: CPT

## 2024-01-02 PROCEDURE — 87636 SARSCOV2 & INF A&B AMP PRB: CPT

## 2024-01-02 PROCEDURE — 85610 PROTHROMBIN TIME: CPT

## 2024-01-02 RX ORDER — ACETAMINOPHEN 500 MG
1000 TABLET ORAL ONCE
Status: COMPLETED | OUTPATIENT
Start: 2024-01-02 | End: 2024-01-02

## 2024-01-02 RX ORDER — GUAIFENESIN 400 MG/1
400 TABLET ORAL 3 TIMES DAILY
Status: DISCONTINUED | OUTPATIENT
Start: 2024-01-02 | End: 2024-01-03 | Stop reason: HOSPADM

## 2024-01-02 RX ORDER — ALLOPURINOL 300 MG/1
300 TABLET ORAL DAILY
Status: DISCONTINUED | OUTPATIENT
Start: 2024-01-02 | End: 2024-01-03 | Stop reason: HOSPADM

## 2024-01-02 RX ORDER — ROSUVASTATIN CALCIUM 20 MG/1
20 TABLET, COATED ORAL DAILY
Status: DISCONTINUED | OUTPATIENT
Start: 2024-01-02 | End: 2024-01-03 | Stop reason: HOSPADM

## 2024-01-02 RX ORDER — LOSARTAN POTASSIUM 25 MG/1
50 TABLET ORAL DAILY
Status: DISCONTINUED | OUTPATIENT
Start: 2024-01-02 | End: 2024-01-03 | Stop reason: HOSPADM

## 2024-01-02 RX ORDER — POTASSIUM CHLORIDE 750 MG/1
10 TABLET, EXTENDED RELEASE ORAL ONCE
Status: COMPLETED | OUTPATIENT
Start: 2024-01-02 | End: 2024-01-02

## 2024-01-02 RX ORDER — BUPROPION HYDROCHLORIDE 150 MG/1
150 TABLET ORAL DAILY
Status: DISCONTINUED | OUTPATIENT
Start: 2024-01-02 | End: 2024-01-03 | Stop reason: HOSPADM

## 2024-01-02 RX ORDER — PREGABALIN 75 MG/1
75 CAPSULE ORAL 3 TIMES DAILY
Status: DISCONTINUED | OUTPATIENT
Start: 2024-01-02 | End: 2024-01-03 | Stop reason: HOSPADM

## 2024-01-02 RX ORDER — IPRATROPIUM BROMIDE AND ALBUTEROL SULFATE 2.5; .5 MG/3ML; MG/3ML
1 SOLUTION RESPIRATORY (INHALATION)
Status: DISCONTINUED | OUTPATIENT
Start: 2024-01-02 | End: 2024-01-03 | Stop reason: HOSPADM

## 2024-01-02 RX ORDER — DULOXETIN HYDROCHLORIDE 60 MG/1
60 CAPSULE, DELAYED RELEASE ORAL 2 TIMES DAILY
Status: DISCONTINUED | OUTPATIENT
Start: 2024-01-02 | End: 2024-01-03 | Stop reason: HOSPADM

## 2024-01-02 RX ADMIN — ACETAMINOPHEN 1000 MG: 500 TABLET ORAL at 09:24

## 2024-01-02 RX ADMIN — IPRATROPIUM BROMIDE AND ALBUTEROL SULFATE 1 DOSE: .5; 3 SOLUTION RESPIRATORY (INHALATION) at 09:50

## 2024-01-02 RX ADMIN — IPRATROPIUM BROMIDE AND ALBUTEROL SULFATE 1 DOSE: .5; 3 SOLUTION RESPIRATORY (INHALATION) at 08:17

## 2024-01-02 RX ADMIN — BUPROPION HYDROCHLORIDE 150 MG: 150 TABLET, FILM COATED, EXTENDED RELEASE ORAL at 20:18

## 2024-01-02 RX ADMIN — GUAIFENESIN 400 MG: 400 TABLET ORAL at 20:19

## 2024-01-02 RX ADMIN — ROSUVASTATIN 20 MG: 20 TABLET, FILM COATED ORAL at 20:21

## 2024-01-02 RX ADMIN — WATER 125 MG: 1 INJECTION INTRAMUSCULAR; INTRAVENOUS; SUBCUTANEOUS at 08:17

## 2024-01-02 RX ADMIN — DULOXETINE HYDROCHLORIDE 60 MG: 60 CAPSULE, DELAYED RELEASE ORAL at 20:20

## 2024-01-02 RX ADMIN — ALLOPURINOL 300 MG: 300 TABLET ORAL at 20:19

## 2024-01-02 RX ADMIN — IOPAMIDOL 75 ML: 755 INJECTION, SOLUTION INTRAVENOUS at 09:49

## 2024-01-02 RX ADMIN — POTASSIUM CHLORIDE 10 MEQ: 750 TABLET, EXTENDED RELEASE ORAL at 19:29

## 2024-01-02 RX ADMIN — PREGABALIN 75 MG: 75 CAPSULE ORAL at 20:20

## 2024-01-02 ASSESSMENT — PAIN SCALES - GENERAL
PAINLEVEL_OUTOF10: 8
PAINLEVEL_OUTOF10: 0

## 2024-01-02 ASSESSMENT — LIFESTYLE VARIABLES
HOW OFTEN DO YOU HAVE A DRINK CONTAINING ALCOHOL: NEVER
HOW MANY STANDARD DRINKS CONTAINING ALCOHOL DO YOU HAVE ON A TYPICAL DAY: PATIENT DOES NOT DRINK

## 2024-01-02 ASSESSMENT — PAIN DESCRIPTION - LOCATION: LOCATION: HEAD

## 2024-01-02 NOTE — ED PROVIDER NOTES
[facility] EMERGENCY DEPARTMENT  EMERGENCY DEPARTMENT ENCOUNTER        Pt Name: Tamiko Ch  MRN: [id number]  Birthdate 1965  Date of evaluation: 1/2/2024  Provider: Melissa Saenz DO  PCP: Marli Patel DO  Note Started: 7:40 AM EST 1/2/24    CHIEF COMPLAINT       Chief Complaint   Patient presents with    Shortness of Breath     Patient on 5lpm o2 at home. SOB started around 2-3 days ago. Coughing up pink purulent sputum.       HISTORY OF PRESENT ILLNESS: 1 or more Elements   History From: patient    Limitations to history : None    Tamiko Ch is a 58 y.o. female who presents with cough with blood-tinged yellow sputum and shortness of breath beginning 4-5 days ago.  She is on chronic home 4 to 5 L nasal cannula oxygen and was at 80% O2 sat this morning.  Patient has history of COPD and admits she still smokes.  She denies chest pain, lower extremity edema, fever or chills, GI symptoms.  The complaint has been persistent, moderate in severity, and worsened by nothing.  Patient denies fever/chills, sore throat, chest pain, edema, headache, visual disturbances, focal paresthesias, focal weakness, abdominal pain, nausea, vomiting, diarrhea, constipation, dysuria, hematuria, trauma, neck or back pain, rash or other complaints.        Nursing Notes were all reviewed and agreed with or any disagreements were addressed in the HPI.    REVIEW OF SYSTEMS :           Positives and Pertinent negatives as per HPI.     SURGICAL HISTORY     Past Surgical History:   Procedure Laterality Date    TUBAL LIGATION         CURRENTMEDICATIONS       Previous Medications    ALBUTEROL SULFATE HFA (VENTOLIN HFA) 108 (90 BASE) MCG/ACT INHALER    Inhale 2 puffs into the lungs 4 times daily as needed for Wheezing    ALLOPURINOL (ZYLOPRIM) 300 MG TABLET    take 1 tablet by mouth once daily    AMPHETAMINE-DEXTROAMPHETAMINE (ADDERALL XR) 30 MG EXTENDED RELEASE CAPSULE    Take 1 capsule by mouth  0.5 0.0 - 1.2 mg/dL    Alkaline Phosphatase 156 (H) 35 - 104 U/L    ALT 18 0 - 32 U/L    AST 16 0 - 31 U/L   Troponin   Result Value Ref Range    Troponin, High Sensitivity 6 0 - 9 ng/L   Brain Natriuretic Peptide   Result Value Ref Range    Pro- (H) 0 - 125 pg/mL   POCT blood gases   Result Value Ref Range    POC pH 7.379 7.350 - 7.450    POC pCO2 63.7 (H) 35.0 - 45.0 mm Hg    POC PO2 335.5 (H) 60.0 - 80.0 mm Hg    POC HCO3 37.6 (H) 22.0 - 26.0 mmol/L    Positive Base Excess, Art 7.8 (H) 0.0 - 3.0 mmol/L    POC O2 SAT 99.9 (H) 92.0 - 98.5 %    O2 Delivery Device BIPAP     Sample Site Right Radial Artery    EKG 12 lead   Result Value Ref Range    Ventricular Rate 104 BPM    Atrial Rate 104 BPM    P-R Interval 134 ms    QRS Duration 78 ms    Q-T Interval 366 ms    QTc Calculation (Bazett) 481 ms    P Axis 87 degrees    R Axis 76 degrees    T Axis 61 degrees       As interpreted by me, the above displayed labs are abnormal as marked by (H) or (L). All other labs obtained during this visit were within normal range or not returned as of this dictation.        EKG Interpretation  Interpreted by emergency department physician, Melissa Saenz DO  Time: 08:17 Am EDT  Rhythm: sinus tachycardia  Rate: 104  Axis: normal  Conduction: normal  ST Segments: no acute change  T Waves: no acute change  Clinical Impression: sinus tachycardia  Comparison to prior EKG: stable as compared to previous EKG          RADIOLOGY:   Non-plain film images such as CT, Ultrasound, Xray and MRI are read by the radiologist.   Interpretation per the Radiologist below, if available at the time of this note:    CTA PULMONARY W CONTRAST   Final Result   No evidence of pulmonary embolism .      Scattered areas of left-sided atelectasis.  There is a small amount of   bilateral interstitial scarring.      Nonspecific small paratracheal mediastinal lymph nodes.  Follow-up in 6   months would be of value         XR CHEST PORTABLE   Final Result   No

## 2024-01-02 NOTE — ED NOTES
[id number]: Patient presents with very labored breathing, coughing up pink tinged thick cream/yellow sputum. MD called to bedside. Bipap brought to bedside. Patient moved to room 15. Bipap initiated. IV's x 2 started. Labs/ABGs/Blood cultures x 2 obtained. Purewick placed and patient instructed to give urine when possible. Meds given. Patient tolerated well. Breathing somewhat improved, still labored.

## 2024-01-03 ENCOUNTER — HOSPITAL ENCOUNTER (INPATIENT)
Age: 59
LOS: 2 days | Discharge: HOME | End: 2024-01-05
Attending: INTERNAL MEDICINE | Admitting: STUDENT IN AN ORGANIZED HEALTH CARE EDUCATION/TRAINING PROGRAM
Payer: COMMERCIAL

## 2024-01-03 VITALS
WEIGHT: 240 LBS | SYSTOLIC BLOOD PRESSURE: 132 MMHG | DIASTOLIC BLOOD PRESSURE: 71 MMHG | HEART RATE: 83 BPM | BODY MASS INDEX: 45.35 KG/M2 | OXYGEN SATURATION: 99 % | TEMPERATURE: 97.8 F | RESPIRATION RATE: 20 BRPM

## 2024-01-03 DIAGNOSIS — R06.02 SHORTNESS OF BREATH: ICD-10-CM

## 2024-01-03 DIAGNOSIS — J44.9 CHRONIC OBSTRUCTIVE PULMONARY DISEASE, UNSPECIFIED COPD TYPE (HCC): ICD-10-CM

## 2024-01-03 PROBLEM — J44.1 ACUTE EXACERBATION OF CHRONIC OBSTRUCTIVE PULMONARY DISEASE (COPD) (HCC): Status: ACTIVE | Noted: 2024-01-03

## 2024-01-03 PROBLEM — J96.22 ACUTE ON CHRONIC RESPIRATORY FAILURE WITH HYPERCAPNIA (HCC): Status: ACTIVE | Noted: 2024-01-03

## 2024-01-03 LAB
ALBUMIN SERPL-MCNC: 3.7 G/DL (ref 3.5–5.2)
ALP SERPL-CCNC: 151 U/L (ref 35–104)
ALT SERPL-CCNC: 18 U/L (ref 0–32)
ANION GAP SERPL CALCULATED.3IONS-SCNC: 12 MMOL/L (ref 7–16)
AST SERPL-CCNC: 15 U/L (ref 0–31)
BASOPHILS # BLD: 0.02 K/UL (ref 0–0.2)
BASOPHILS NFR BLD: 0 % (ref 0–2)
BILIRUB SERPL-MCNC: 0.3 MG/DL (ref 0–1.2)
BUN SERPL-MCNC: 21 MG/DL (ref 6–23)
CALCIUM SERPL-MCNC: 9.6 MG/DL (ref 8.6–10.2)
CHLORIDE SERPL-SCNC: 99 MMOL/L (ref 98–107)
CO2 SERPL-SCNC: 30 MMOL/L (ref 22–29)
CREAT SERPL-MCNC: 0.8 MG/DL (ref 0.5–1)
EOSINOPHIL # BLD: 0.01 K/UL (ref 0.05–0.5)
EOSINOPHILS RELATIVE PERCENT: 0 % (ref 0–6)
ERYTHROCYTE [DISTWIDTH] IN BLOOD BY AUTOMATED COUNT: 14.3 % (ref 11.5–15)
GFR SERPL CREATININE-BSD FRML MDRD: >60 ML/MIN/1.73M2
GLUCOSE SERPL-MCNC: 102 MG/DL (ref 74–99)
HCT VFR BLD AUTO: 47 % (ref 34–48)
HGB BLD-MCNC: 15.3 G/DL (ref 11.5–15.5)
IMM GRANULOCYTES # BLD AUTO: 0.04 K/UL (ref 0–0.58)
IMM GRANULOCYTES NFR BLD: 1 % (ref 0–5)
LYMPHOCYTES NFR BLD: 1.2 K/UL (ref 1.5–4)
LYMPHOCYTES RELATIVE PERCENT: 14 % (ref 20–42)
MCH RBC QN AUTO: 33.2 PG (ref 26–35)
MCHC RBC AUTO-ENTMCNC: 32.6 G/DL (ref 32–34.5)
MCV RBC AUTO: 102 FL (ref 80–99.9)
MICROORGANISM SPEC CULT: ABNORMAL
MONOCYTES NFR BLD: 0.64 K/UL (ref 0.1–0.95)
MONOCYTES NFR BLD: 7 % (ref 2–12)
NEUTROPHILS NFR BLD: 78 % (ref 43–80)
NEUTS SEG NFR BLD: 6.81 K/UL (ref 1.8–7.3)
PLATELET # BLD AUTO: 246 K/UL (ref 130–450)
PMV BLD AUTO: 10 FL (ref 7–12)
POTASSIUM SERPL-SCNC: 4.1 MMOL/L (ref 3.5–5)
PROCALCITONIN SERPL-MCNC: 0.05 NG/ML (ref 0–0.08)
PROT SERPL-MCNC: 7.1 G/DL (ref 6.4–8.3)
RBC # BLD AUTO: 4.61 M/UL (ref 3.5–5.5)
SODIUM SERPL-SCNC: 141 MMOL/L (ref 132–146)
SPECIMEN DESCRIPTION: ABNORMAL
WBC OTHER # BLD: 8.7 K/UL (ref 4.5–11.5)

## 2024-01-03 PROCEDURE — 36415 COLL VENOUS BLD VENIPUNCTURE: CPT

## 2024-01-03 PROCEDURE — 2700000000 HC OXYGEN THERAPY PER DAY

## 2024-01-03 PROCEDURE — 6360000002 HC RX W HCPCS: Performed by: STUDENT IN AN ORGANIZED HEALTH CARE EDUCATION/TRAINING PROGRAM

## 2024-01-03 PROCEDURE — 87070 CULTURE OTHR SPECIMN AEROBIC: CPT

## 2024-01-03 PROCEDURE — 2060000000 HC ICU INTERMEDIATE R&B

## 2024-01-03 PROCEDURE — 85025 COMPLETE CBC W/AUTO DIFF WBC: CPT

## 2024-01-03 PROCEDURE — 80053 COMPREHEN METABOLIC PANEL: CPT

## 2024-01-03 PROCEDURE — 99221 1ST HOSP IP/OBS SF/LOW 40: CPT | Performed by: STUDENT IN AN ORGANIZED HEALTH CARE EDUCATION/TRAINING PROGRAM

## 2024-01-03 PROCEDURE — 5A09357 ASSISTANCE WITH RESPIRATORY VENTILATION, LESS THAN 24 CONSECUTIVE HOURS, CONTINUOUS POSITIVE AIRWAY PRESSURE: ICD-10-PCS | Performed by: STUDENT IN AN ORGANIZED HEALTH CARE EDUCATION/TRAINING PROGRAM

## 2024-01-03 PROCEDURE — 82785 ASSAY OF IGE: CPT

## 2024-01-03 PROCEDURE — 6370000000 HC RX 637 (ALT 250 FOR IP): Performed by: STUDENT IN AN ORGANIZED HEALTH CARE EDUCATION/TRAINING PROGRAM

## 2024-01-03 PROCEDURE — 6360000002 HC RX W HCPCS: Performed by: INTERNAL MEDICINE

## 2024-01-03 PROCEDURE — 94660 CPAP INITIATION&MGMT: CPT

## 2024-01-03 PROCEDURE — 87205 SMEAR GRAM STAIN: CPT

## 2024-01-03 PROCEDURE — 2580000003 HC RX 258: Performed by: STUDENT IN AN ORGANIZED HEALTH CARE EDUCATION/TRAINING PROGRAM

## 2024-01-03 PROCEDURE — 6370000000 HC RX 637 (ALT 250 FOR IP): Performed by: INTERNAL MEDICINE

## 2024-01-03 PROCEDURE — 2580000003 HC RX 258: Performed by: INTERNAL MEDICINE

## 2024-01-03 PROCEDURE — 94640 AIRWAY INHALATION TREATMENT: CPT

## 2024-01-03 PROCEDURE — 84145 PROCALCITONIN (PCT): CPT

## 2024-01-03 PROCEDURE — 2580000003 HC RX 258

## 2024-01-03 RX ORDER — POTASSIUM CHLORIDE 20 MEQ/1
40 TABLET, EXTENDED RELEASE ORAL PRN
Status: DISCONTINUED | OUTPATIENT
Start: 2024-01-03 | End: 2024-01-05 | Stop reason: HOSPADM

## 2024-01-03 RX ORDER — SODIUM CHLORIDE 9 MG/ML
INJECTION, SOLUTION INTRAVENOUS CONTINUOUS
Status: DISCONTINUED | OUTPATIENT
Start: 2024-01-03 | End: 2024-01-05

## 2024-01-03 RX ORDER — ALLOPURINOL 300 MG/1
300 TABLET ORAL DAILY
Status: DISCONTINUED | OUTPATIENT
Start: 2024-01-03 | End: 2024-01-05 | Stop reason: HOSPADM

## 2024-01-03 RX ORDER — DEXTROAMPHETAMINE SACCHARATE, AMPHETAMINE ASPARTATE MONOHYDRATE, DEXTROAMPHETAMINE SULFATE AND AMPHETAMINE SULFATE 7.5; 7.5; 7.5; 7.5 MG/1; MG/1; MG/1; MG/1
30 CAPSULE, EXTENDED RELEASE ORAL DAILY
Status: DISCONTINUED | OUTPATIENT
Start: 2024-01-03 | End: 2024-01-05 | Stop reason: HOSPADM

## 2024-01-03 RX ORDER — LOSARTAN POTASSIUM 50 MG/1
50 TABLET ORAL DAILY
Status: DISCONTINUED | OUTPATIENT
Start: 2024-01-03 | End: 2024-01-05 | Stop reason: HOSPADM

## 2024-01-03 RX ORDER — ARFORMOTEROL TARTRATE 15 UG/2ML
15 SOLUTION RESPIRATORY (INHALATION)
Status: DISCONTINUED | OUTPATIENT
Start: 2024-01-03 | End: 2024-01-05 | Stop reason: HOSPADM

## 2024-01-03 RX ORDER — PREGABALIN 75 MG/1
75 CAPSULE ORAL 3 TIMES DAILY
Status: DISCONTINUED | OUTPATIENT
Start: 2024-01-03 | End: 2024-01-05 | Stop reason: HOSPADM

## 2024-01-03 RX ORDER — ONDANSETRON 2 MG/ML
4 INJECTION INTRAMUSCULAR; INTRAVENOUS EVERY 6 HOURS PRN
Status: DISCONTINUED | OUTPATIENT
Start: 2024-01-03 | End: 2024-01-05 | Stop reason: HOSPADM

## 2024-01-03 RX ORDER — ROSUVASTATIN CALCIUM 20 MG/1
20 TABLET, COATED ORAL DAILY
Status: DISCONTINUED | OUTPATIENT
Start: 2024-01-03 | End: 2024-01-05 | Stop reason: HOSPADM

## 2024-01-03 RX ORDER — DULOXETIN HYDROCHLORIDE 60 MG/1
60 CAPSULE, DELAYED RELEASE ORAL 2 TIMES DAILY
Status: DISCONTINUED | OUTPATIENT
Start: 2024-01-03 | End: 2024-01-05 | Stop reason: HOSPADM

## 2024-01-03 RX ORDER — ENOXAPARIN SODIUM 100 MG/ML
30 INJECTION SUBCUTANEOUS 2 TIMES DAILY
Status: DISCONTINUED | OUTPATIENT
Start: 2024-01-03 | End: 2024-01-05 | Stop reason: HOSPADM

## 2024-01-03 RX ORDER — ACETAMINOPHEN 650 MG/1
650 SUPPOSITORY RECTAL EVERY 6 HOURS PRN
Status: DISCONTINUED | OUTPATIENT
Start: 2024-01-03 | End: 2024-01-05 | Stop reason: HOSPADM

## 2024-01-03 RX ORDER — METHYLPREDNISOLONE SODIUM SUCCINATE 125 MG/2ML
80 INJECTION, POWDER, LYOPHILIZED, FOR SOLUTION INTRAMUSCULAR; INTRAVENOUS EVERY 8 HOURS
Status: DISCONTINUED | OUTPATIENT
Start: 2024-01-03 | End: 2024-01-04

## 2024-01-03 RX ORDER — POLYETHYLENE GLYCOL 3350 17 G/17G
17 POWDER, FOR SOLUTION ORAL DAILY PRN
Status: DISCONTINUED | OUTPATIENT
Start: 2024-01-03 | End: 2024-01-05 | Stop reason: HOSPADM

## 2024-01-03 RX ORDER — BUPROPION HYDROCHLORIDE 150 MG/1
150 TABLET ORAL EVERY MORNING
Status: DISCONTINUED | OUTPATIENT
Start: 2024-01-03 | End: 2024-01-05 | Stop reason: HOSPADM

## 2024-01-03 RX ORDER — FAMOTIDINE 20 MG/1
20 TABLET, FILM COATED ORAL 2 TIMES DAILY
Status: DISCONTINUED | OUTPATIENT
Start: 2024-01-03 | End: 2024-01-05 | Stop reason: HOSPADM

## 2024-01-03 RX ORDER — SODIUM CHLORIDE 0.9 % (FLUSH) 0.9 %
5-40 SYRINGE (ML) INJECTION EVERY 12 HOURS SCHEDULED
Status: DISCONTINUED | OUTPATIENT
Start: 2024-01-03 | End: 2024-01-05 | Stop reason: HOSPADM

## 2024-01-03 RX ORDER — DOXYCYCLINE HYCLATE 100 MG/1
100 CAPSULE ORAL EVERY 12 HOURS SCHEDULED
Status: DISCONTINUED | OUTPATIENT
Start: 2024-01-03 | End: 2024-01-05 | Stop reason: HOSPADM

## 2024-01-03 RX ORDER — SODIUM CHLORIDE 0.9 % (FLUSH) 0.9 %
5-40 SYRINGE (ML) INJECTION PRN
Status: DISCONTINUED | OUTPATIENT
Start: 2024-01-03 | End: 2024-01-05 | Stop reason: HOSPADM

## 2024-01-03 RX ORDER — POTASSIUM CHLORIDE 750 MG/1
10 TABLET, EXTENDED RELEASE ORAL DAILY
Status: DISCONTINUED | OUTPATIENT
Start: 2024-01-03 | End: 2024-01-05 | Stop reason: HOSPADM

## 2024-01-03 RX ORDER — ACETAMINOPHEN 325 MG/1
650 TABLET ORAL EVERY 6 HOURS PRN
Status: DISCONTINUED | OUTPATIENT
Start: 2024-01-03 | End: 2024-01-05 | Stop reason: HOSPADM

## 2024-01-03 RX ORDER — IPRATROPIUM BROMIDE AND ALBUTEROL SULFATE 2.5; .5 MG/3ML; MG/3ML
1 SOLUTION RESPIRATORY (INHALATION)
Status: DISCONTINUED | OUTPATIENT
Start: 2024-01-03 | End: 2024-01-05 | Stop reason: HOSPADM

## 2024-01-03 RX ORDER — ONDANSETRON 4 MG/1
4 TABLET, ORALLY DISINTEGRATING ORAL EVERY 8 HOURS PRN
Status: DISCONTINUED | OUTPATIENT
Start: 2024-01-03 | End: 2024-01-05 | Stop reason: HOSPADM

## 2024-01-03 RX ORDER — SODIUM CHLORIDE 9 MG/ML
INJECTION, SOLUTION INTRAVENOUS PRN
Status: DISCONTINUED | OUTPATIENT
Start: 2024-01-03 | End: 2024-01-05 | Stop reason: HOSPADM

## 2024-01-03 RX ORDER — WATER 10 ML/10ML
INJECTION INTRAMUSCULAR; INTRAVENOUS; SUBCUTANEOUS
Status: COMPLETED
Start: 2024-01-03 | End: 2024-01-03

## 2024-01-03 RX ORDER — POTASSIUM CHLORIDE 7.45 MG/ML
10 INJECTION INTRAVENOUS PRN
Status: DISCONTINUED | OUTPATIENT
Start: 2024-01-03 | End: 2024-01-05 | Stop reason: HOSPADM

## 2024-01-03 RX ORDER — MAGNESIUM SULFATE IN WATER 40 MG/ML
2000 INJECTION, SOLUTION INTRAVENOUS PRN
Status: DISCONTINUED | OUTPATIENT
Start: 2024-01-03 | End: 2024-01-05 | Stop reason: HOSPADM

## 2024-01-03 RX ADMIN — IPRATROPIUM BROMIDE AND ALBUTEROL SULFATE 1 DOSE: 2.5; .5 SOLUTION RESPIRATORY (INHALATION) at 11:03

## 2024-01-03 RX ADMIN — FAMOTIDINE 20 MG: 20 TABLET ORAL at 09:32

## 2024-01-03 RX ADMIN — SODIUM CHLORIDE, PRESERVATIVE FREE 10 ML: 5 INJECTION INTRAVENOUS at 22:39

## 2024-01-03 RX ADMIN — DOXYCYCLINE HYCLATE 100 MG: 100 CAPSULE ORAL at 22:38

## 2024-01-03 RX ADMIN — DULOXETINE HYDROCHLORIDE 60 MG: 60 CAPSULE, DELAYED RELEASE ORAL at 22:39

## 2024-01-03 RX ADMIN — WATER 10 ML: 1 INJECTION INTRAMUSCULAR; INTRAVENOUS; SUBCUTANEOUS at 14:34

## 2024-01-03 RX ADMIN — METHYLPREDNISOLONE SODIUM SUCCINATE 80 MG: 125 INJECTION INTRAMUSCULAR; INTRAVENOUS at 14:34

## 2024-01-03 RX ADMIN — ARFORMOTEROL TARTRATE 15 MCG: 15 SOLUTION RESPIRATORY (INHALATION) at 20:51

## 2024-01-03 RX ADMIN — ENOXAPARIN SODIUM 30 MG: 100 INJECTION SUBCUTANEOUS at 22:40

## 2024-01-03 RX ADMIN — SODIUM CHLORIDE: 9 INJECTION, SOLUTION INTRAVENOUS at 18:11

## 2024-01-03 RX ADMIN — PREGABALIN 75 MG: 75 CAPSULE ORAL at 22:38

## 2024-01-03 RX ADMIN — ROSUVASTATIN CALCIUM 20 MG: 20 TABLET, FILM COATED ORAL at 09:32

## 2024-01-03 RX ADMIN — BUPROPION 150 MG: 150 TABLET, EXTENDED RELEASE ORAL at 09:31

## 2024-01-03 RX ADMIN — METHYLPREDNISOLONE SODIUM SUCCINATE 80 MG: 125 INJECTION INTRAMUSCULAR; INTRAVENOUS at 06:03

## 2024-01-03 RX ADMIN — METHYLPREDNISOLONE SODIUM SUCCINATE 80 MG: 125 INJECTION INTRAMUSCULAR; INTRAVENOUS at 22:41

## 2024-01-03 RX ADMIN — SODIUM CHLORIDE, PRESERVATIVE FREE 10 ML: 5 INJECTION INTRAVENOUS at 09:33

## 2024-01-03 RX ADMIN — LOSARTAN POTASSIUM 50 MG: 50 TABLET, FILM COATED ORAL at 09:32

## 2024-01-03 RX ADMIN — IPRATROPIUM BROMIDE AND ALBUTEROL SULFATE 1 DOSE: 2.5; .5 SOLUTION RESPIRATORY (INHALATION) at 14:28

## 2024-01-03 RX ADMIN — PREGABALIN 75 MG: 75 CAPSULE ORAL at 14:34

## 2024-01-03 RX ADMIN — POTASSIUM CHLORIDE 10 MEQ: 750 TABLET, EXTENDED RELEASE ORAL at 09:32

## 2024-01-03 RX ADMIN — ALLOPURINOL 300 MG: 300 TABLET ORAL at 09:31

## 2024-01-03 RX ADMIN — FAMOTIDINE 20 MG: 20 TABLET ORAL at 22:39

## 2024-01-03 RX ADMIN — ENOXAPARIN SODIUM 30 MG: 100 INJECTION SUBCUTANEOUS at 09:32

## 2024-01-03 RX ADMIN — PREGABALIN 75 MG: 75 CAPSULE ORAL at 09:31

## 2024-01-03 RX ADMIN — IPRATROPIUM BROMIDE AND ALBUTEROL SULFATE 1 DOSE: 2.5; .5 SOLUTION RESPIRATORY (INHALATION) at 20:51

## 2024-01-03 RX ADMIN — IPRATROPIUM BROMIDE AND ALBUTEROL SULFATE 1 DOSE: 2.5; .5 SOLUTION RESPIRATORY (INHALATION) at 07:12

## 2024-01-03 RX ADMIN — DULOXETINE HYDROCHLORIDE 60 MG: 60 CAPSULE, DELAYED RELEASE ORAL at 09:32

## 2024-01-03 ASSESSMENT — PAIN - FUNCTIONAL ASSESSMENT: PAIN_FUNCTIONAL_ASSESSMENT: NONE - DENIES PAIN

## 2024-01-03 ASSESSMENT — PAIN SCALES - GENERAL: PAINLEVEL_OUTOF10: 0

## 2024-01-03 NOTE — ED NOTES
Pt cleaned up and placed into new gown. Bed change completed and new purewick placed. Pt adjusted in bed and resting at this time.

## 2024-01-03 NOTE — H&P
[facility] History and Physical      CHIEF COMPLAINT: Shortness of breath    History of Present Illness: 58-year-old lady with past medical history significant for hypertension hyperlipidemia COPD ADHD depression GERD and narcolepsy presented to ED with cough and shortness of breath for 4 to 5 days.  She has chronic obstructive pulmonary disease and uses oxygen at 5 L by nasal cannula at home.  More than 50-pack-year smoking history history and currently smoker.  Her symptoms started 4 to 5 days ago with some upper respiratory symptoms like cough and shortness of breath which progressed gradually to a point that she could not finish 1 sentence without gasping for air.  She checked her saturation at home and was around 80% at 5 L.  Denies any chest pain, belly pain, nausea vomiting diarrhea, lightheadedness or dizziness, no focal neurological signs, no fever or chills.    Informant(s) for H&P: Patient    REVIEW OF SYSTEMS:  A comprehensive review of systems was negative except for: what is in the HPI      PMH:  Past Medical History:   Diagnosis Date    ADHD (attention deficit hyperactivity disorder)     COPD (chronic obstructive pulmonary disease) (HCC)     Depression     GERD (gastroesophageal reflux disease)     Gout     Headache     migraines    Hyperlipidemia     Hypertension     Irritable bowel syndrome     Narcolepsy     Rhinovirus        Surgical History:  Past Surgical History:   Procedure Laterality Date    TUBAL LIGATION         Medications Prior to Admission:    Prior to Admission medications    Medication Sig Start Date End Date Taking? Authorizing Provider   rosuvastatin (CRESTOR) 20 MG tablet take 1 tablet by mouth once daily 10/23/23   Marli Patel DO   DULoxetine (CYMBALTA) 60 MG extended release capsule take 1 capsule by mouth twice a day 10/23/23   Marli Patel DO   allopurinol (ZYLOPRIM) 300 MG tablet take 1 tablet by mouth once daily 10/23/23   Marli Patel

## 2024-01-03 NOTE — ED NOTES
Patient placed on 5lpm via nasal cannula for transport will monitor to ensure patient stable for transport as [facility] does not have squad available until AM to transfer patient on bipap. O2 sat currently 98% 5lpm.

## 2024-01-03 NOTE — PROGRESS NOTES
Patient with no observed urine on day shift in Pure Wick canister or on bed pads. Patient did get up to have a bowel movement, but does not think she voided. BladderScan showing 32 ml. Will continue to monitor and encourage fluid intake. Dr. Belle updated. Will initiate IV fluids at 75.    Electronically signed by Ana Leggett RN on 1/3/2024 at 6:03 PM

## 2024-01-03 NOTE — PROGRESS NOTES
4 Eyes Skin Assessment     NAME:  Tamiko Ch  YOB: 1965  MEDICAL RECORD NUMBER:  [id number]    The patient is being assessed for  Admission    I agree that at least one RN has performed a thorough Head to Toe Skin Assessment on the patient. ALL assessment sites listed below have been assessed.      Areas assessed by both nurses:    Head, Face, Ears, Shoulders, Back, Chest, Arms, Elbows, Hands, Sacrum. Buttock, Coccyx, Ischium, Legs. Feet and Heels, and Under Medical Devices         Does the Patient have a Wound? No noted wound(s)       Saeid Prevention initiated by RN: No  Wound Care Orders initiated by RN: No    Pressure Injury (Stage 3,4, Unstageable, DTI, NWPT, and Complex wounds) if present, place Wound referral order by RN under : No    New Ostomies, if present place, Ostomy referral order under : No     Nurse 1 eSignature: Electronically signed by Yue Nolan RN on 1/3/24 at 5:24 AM EST    **SHARE this note so that the co-signing nurse can place an eSignature**    Nurse 2 eSignature: Electronically signed by BENITO PINEDA RN on 1/3/24 at 5:27 AM EST

## 2024-01-03 NOTE — ACP (ADVANCE CARE PLANNING)
Advance Care Planning   Healthcare Decision Maker:    Primary Decision Maker: julieta salas - [phone]    Click here to complete Healthcare Decision Makers including selection of the Healthcare Decision Maker Relationship (ie \"Primary\").

## 2024-01-03 NOTE — PLAN OF CARE
Problem: Discharge Planning  Goal: Discharge to home or other facility with appropriate resources  Outcome: Progressing  Flowsheets (Taken 1/3/2024 6808)  Discharge to home or other facility with appropriate resources:   Identify barriers to discharge with patient and caregiver   Arrange for needed discharge resources and transportation as appropriate   Identify discharge learning needs (meds, wound care, etc)     Problem: Safety - Adult  Goal: Free from fall injury  Outcome: Progressing     Problem: ABCDS Injury Assessment  Goal: Absence of physical injury  Outcome: Progressing

## 2024-01-03 NOTE — ED NOTES
[facility] called for update on transport. Crew is currently on call at this time. Crew will be coming to get this patient in the next hour.

## 2024-01-03 NOTE — CARE COORDINATION
Transition of Care-Met with patient at the bedside, introduced myself and Cm role in discharge plan. Patient lives alone in a third floor apartment, fifteen steps to gain entry to her unit. She reports being independent with ADL's. She does wear oxygen chronically-her baseline is 5L-provided by [employer], she is currently requiring 6L. PCP is Dr. Patel, preferred pharmacy is [facility] in [place]. Patient does not have a nebulizer or CPAP at home, she has no history of HHC or SNF. She does not anticipate any needs at discharge, her son will transport home.    Yun NAILSN, RN  [facility]

## 2024-01-03 NOTE — ED NOTES
Phoned [facility] and they state transport never arranged however it is documented that it is. Ok for patient to tx on nonrebreather as [facility] states they do not have a crew that can transport with bipap until AM. OK to be tx with nonrebreather per Dr. Chan. [facility] upted. New ETA 0200.

## 2024-01-03 NOTE — ED NOTES
Called [facility] at this time to confirm transport is set up for this pt. [facility] stated transport was not set up early and they just received knowledge of transport of this pt. [facility] ETA is 0200 1/3/24.

## 2024-01-04 ENCOUNTER — TELEPHONE (OUTPATIENT)
Dept: PULMONOLOGY | Age: 59
End: 2024-01-04

## 2024-01-04 LAB
ALBUMIN SERPL-MCNC: 3.5 G/DL (ref 3.5–5.2)
ALP SERPL-CCNC: 119 U/L (ref 35–104)
ALT SERPL-CCNC: 16 U/L (ref 0–32)
ANION GAP SERPL CALCULATED.3IONS-SCNC: 8 MMOL/L (ref 7–16)
AST SERPL-CCNC: 12 U/L (ref 0–31)
BASOPHILS # BLD: 0.01 K/UL (ref 0–0.2)
BASOPHILS NFR BLD: 0 % (ref 0–2)
BILIRUB SERPL-MCNC: 0.2 MG/DL (ref 0–1.2)
BUN SERPL-MCNC: 39 MG/DL (ref 6–23)
CALCIUM SERPL-MCNC: 9.2 MG/DL (ref 8.6–10.2)
CHLORIDE SERPL-SCNC: 102 MMOL/L (ref 98–107)
CO2 SERPL-SCNC: 30 MMOL/L (ref 22–29)
CREAT SERPL-MCNC: 1.1 MG/DL (ref 0.5–1)
EOSINOPHIL # BLD: 0 K/UL (ref 0.05–0.5)
EOSINOPHILS RELATIVE PERCENT: 0 % (ref 0–6)
ERYTHROCYTE [DISTWIDTH] IN BLOOD BY AUTOMATED COUNT: 14.5 % (ref 11.5–15)
GFR SERPL CREATININE-BSD FRML MDRD: >60 ML/MIN/1.73M2
GLUCOSE SERPL-MCNC: 143 MG/DL (ref 74–99)
HCT VFR BLD AUTO: 44.6 % (ref 34–48)
HGB BLD-MCNC: 14.3 G/DL (ref 11.5–15.5)
IMM GRANULOCYTES # BLD AUTO: 0.03 K/UL (ref 0–0.58)
IMM GRANULOCYTES NFR BLD: 0 % (ref 0–5)
LYMPHOCYTES NFR BLD: 0.57 K/UL (ref 1.5–4)
LYMPHOCYTES RELATIVE PERCENT: 7 % (ref 20–42)
MAGNESIUM SERPL-MCNC: 1.9 MG/DL (ref 1.6–2.6)
MCH RBC QN AUTO: 33.6 PG (ref 26–35)
MCHC RBC AUTO-ENTMCNC: 32.1 G/DL (ref 32–34.5)
MCV RBC AUTO: 104.7 FL (ref 80–99.9)
MICROORGANISM SPEC CULT: NORMAL
MICROORGANISM/AGENT SPEC: NORMAL
MONOCYTES NFR BLD: 0.31 K/UL (ref 0.1–0.95)
MONOCYTES NFR BLD: 4 % (ref 2–12)
NEUTROPHILS NFR BLD: 89 % (ref 43–80)
NEUTS SEG NFR BLD: 7.7 K/UL (ref 1.8–7.3)
PHOSPHATE SERPL-MCNC: 4.2 MG/DL (ref 2.5–4.5)
PLATELET # BLD AUTO: 264 K/UL (ref 130–450)
PMV BLD AUTO: 10 FL (ref 7–12)
POTASSIUM SERPL-SCNC: 4.6 MMOL/L (ref 3.5–5)
PROT SERPL-MCNC: 6.9 G/DL (ref 6.4–8.3)
RBC # BLD AUTO: 4.26 M/UL (ref 3.5–5.5)
RBC # BLD: ABNORMAL 10*6/UL
SODIUM SERPL-SCNC: 140 MMOL/L (ref 132–146)
SPECIMEN DESCRIPTION: NORMAL
WBC OTHER # BLD: 8.6 K/UL (ref 4.5–11.5)

## 2024-01-04 PROCEDURE — 85025 COMPLETE CBC W/AUTO DIFF WBC: CPT

## 2024-01-04 PROCEDURE — 6370000000 HC RX 637 (ALT 250 FOR IP): Performed by: STUDENT IN AN ORGANIZED HEALTH CARE EDUCATION/TRAINING PROGRAM

## 2024-01-04 PROCEDURE — 6360000002 HC RX W HCPCS: Performed by: NURSE PRACTITIONER

## 2024-01-04 PROCEDURE — 6370000000 HC RX 637 (ALT 250 FOR IP): Performed by: INTERNAL MEDICINE

## 2024-01-04 PROCEDURE — 6360000002 HC RX W HCPCS: Performed by: INTERNAL MEDICINE

## 2024-01-04 PROCEDURE — 2060000000 HC ICU INTERMEDIATE R&B

## 2024-01-04 PROCEDURE — 2580000003 HC RX 258: Performed by: INTERNAL MEDICINE

## 2024-01-04 PROCEDURE — 6360000002 HC RX W HCPCS: Performed by: STUDENT IN AN ORGANIZED HEALTH CARE EDUCATION/TRAINING PROGRAM

## 2024-01-04 PROCEDURE — 83735 ASSAY OF MAGNESIUM: CPT

## 2024-01-04 PROCEDURE — 82104 ALPHA-1-ANTITRYPSIN PHENO: CPT

## 2024-01-04 PROCEDURE — 84100 ASSAY OF PHOSPHORUS: CPT

## 2024-01-04 PROCEDURE — 82103 ALPHA-1-ANTITRYPSIN TOTAL: CPT

## 2024-01-04 PROCEDURE — 2580000003 HC RX 258: Performed by: STUDENT IN AN ORGANIZED HEALTH CARE EDUCATION/TRAINING PROGRAM

## 2024-01-04 PROCEDURE — 2700000000 HC OXYGEN THERAPY PER DAY

## 2024-01-04 PROCEDURE — 82784 ASSAY IGA/IGD/IGG/IGM EACH: CPT

## 2024-01-04 PROCEDURE — 94640 AIRWAY INHALATION TREATMENT: CPT

## 2024-01-04 PROCEDURE — 80053 COMPREHEN METABOLIC PANEL: CPT

## 2024-01-04 PROCEDURE — 94660 CPAP INITIATION&MGMT: CPT

## 2024-01-04 PROCEDURE — 36415 COLL VENOUS BLD VENIPUNCTURE: CPT

## 2024-01-04 RX ORDER — METHYLPREDNISOLONE SODIUM SUCCINATE 40 MG/ML
40 INJECTION, POWDER, LYOPHILIZED, FOR SOLUTION INTRAMUSCULAR; INTRAVENOUS EVERY 12 HOURS
Status: DISCONTINUED | OUTPATIENT
Start: 2024-01-05 | End: 2024-01-05

## 2024-01-04 RX ORDER — DOXYCYCLINE HYCLATE 100 MG/1
100 CAPSULE ORAL EVERY 12 HOURS SCHEDULED
Qty: 8 CAPSULE | Refills: 0 | Status: SHIPPED | OUTPATIENT
Start: 2024-01-04 | End: 2024-01-08

## 2024-01-04 RX ORDER — METHYLPREDNISOLONE SODIUM SUCCINATE 40 MG/ML
40 INJECTION, POWDER, LYOPHILIZED, FOR SOLUTION INTRAMUSCULAR; INTRAVENOUS EVERY 8 HOURS
Status: DISCONTINUED | OUTPATIENT
Start: 2024-01-04 | End: 2024-01-04

## 2024-01-04 RX ORDER — FAMOTIDINE 20 MG/1
20 TABLET, FILM COATED ORAL 2 TIMES DAILY
Qty: 60 TABLET | Refills: 3 | Status: SHIPPED | OUTPATIENT
Start: 2024-01-04

## 2024-01-04 RX ADMIN — PREGABALIN 75 MG: 75 CAPSULE ORAL at 09:37

## 2024-01-04 RX ADMIN — LOSARTAN POTASSIUM 50 MG: 50 TABLET, FILM COATED ORAL at 09:37

## 2024-01-04 RX ADMIN — IPRATROPIUM BROMIDE AND ALBUTEROL SULFATE 1 DOSE: 2.5; .5 SOLUTION RESPIRATORY (INHALATION) at 20:38

## 2024-01-04 RX ADMIN — IPRATROPIUM BROMIDE AND ALBUTEROL SULFATE 1 DOSE: 2.5; .5 SOLUTION RESPIRATORY (INHALATION) at 12:35

## 2024-01-04 RX ADMIN — FAMOTIDINE 20 MG: 20 TABLET ORAL at 09:37

## 2024-01-04 RX ADMIN — METHYLPREDNISOLONE SODIUM SUCCINATE 80 MG: 125 INJECTION INTRAMUSCULAR; INTRAVENOUS at 05:16

## 2024-01-04 RX ADMIN — SODIUM CHLORIDE, PRESERVATIVE FREE 10 ML: 5 INJECTION INTRAVENOUS at 09:44

## 2024-01-04 RX ADMIN — DOXYCYCLINE HYCLATE 100 MG: 100 CAPSULE ORAL at 21:05

## 2024-01-04 RX ADMIN — ALLOPURINOL 300 MG: 300 TABLET ORAL at 09:37

## 2024-01-04 RX ADMIN — FAMOTIDINE 20 MG: 20 TABLET ORAL at 21:05

## 2024-01-04 RX ADMIN — DOXYCYCLINE HYCLATE 100 MG: 100 CAPSULE ORAL at 09:37

## 2024-01-04 RX ADMIN — POTASSIUM CHLORIDE 40 MEQ: 750 TABLET, EXTENDED RELEASE ORAL at 09:37

## 2024-01-04 RX ADMIN — IPRATROPIUM BROMIDE AND ALBUTEROL SULFATE 1 DOSE: 2.5; .5 SOLUTION RESPIRATORY (INHALATION) at 16:27

## 2024-01-04 RX ADMIN — ROSUVASTATIN CALCIUM 20 MG: 20 TABLET, FILM COATED ORAL at 09:36

## 2024-01-04 RX ADMIN — PREGABALIN 75 MG: 75 CAPSULE ORAL at 13:05

## 2024-01-04 RX ADMIN — POTASSIUM CHLORIDE 10 MEQ: 750 TABLET, EXTENDED RELEASE ORAL at 10:36

## 2024-01-04 RX ADMIN — DULOXETINE HYDROCHLORIDE 60 MG: 60 CAPSULE, DELAYED RELEASE ORAL at 09:37

## 2024-01-04 RX ADMIN — SODIUM CHLORIDE: 9 INJECTION, SOLUTION INTRAVENOUS at 05:21

## 2024-01-04 RX ADMIN — IPRATROPIUM BROMIDE AND ALBUTEROL SULFATE 1 DOSE: 2.5; .5 SOLUTION RESPIRATORY (INHALATION) at 08:49

## 2024-01-04 RX ADMIN — BUPROPION 150 MG: 150 TABLET, EXTENDED RELEASE ORAL at 09:36

## 2024-01-04 RX ADMIN — ARFORMOTEROL TARTRATE 15 MCG: 15 SOLUTION RESPIRATORY (INHALATION) at 20:38

## 2024-01-04 RX ADMIN — PREGABALIN 75 MG: 75 CAPSULE ORAL at 21:05

## 2024-01-04 RX ADMIN — DULOXETINE HYDROCHLORIDE 60 MG: 60 CAPSULE, DELAYED RELEASE ORAL at 21:05

## 2024-01-04 RX ADMIN — ARFORMOTEROL TARTRATE 15 MCG: 15 SOLUTION RESPIRATORY (INHALATION) at 08:49

## 2024-01-04 RX ADMIN — ENOXAPARIN SODIUM 30 MG: 100 INJECTION SUBCUTANEOUS at 21:05

## 2024-01-04 RX ADMIN — METHYLPREDNISOLONE SODIUM SUCCINATE 40 MG: 40 INJECTION INTRAMUSCULAR; INTRAVENOUS at 13:05

## 2024-01-04 RX ADMIN — ENOXAPARIN SODIUM 30 MG: 100 INJECTION SUBCUTANEOUS at 09:37

## 2024-01-04 NOTE — PROGRESS NOTES
Pt seen and examined by night physician who admitted pt earlier today.     Chart reviewed and updated by nursing

## 2024-01-04 NOTE — TELEPHONE ENCOUNTER
Called patient to schedule pulm rehab.  Pt. Stated that she is in the hospital and will call us back to schedule when she is feeling better.

## 2024-01-04 NOTE — PROGRESS NOTES
Pulse ox was 89% on 5L at rest.  Ambulated patient on 5L.  Oxygen saturation was 85% on 5L while ambulating.  Oxygen applied.  Recovery pulse ox was 93% on 6 liters of oxygen while ambulating.

## 2024-01-04 NOTE — PROGRESS NOTES
Pulmonary Progress Note    Admit Date: 1/3/2024                            PCP: Marli Patel DO  Principal Problem:    Acute on chronic respiratory failure with hypercapnia (HCC)  Active Problems:    Acute exacerbation of chronic obstructive pulmonary disease (COPD) ([facility])  Resolved Problems:    * No resolved hospital problems. *      Subjective:  Resting on bipap   Still c/o wz and productive cough with clear        Medications:   sodium chloride      sodium chloride 75 mL/hr at 24 0521        sodium chloride flush  5-40 mL IntraVENous 2 times per day    enoxaparin  30 mg SubCUTAneous BID    famotidine  20 mg Oral BID    ipratropium 0.5 mg-albuterol 2.5 mg  1 Dose Inhalation Q4H WA RT    methylPREDNISolone  80 mg IntraVENous Q8H    allopurinol  300 mg Oral Daily    amphetamine-dextroamphetamine  30 mg Oral Daily    buPROPion  150 mg Oral QAM    DULoxetine  60 mg Oral BID    losartan  50 mg Oral Daily    potassium chloride  10 mEq Oral Daily    pregabalin  75 mg Oral TID    rosuvastatin  20 mg Oral Daily    arformoterol tartrate  15 mcg Nebulization BID RT    doxycycline hyclate  100 mg Oral 2 times per day       Vitals:  VITALS:  /60   Pulse 80   Temp 98.1 °F (36.7 °C) (Oral)   Resp 24   Ht 1.6 m (5' 3\")   Wt 110.1 kg (242 lb 12.8 oz)   SpO2 94%   BMI 43.01 kg/m²   24HR INTAKE/OUTPUT:    Intake/Output Summary (Last 24 hours) at 2024 0844  Last data filed at 2024 0321  Gross per 24 hour   Intake --   Output 100 ml   Net -100 ml     CURRENT PULSE OXIMETRY:  SpO2: 94 %  24HR PULSE OXIMETRY RANGE:  SpO2  Av.6 %  Min: 93 %  Max: 98 %  CVP:    VENT SETTINGS:      Additional Respiratory Assessments  Pulse: 80  Respirations: 24  SpO2: 94 %      EXAM:  General: Alert, in NAD, obese   ENT: No discharge. Pharynx clear. membranes moist   Neck: Supple, Trachea midline.  Resp: No accessory muscle use. Non-labored. No rhonchi. Scattered wheezing   CV: Regular rate. Regular rhythm. No

## 2024-01-04 NOTE — PROGRESS NOTES
Spoke with Dr. Hammond regarding Solumedrol dose per pulmonology. She will make adjustments to home steroids, but still intends to discharge.    Electronically signed by Krystin Ferrer RN on 1/4/2024 at 4:49 PM

## 2024-01-04 NOTE — PROGRESS NOTES
Per update from Dr. Joseph, he would like patient to have another day before discharge. Updated Dr. Hammond.    Electronically signed by Ana Leggett RN on 1/4/2024 at 6:02 PM

## 2024-01-04 NOTE — PROGRESS NOTES
Fax sent to [employer] with updated O2 order and updated pulse ox testing.    Electronically signed by Krystin Ferrer RN on 1/4/2024 at 4:50 PM

## 2024-01-04 NOTE — CARE COORDINATION
Transition of Care-Pulmonology following, patient remains on IV steroids ( Solu-Medrol Q8), dose decreased to 40mg from 80mg. Patient stated to Pulm, that she is using her mother's equipment from [employer]-left message with liaison to clarify. Patient is currently requiring 6L or BiPAP. She will need ambulatory pulse ox testing. Discharge plan is home-no anticipated needs other than oxygen equipment.    Yun PIERRE, RN  [facility]

## 2024-01-05 VITALS
HEIGHT: 63 IN | HEART RATE: 78 BPM | BODY MASS INDEX: 43.02 KG/M2 | SYSTOLIC BLOOD PRESSURE: 116 MMHG | WEIGHT: 242.8 LBS | DIASTOLIC BLOOD PRESSURE: 54 MMHG | TEMPERATURE: 98.1 F | OXYGEN SATURATION: 94 % | RESPIRATION RATE: 18 BRPM

## 2024-01-05 PROBLEM — J44.1 COPD EXACERBATION (HCC): Status: ACTIVE | Noted: 2024-01-05

## 2024-01-05 PROBLEM — I10 PRIMARY HYPERTENSION: Status: ACTIVE | Noted: 2024-01-05

## 2024-01-05 PROBLEM — J98.11 ATELECTASIS: Status: ACTIVE | Noted: 2024-01-05

## 2024-01-05 LAB
ALBUMIN SERPL-MCNC: 3.6 G/DL (ref 3.5–5.2)
ALP SERPL-CCNC: 113 U/L (ref 35–104)
ALT SERPL-CCNC: 16 U/L (ref 0–32)
ANION GAP SERPL CALCULATED.3IONS-SCNC: 11 MMOL/L (ref 7–16)
AST SERPL-CCNC: 10 U/L (ref 0–31)
BASOPHILS # BLD: 0.01 K/UL (ref 0–0.2)
BASOPHILS NFR BLD: 0 % (ref 0–2)
BILIRUB SERPL-MCNC: 0.2 MG/DL (ref 0–1.2)
BUN SERPL-MCNC: 42 MG/DL (ref 6–23)
CALCIUM SERPL-MCNC: 9.1 MG/DL (ref 8.6–10.2)
CHLORIDE SERPL-SCNC: 103 MMOL/L (ref 98–107)
CO2 SERPL-SCNC: 30 MMOL/L (ref 22–29)
CREAT SERPL-MCNC: 1.1 MG/DL (ref 0.5–1)
EOSINOPHIL # BLD: 0 K/UL (ref 0.05–0.5)
EOSINOPHILS RELATIVE PERCENT: 0 % (ref 0–6)
ERYTHROCYTE [DISTWIDTH] IN BLOOD BY AUTOMATED COUNT: 14.3 % (ref 11.5–15)
GFR SERPL CREATININE-BSD FRML MDRD: 58 ML/MIN/1.73M2
GLUCOSE SERPL-MCNC: 110 MG/DL (ref 74–99)
HCT VFR BLD AUTO: 43.3 % (ref 34–48)
HGB BLD-MCNC: 13.6 G/DL (ref 11.5–15.5)
IGE SERPL-ACNC: 82 IU/ML
IMM GRANULOCYTES # BLD AUTO: 0.05 K/UL (ref 0–0.58)
IMM GRANULOCYTES NFR BLD: 1 % (ref 0–5)
LYMPHOCYTES NFR BLD: 1 K/UL (ref 1.5–4)
LYMPHOCYTES RELATIVE PERCENT: 14 % (ref 20–42)
MCH RBC QN AUTO: 33.4 PG (ref 26–35)
MCHC RBC AUTO-ENTMCNC: 31.4 G/DL (ref 32–34.5)
MCV RBC AUTO: 106.4 FL (ref 80–99.9)
MONOCYTES NFR BLD: 0.61 K/UL (ref 0.1–0.95)
MONOCYTES NFR BLD: 9 % (ref 2–12)
NEUTROPHILS NFR BLD: 76 % (ref 43–80)
NEUTS SEG NFR BLD: 5.38 K/UL (ref 1.8–7.3)
PLATELET # BLD AUTO: 243 K/UL (ref 130–450)
PMV BLD AUTO: 10.1 FL (ref 7–12)
POTASSIUM SERPL-SCNC: 4.8 MMOL/L (ref 3.5–5)
PROT SERPL-MCNC: 6.6 G/DL (ref 6.4–8.3)
RBC # BLD AUTO: 4.07 M/UL (ref 3.5–5.5)
SODIUM SERPL-SCNC: 144 MMOL/L (ref 132–146)
WBC OTHER # BLD: 7.1 K/UL (ref 4.5–11.5)

## 2024-01-05 PROCEDURE — 80053 COMPREHEN METABOLIC PANEL: CPT

## 2024-01-05 PROCEDURE — 6360000002 HC RX W HCPCS: Performed by: INTERNAL MEDICINE

## 2024-01-05 PROCEDURE — 6360000002 HC RX W HCPCS: Performed by: STUDENT IN AN ORGANIZED HEALTH CARE EDUCATION/TRAINING PROGRAM

## 2024-01-05 PROCEDURE — 6370000000 HC RX 637 (ALT 250 FOR IP): Performed by: STUDENT IN AN ORGANIZED HEALTH CARE EDUCATION/TRAINING PROGRAM

## 2024-01-05 PROCEDURE — 99239 HOSP IP/OBS DSCHRG MGMT >30: CPT | Performed by: INTERNAL MEDICINE

## 2024-01-05 PROCEDURE — 94660 CPAP INITIATION&MGMT: CPT

## 2024-01-05 PROCEDURE — 94640 AIRWAY INHALATION TREATMENT: CPT

## 2024-01-05 PROCEDURE — 6370000000 HC RX 637 (ALT 250 FOR IP): Performed by: INTERNAL MEDICINE

## 2024-01-05 PROCEDURE — 85025 COMPLETE CBC W/AUTO DIFF WBC: CPT

## 2024-01-05 PROCEDURE — 2700000000 HC OXYGEN THERAPY PER DAY

## 2024-01-05 PROCEDURE — 2580000003 HC RX 258: Performed by: STUDENT IN AN ORGANIZED HEALTH CARE EDUCATION/TRAINING PROGRAM

## 2024-01-05 RX ORDER — PREDNISONE 10 MG/1
TABLET ORAL
Qty: 10 TABLET | Refills: 0 | Status: SHIPPED | OUTPATIENT
Start: 2024-01-05

## 2024-01-05 RX ORDER — PREDNISONE 20 MG/1
40 TABLET ORAL DAILY
Status: DISCONTINUED | OUTPATIENT
Start: 2024-01-06 | End: 2024-01-05 | Stop reason: HOSPADM

## 2024-01-05 RX ADMIN — LOSARTAN POTASSIUM 50 MG: 50 TABLET, FILM COATED ORAL at 09:51

## 2024-01-05 RX ADMIN — IPRATROPIUM BROMIDE AND ALBUTEROL SULFATE 1 DOSE: 2.5; .5 SOLUTION RESPIRATORY (INHALATION) at 16:09

## 2024-01-05 RX ADMIN — SODIUM CHLORIDE, PRESERVATIVE FREE 10 ML: 5 INJECTION INTRAVENOUS at 09:51

## 2024-01-05 RX ADMIN — IPRATROPIUM BROMIDE AND ALBUTEROL SULFATE 1 DOSE: 2.5; .5 SOLUTION RESPIRATORY (INHALATION) at 08:45

## 2024-01-05 RX ADMIN — POTASSIUM CHLORIDE 10 MEQ: 750 TABLET, EXTENDED RELEASE ORAL at 09:51

## 2024-01-05 RX ADMIN — PREGABALIN 75 MG: 75 CAPSULE ORAL at 13:13

## 2024-01-05 RX ADMIN — METHYLPREDNISOLONE SODIUM SUCCINATE 40 MG: 40 INJECTION INTRAMUSCULAR; INTRAVENOUS at 01:08

## 2024-01-05 RX ADMIN — ROSUVASTATIN CALCIUM 20 MG: 20 TABLET, FILM COATED ORAL at 09:50

## 2024-01-05 RX ADMIN — IPRATROPIUM BROMIDE AND ALBUTEROL SULFATE 1 DOSE: 2.5; .5 SOLUTION RESPIRATORY (INHALATION) at 12:36

## 2024-01-05 RX ADMIN — METHYLPREDNISOLONE SODIUM SUCCINATE 40 MG: 40 INJECTION INTRAMUSCULAR; INTRAVENOUS at 13:13

## 2024-01-05 RX ADMIN — DOXYCYCLINE HYCLATE 100 MG: 100 CAPSULE ORAL at 09:50

## 2024-01-05 RX ADMIN — ALLOPURINOL 300 MG: 300 TABLET ORAL at 09:51

## 2024-01-05 RX ADMIN — PREGABALIN 75 MG: 75 CAPSULE ORAL at 09:50

## 2024-01-05 RX ADMIN — DULOXETINE HYDROCHLORIDE 60 MG: 60 CAPSULE, DELAYED RELEASE ORAL at 09:50

## 2024-01-05 RX ADMIN — FAMOTIDINE 20 MG: 20 TABLET ORAL at 09:51

## 2024-01-05 RX ADMIN — ENOXAPARIN SODIUM 30 MG: 100 INJECTION SUBCUTANEOUS at 09:50

## 2024-01-05 RX ADMIN — BUPROPION 150 MG: 150 TABLET, EXTENDED RELEASE ORAL at 09:50

## 2024-01-05 RX ADMIN — ARFORMOTEROL TARTRATE 15 MCG: 15 SOLUTION RESPIRATORY (INHALATION) at 08:45

## 2024-01-05 NOTE — PROGRESS NOTES
Pulse ox was 89% on room air at rest.  Ambulated patient on room air.  Oxygen saturation was 87% on room air while ambulating.  Oxygen applied.  Recovery pulse ox was 96% on 6 liters of oxygen while ambulating.

## 2024-01-05 NOTE — PROGRESS NOTES
Pulmonary Progress Note    Admit Date: 1/3/2024                            PCP: Marli Patel DO  Principal Problem:    Acute on chronic respiratory failure with hypercapnia (HCC)  Active Problems:    Acute exacerbation of chronic obstructive pulmonary disease (COPD) (HCC)  Resolved Problems:    * No resolved hospital problems. *      Subjective:  Resting on 5L nc   Still c/o wz and productive cough with clear  She is worried about being discharge and still does not feel she is ready         Medications:   sodium chloride      sodium chloride 75 mL/hr at 24 0521        methylPREDNISolone  40 mg IntraVENous Q12H    sodium chloride flush  5-40 mL IntraVENous 2 times per day    enoxaparin  30 mg SubCUTAneous BID    famotidine  20 mg Oral BID    ipratropium 0.5 mg-albuterol 2.5 mg  1 Dose Inhalation Q4H WA RT    allopurinol  300 mg Oral Daily    amphetamine-dextroamphetamine  30 mg Oral Daily    buPROPion  150 mg Oral QAM    DULoxetine  60 mg Oral BID    losartan  50 mg Oral Daily    potassium chloride  10 mEq Oral Daily    pregabalin  75 mg Oral TID    rosuvastatin  20 mg Oral Daily    arformoterol tartrate  15 mcg Nebulization BID RT    doxycycline hyclate  100 mg Oral 2 times per day       Vitals:  VITALS:  /81   Pulse 89   Temp 97.4 °F (36.3 °C) (Oral)   Resp 24   Ht 1.6 m (5' 3\")   Wt 110.1 kg (242 lb 12.8 oz)   SpO2 99%   BMI 43.01 kg/m²   24HR INTAKE/OUTPUT:    Intake/Output Summary (Last 24 hours) at 2024 1016  Last data filed at 2024 1617  Gross per 24 hour   Intake --   Output 400 ml   Net -400 ml     CURRENT PULSE OXIMETRY:  SpO2: 99 %  24HR PULSE OXIMETRY RANGE:  SpO2  Av.5 %  Min: 94 %  Max: 99 %  CVP:    VENT SETTINGS:      Additional Respiratory Assessments  Pulse: 89  Respirations: 24  SpO2: 99 %      EXAM:  General: Alert, in NAD, obese, 5L   ENT: No discharge. Pharynx clear. membranes moist   Neck: Supple, Trachea midline.  Resp: No accessory muscle use.  2 times per day Lyle Salas MD   100 mg at 01/05/24 0950        Chronic hypoxic and hypercapnic respiratory failure - linked to COPD  BIPAP and BIPAP VAPS have been ruled out  will  need NIV     TN MAX 35  Rate: Auto   +/- 100cc  EPAP MIN 4  EPAP MAX 15  PS MIN 6  PS MAX 30    Patient would benefit from non-invasive ventilation due to chronic respiratory failure linked to COPD.  Patient would benefit from AVAPS-AE, auto-epap with added targeted tidal volume and rate along with mouthpiece ventilation for daytime use that is not offered by BIPAP., BIPAP-ST or BIPAP VAPS.  AVAPS-AE would also benefit patient due to the added alarm and backup battery.  Patient last PFT and history notes severe COPD, along with elevated ABG.  It is therefore required that due to the severity of lung disease present that the non-invasive ventilator is medically necessary in the management of the patient's severe condition.  Without advanced therapy in the home this patient is at risk for life-threatening exacerbations of their diagnosis that will require future hospitalizations.     Please call Mary Mcgee with any questions.  [phone].    - steroids to PO x 7 days  - supportive care to continue   - remainder as above  - look to D/C patient and assess for NIV at night through [facility]   - outpatient PFT, PSG    Lyle Salas MD  1/5/2024  5:12 PM

## 2024-01-05 NOTE — DISCHARGE SUMMARY
01/05/2024 04:00 AM    MCHC 31.4 01/05/2024 04:00 AM    RDW 14.3 01/05/2024 04:00 AM    NRBC 1 09/19/2023 04:52 AM    LYMPHOPCT 14 01/05/2024 04:00 AM    MONOPCT 9 01/05/2024 04:00 AM    BASOPCT 0 01/05/2024 04:00 AM    MONOSABS 0.61 01/05/2024 04:00 AM    LYMPHSABS 1.00 01/05/2024 04:00 AM    EOSABS 0.00 01/05/2024 04:00 AM    BASOSABS 0.01 01/05/2024 04:00 AM     CMP:    Lab Results   Component Value Date/Time     01/05/2024 04:00 AM    K 4.8 01/05/2024 04:00 AM     01/05/2024 04:00 AM    CO2 30 01/05/2024 04:00 AM    BUN 42 01/05/2024 04:00 AM    CREATININE 1.1 01/05/2024 04:00 AM    GFRAA >60 10/14/2022 08:13 AM    LABGLOM 58 01/05/2024 04:00 AM    GLUCOSE 110 01/05/2024 04:00 AM    PROT 6.6 01/05/2024 04:00 AM    LABALBU 3.6 01/05/2024 04:00 AM    CALCIUM 9.1 01/05/2024 04:00 AM    BILITOT 0.2 01/05/2024 04:00 AM    ALKPHOS 113 01/05/2024 04:00 AM    AST 10 01/05/2024 04:00 AM    ALT 16 01/05/2024 04:00 AM       Imaging:   No orders to display       Patient Instructions:      Medication List        START taking these medications      doxycycline hyclate 100 MG capsule  Commonly known as: VIBRAMYCIN  Take 1 capsule by mouth every 12 hours for 8 doses     famotidine 20 MG tablet  Commonly known as: PEPCID  Take 1 tablet by mouth 2 times daily     predniSONE 10 MG tablet  Commonly known as: DELTASONE  Take orally 4 tabs on 1/6; 3 tabs on 1/7; 2 tabs on 1/8; 1 tab on 1/9            CONTINUE taking these medications      albuterol sulfate  (90 Base) MCG/ACT inhaler  Commonly known as: Ventolin HFA  Inhale 2 puffs into the lungs 4 times daily as needed for Wheezing     allopurinol 300 MG tablet  Commonly known as: ZYLOPRIM  take 1 tablet by mouth once daily     * amphetamine-dextroamphetamine 30 MG extended release capsule  Commonly known as: Adderall XR  Take 1 capsule by mouth daily for 30 days.     * amphetamine-dextroamphetamine 30 MG extended release capsule  Commonly known as: Adderall        Total time for discharge is 37 min    Signed:  Electronically signed by Derrick Belle DO on 1/5/2024 at 3:12 PM

## 2024-01-05 NOTE — CARE COORDINATION
Transition of Care-Pulmonary weaned IV steroids to PO today. Patient wears 5L of oxygen at home, she is back at baseline. She will need a nebulizer and CPAP for discharge per Pulmonary. Referral sent to [facility] . Confirmed with [facility] liaison that patient does get oxygen at 5L via their company. Discharge plan is home-no further needs anticipated.    Yun PIERRE, RN  [facility]

## 2024-01-06 LAB
IGA SERPL-MCNC: 197 MG/DL (ref 70–400)
IGG SERPL-MCNC: 946 MG/DL (ref 700–1600)
IGM SERPL-MCNC: 115 MG/DL (ref 40–230)

## 2024-01-07 LAB
MICROORGANISM SPEC CULT: NORMAL
MICROORGANISM SPEC CULT: NORMAL
SERVICE CMNT-IMP: NORMAL
SERVICE CMNT-IMP: NORMAL
SPECIMEN DESCRIPTION: NORMAL
SPECIMEN DESCRIPTION: NORMAL

## 2024-01-08 ENCOUNTER — TELEPHONE (OUTPATIENT)
Dept: FAMILY MEDICINE CLINIC | Age: 59
End: 2024-01-08

## 2024-01-08 ENCOUNTER — CARE COORDINATION (OUTPATIENT)
Dept: CARE COORDINATION | Age: 59
End: 2024-01-08

## 2024-01-08 DIAGNOSIS — J44.1 COPD EXACERBATION (HCC): Primary | ICD-10-CM

## 2024-01-08 LAB
ALPHA-1 ANTITRYPSIN PHENOTYPE: NORMAL
ALPHA-1 ANTITRYPSIN: 167 MG/DL (ref 90–200)

## 2024-01-08 NOTE — CARE COORDINATION
the PCP office for questions related to their healthcare.     Medication reconciliation was performed with patient, who verbalizes understanding of administration of home medications. Medications reviewed, 1111F entered: yes  Non-face-to-face services provided:  Obtained and reviewed discharge summary and/or continuity of care documents  Offered patient enrollment in the Remote Patient Monitoring (RPM) program for in-home monitoring: Patient declined.  Care Transitions 24 Hour Call    Do you have a copy of your discharge instructions?: Yes  Do you have all of your prescriptions and are they filled?: Yes  Have you been contacted by a [facility] Pharmacist?: No  Have you scheduled your follow up appointment?: Yes  How are you going to get to your appointment?: Car - family or friend to transport  Do you feel like you have everything you need to keep you well at home?: Yes  Are you an active caregiver in your home?: No  Care Transitions Interventions  No Identified Needs     Discussed follow-up appointments. Is follow up appointment scheduled within 7 days of discharge? Yes.    Follow Up  Future Appointments   Date Time Provider Department Center   1/11/2024  8:45 AM Marli Patel DO [facility]   1/25/2024 11:30 AM Marli Patel DO [facility]   Plan for next call: symptom management-improvement to SOB/LARRY?   follow-up appointment-[facility] 1/11/24    Deya Dwyer RN

## 2024-01-08 NOTE — TELEPHONE ENCOUNTER
Tamiko calling for a Hospital Follow up for continued breathing problems. [facility] and needs a Hospital Follow Up.     I don't see an opening. Please advise when you can see her.

## 2024-01-11 ENCOUNTER — OFFICE VISIT (OUTPATIENT)
Dept: FAMILY MEDICINE CLINIC | Age: 59
End: 2024-01-11

## 2024-01-11 VITALS
BODY MASS INDEX: 45.8 KG/M2 | WEIGHT: 242.6 LBS | HEART RATE: 90 BPM | DIASTOLIC BLOOD PRESSURE: 86 MMHG | HEIGHT: 61 IN | OXYGEN SATURATION: 93 % | SYSTOLIC BLOOD PRESSURE: 116 MMHG | TEMPERATURE: 97.5 F

## 2024-01-11 DIAGNOSIS — Z09 HOSPITAL DISCHARGE FOLLOW-UP: ICD-10-CM

## 2024-01-11 DIAGNOSIS — F32.1 CURRENT MODERATE EPISODE OF MAJOR DEPRESSIVE DISORDER WITHOUT PRIOR EPISODE (HCC): ICD-10-CM

## 2024-01-11 DIAGNOSIS — J44.1 ACUTE EXACERBATION OF CHRONIC OBSTRUCTIVE PULMONARY DISEASE (COPD) (HCC): Primary | ICD-10-CM

## 2024-01-11 DIAGNOSIS — J41.1 MUCOPURULENT CHRONIC BRONCHITIS (HCC): ICD-10-CM

## 2024-01-11 DIAGNOSIS — I10 PRIMARY HYPERTENSION: ICD-10-CM

## 2024-01-11 DIAGNOSIS — R73.03 PREDIABETES: ICD-10-CM

## 2024-01-11 ASSESSMENT — ENCOUNTER SYMPTOMS
NAUSEA: 0
DIARRHEA: 0
COUGH: 1
BACK PAIN: 0
SORE THROAT: 0
WHEEZING: 0
VOMITING: 0
EYE PAIN: 0
ABDOMINAL PAIN: 0
CHEST TIGHTNESS: 0
STRIDOR: 0
TROUBLE SWALLOWING: 0
CONSTIPATION: 0
SINUS PAIN: 0
SHORTNESS OF BREATH: 1

## 2024-01-11 NOTE — PROGRESS NOTES
CLINICAL PHARMACY NOTE: MEDS TO BEDS    Total # of Prescriptions Filled: 1   The following medications were delivered to the patient:  Prednisone 10 mg    Additional Documentation:

## 2024-01-11 NOTE — PROGRESS NOTES
Post-Discharge Transitional Care Follow Up      Tamiko Ch   YOB: 1965    Date of Office Visit:  1/11/2024  Date of Hospital Admission: 1/3/24  Date of Hospital Discharge: 1/5/24  Readmission Risk Score (high >=14%. Medium >=10%):Readmission Risk Score: 12.3      Care management risk score Rising risk (score 2-5) and Complex Care (Scores >=6): No Risk Score On File     Non face to face  following discharge, date last encounter closed (first attempt may have been earlier): 01/08/2024     Call initiated 2 business days of discharge: Yes     Acute exacerbation of chronic obstructive pulmonary disease (COPD) (HCC)  Comments:  currently oxygen demands are higher, @ 7L she will try to decrease down to 5L  referred to pulm rehab, she wants to try and go back to work monday  Orders:  -     OR DISCHARGE MEDS RECONCILED W/ CURRENT OUTPATIENT MED LIST  Current moderate episode of major depressive disorder without prior episode (HCC)  Comments:  she will call pulmonary for f/u  gave her the no to sched pulm rehab they called her  she needs to exercise lungs, quit smoking for now  Orders:  -     OR DISCHARGE MEDS RECONCILED W/ CURRENT OUTPATIENT MED LIST  Primary hypertension  Comments:  readigns good, continue losartan  no changes  Orders:  -     OR DISCHARGE MEDS RECONCILED W/ CURRENT OUTPATIENT MED LIST  Prediabetes  Comments:  has been on steroid a lot with copd, will have to watch a1c, last one was 5.9%  Orders:  -     OR DISCHARGE MEDS RECONCILED W/ CURRENT OUTPATIENT MED LIST  Hospital discharge follow-up  -     OR DISCHARGE MEDS RECONCILED W/ CURRENT OUTPATIENT MED LIST  Mucopurulent chronic bronchitis (HCC)  -     OR DISCHARGE MEDS RECONCILED W/ CURRENT OUTPATIENT MED LIST      Medical Decision Making: high complexity  Return in 2 weeks (on 1/25/2024).    On this date 1/11/2024 I have spent 45 minutes reviewing previous notes, test results and face to face with the patient discussing the diagnosis

## 2024-01-16 ENCOUNTER — CARE COORDINATION (OUTPATIENT)
Dept: CARE COORDINATION | Age: 59
End: 2024-01-16

## 2024-01-16 NOTE — CARE COORDINATION
Care Transitions Follow Up Call    Patient: Tamiko Ch  Patient : 1965   MRN: [id number]  Reason for Admission: COPD exac  Discharge Date: 24 RARS: Readmission Risk Score: 12.3    Attempted to reach the patient for subsequent Care Transition call. HIPAA compliant message left with CTN's contact information requesting return phone call.    Follow Up  Future Appointments   Date Time Provider Department Center   2024 11:30 AM Marli Patel DO [facility]     Deya Dwyer RN

## 2024-01-23 ENCOUNTER — TELEPHONE (OUTPATIENT)
Dept: PULMONOLOGY | Age: 59
End: 2024-01-23

## 2024-01-23 ENCOUNTER — CARE COORDINATION (OUTPATIENT)
Dept: CARE COORDINATION | Age: 59
End: 2024-01-23

## 2024-01-23 NOTE — CARE COORDINATION
Care Transitions Follow Up Call    Patient Current Location:  [place]    Care Transition Nurse contacted the patient by telephone to follow up after admission on 24.  Verified name and  with patient as identifiers.    Patient: Tamiko Ch  Patient : 1965   MRN: [id number]  Reason for Admission: COPD exac  Discharge Date: 24 RARS: Readmission Risk Score: 12.3    Spoke with Tamiko for final follow up care transition call. She reports feeling \"ok\" today. She returned to work last week, she denies any SOB at rest and does have some LARRY still. She is wearing 3L O2 at all times during the day and 5L O2 via her CPAP at night. She reports her pulse ox reading are \"good\".   Tamiko continues to refrain from smoking, CTN applauded her hard work.   Tamiko denies any needs, questions, or concerns at this time and is agreeable with this being the final outreach.     Follow Up  Future Appointments   Date Time Provider Department Center   2024 11:30 AM Marli Patel, DO [facility]      Care Transitions Subsequent and Final Call    Subsequent and Final Calls  Do you have any ongoing symptoms?: No  Have your medications changed?: No  Do you have any questions related to your medications?: No  Do you currently have any active services?: No  Do you have any needs or concerns that I can assist you with?: No  Identified Barriers: None  Care Transitions Interventions  No Identified Needs  Other Interventions:           Deya Dwyer RN

## 2024-01-25 ENCOUNTER — OFFICE VISIT (OUTPATIENT)
Dept: FAMILY MEDICINE CLINIC | Age: 59
End: 2024-01-25
Payer: COMMERCIAL

## 2024-01-25 VITALS
WEIGHT: 243 LBS | DIASTOLIC BLOOD PRESSURE: 78 MMHG | SYSTOLIC BLOOD PRESSURE: 110 MMHG | HEIGHT: 61 IN | TEMPERATURE: 98.2 F | HEART RATE: 106 BPM | BODY MASS INDEX: 45.88 KG/M2 | OXYGEN SATURATION: 99 %

## 2024-01-25 DIAGNOSIS — R55 SYNCOPE, UNSPECIFIED SYNCOPE TYPE: Primary | ICD-10-CM

## 2024-01-25 DIAGNOSIS — J44.9 CHRONIC OBSTRUCTIVE PULMONARY DISEASE, UNSPECIFIED COPD TYPE (HCC): ICD-10-CM

## 2024-01-25 DIAGNOSIS — G62.89 OTHER POLYNEUROPATHY: ICD-10-CM

## 2024-01-25 DIAGNOSIS — R53.1 WEAKNESS: ICD-10-CM

## 2024-01-25 DIAGNOSIS — I10 PRIMARY HYPERTENSION: ICD-10-CM

## 2024-01-25 DIAGNOSIS — F90.0 ATTENTION DEFICIT HYPERACTIVITY DISORDER (ADHD), PREDOMINANTLY INATTENTIVE TYPE: ICD-10-CM

## 2024-01-25 DIAGNOSIS — R73.03 PREDIABETES: ICD-10-CM

## 2024-01-25 DIAGNOSIS — G25.81 RESTLESS LEGS SYNDROME: ICD-10-CM

## 2024-01-25 DIAGNOSIS — E78.2 MIXED HYPERLIPIDEMIA: ICD-10-CM

## 2024-01-25 PROCEDURE — 3078F DIAST BP <80 MM HG: CPT | Performed by: FAMILY MEDICINE

## 2024-01-25 PROCEDURE — 3074F SYST BP LT 130 MM HG: CPT | Performed by: FAMILY MEDICINE

## 2024-01-25 PROCEDURE — 99214 OFFICE O/P EST MOD 30 MIN: CPT | Performed by: FAMILY MEDICINE

## 2024-01-25 RX ORDER — DEXTROAMPHETAMINE SACCHARATE, AMPHETAMINE ASPARTATE MONOHYDRATE, DEXTROAMPHETAMINE SULFATE AND AMPHETAMINE SULFATE 7.5; 7.5; 7.5; 7.5 MG/1; MG/1; MG/1; MG/1
30 CAPSULE, EXTENDED RELEASE ORAL DAILY
Qty: 30 CAPSULE | Refills: 0 | Status: SHIPPED | OUTPATIENT
Start: 2024-01-25 | End: 2024-02-24

## 2024-01-25 RX ORDER — DEXTROAMPHETAMINE SACCHARATE, AMPHETAMINE ASPARTATE MONOHYDRATE, DEXTROAMPHETAMINE SULFATE AND AMPHETAMINE SULFATE 7.5; 7.5; 7.5; 7.5 MG/1; MG/1; MG/1; MG/1
30 CAPSULE, EXTENDED RELEASE ORAL DAILY
Qty: 30 CAPSULE | Refills: 0 | Status: SHIPPED | OUTPATIENT
Start: 2024-02-23 | End: 2024-03-24

## 2024-01-25 RX ORDER — DEXTROAMPHETAMINE SACCHARATE, AMPHETAMINE ASPARTATE MONOHYDRATE, DEXTROAMPHETAMINE SULFATE AND AMPHETAMINE SULFATE 7.5; 7.5; 7.5; 7.5 MG/1; MG/1; MG/1; MG/1
30 CAPSULE, EXTENDED RELEASE ORAL DAILY
Qty: 30 CAPSULE | Refills: 0 | Status: SHIPPED | OUTPATIENT
Start: 2024-03-21 | End: 2024-04-20

## 2024-01-25 RX ORDER — PREGABALIN 75 MG/1
75 CAPSULE ORAL 3 TIMES DAILY
Qty: 90 CAPSULE | Refills: 2 | Status: SHIPPED | OUTPATIENT
Start: 2024-01-25 | End: 2024-02-24

## 2024-01-25 ASSESSMENT — ENCOUNTER SYMPTOMS
SHORTNESS OF BREATH: 0
CHEST TIGHTNESS: 0
TROUBLE SWALLOWING: 0
VOMITING: 0
COUGH: 0
NAUSEA: 0
SORE THROAT: 0
BACK PAIN: 0
SINUS PAIN: 0
EYE PAIN: 0
ABDOMINAL PAIN: 0
WHEEZING: 0
CONSTIPATION: 0

## 2024-01-25 NOTE — PROGRESS NOTES
24    Name: Tamiko Ch  :1965   Sex:female   Age:58 y.o.    Chief Complaint   Patient presents with    COPD    Loss of Consciousness     Patient presents to office for visit. She is still using 6 lpm via nasal canula with her oxygen at home. Patient uses 5 lpm on her CPAP and 4 lpm on her portable oxygen. Patient says her pulse ox goes down to 60% if she walks to her car without any oxygen on. She is still having episodes where she will black out and then shake a bit. Patient says these episodes only happen when she is standing. She is using trelegy inhaler.     Here for follow up  She is wearing her oxygen but really gets SOB anytime she takes it off  Has not scheduled with pulmonary rehab or pulmonary yet  They have called her to schedule    Also still having the episode where she gets lightheaded, sees black spots and if she does not sit immediately she will black out briefly  She is not checking her blood pressures as we discussed  At this point just stop the losartan all together  She needs to start check blood pressures. But also needs to be drinking enough fluids daily  Will also get CT head and refer to cardiology    Peripheral neuropathy much better with lyrica 3 times a day  Likely related to her smoking  Peripheral vascular disease  Will continue it is really helping, has also stopped her Restless legs at night    ADHD  On adderall  Doing well  Still working and that really has helped with work'  Not nearly as fatigued as previously    Depression seems stable  Off the wellbutrin though just to be sure these are not seizure related  On the cymbalta twice  a day  Continue this        Review of Systems   Constitutional:  Negative for appetite change, fatigue and fever.   HENT:  Negative for congestion, ear pain, sinus pain, sore throat and trouble swallowing.    Eyes:  Negative for pain.   Respiratory:  Negative for cough, chest tightness, shortness of breath and wheezing.    Cardiovascular:

## 2024-02-06 ENCOUNTER — TELEPHONE (OUTPATIENT)
Dept: ADMINISTRATIVE | Age: 59
End: 2024-02-06

## 2024-02-06 NOTE — TELEPHONE ENCOUNTER
NP returning call to schedule with Cardiology.    Patient Appointment Form:      PCP: Dr. Patel  Referring: Dr. Patel    Has the Patient:    Seen a Cardiologist? no    Had a heart catheterization? no    Had heart surgery? no    Had a stress test or nuclear stress test? no    Had an echocardiogram? no    Had a vascular ultrasound? no    Had a 24/48 heart monitor or extended cardiac event monitor? no    Had recent blood work in the last 6 months? Yes PCP Epic     Had a pacemaker/ICD/ILR implant? no    Seen an Electrophysiologist? no        Will send records via: No prior Cardiology hx or recs - PCP recs in Epic       Date & time of appointment:  2/22/24 @ 3:00 with Dr. Matute

## 2024-02-19 RX ORDER — FLUTICASONE FUROATE, UMECLIDINIUM BROMIDE AND VILANTEROL TRIFENATATE 200; 62.5; 25 UG/1; UG/1; UG/1
POWDER RESPIRATORY (INHALATION)
Qty: 60 EACH | Refills: 2 | Status: SHIPPED | OUTPATIENT
Start: 2024-02-19

## 2024-04-12 DIAGNOSIS — R73.03 PREDIABETES: ICD-10-CM

## 2024-04-12 DIAGNOSIS — E78.2 MIXED HYPERLIPIDEMIA: ICD-10-CM

## 2024-04-12 DIAGNOSIS — I10 PRIMARY HYPERTENSION: ICD-10-CM

## 2024-04-12 LAB
ALBUMIN SERPL-MCNC: 3.8 G/DL (ref 3.5–5.2)
ALP BLD-CCNC: 131 U/L (ref 35–104)
ALT SERPL-CCNC: 21 U/L (ref 0–32)
ANION GAP SERPL CALCULATED.3IONS-SCNC: 11 MMOL/L (ref 7–16)
AST SERPL-CCNC: 17 U/L (ref 0–31)
BASOPHILS ABSOLUTE: 0.04 K/UL (ref 0–0.2)
BASOPHILS RELATIVE PERCENT: 1 % (ref 0–2)
BILIRUB SERPL-MCNC: 0.2 MG/DL (ref 0–1.2)
BUN BLDV-MCNC: 11 MG/DL (ref 6–23)
CALCIUM SERPL-MCNC: 9.7 MG/DL (ref 8.6–10.2)
CHLORIDE BLD-SCNC: 99 MMOL/L (ref 98–107)
CHOLESTEROL: 242 MG/DL
CO2: 29 MMOL/L (ref 22–29)
CREAT SERPL-MCNC: 0.7 MG/DL (ref 0.5–1)
EOSINOPHILS ABSOLUTE: 0.25 K/UL (ref 0.05–0.5)
EOSINOPHILS RELATIVE PERCENT: 4 % (ref 0–6)
GFR SERPL CREATININE-BSD FRML MDRD: >90 ML/MIN/1.73M2
GLUCOSE BLD-MCNC: 101 MG/DL (ref 74–99)
HBA1C MFR BLD: 5.4 % (ref 4–5.6)
HCT VFR BLD CALC: 46.2 % (ref 34–48)
HDLC SERPL-MCNC: 46 MG/DL
HEMOGLOBIN: 15.1 G/DL (ref 11.5–15.5)
IMMATURE GRANULOCYTES %: 0 % (ref 0–5)
IMMATURE GRANULOCYTES ABSOLUTE: <0.03 K/UL (ref 0–0.58)
LDL CHOLESTEROL: 143 MG/DL
LYMPHOCYTES ABSOLUTE: 1.63 K/UL (ref 1.5–4)
LYMPHOCYTES RELATIVE PERCENT: 24 % (ref 20–42)
MCH RBC QN AUTO: 32.3 PG (ref 26–35)
MCHC RBC AUTO-ENTMCNC: 32.7 G/DL (ref 32–34.5)
MCV RBC AUTO: 98.9 FL (ref 80–99.9)
MONOCYTES ABSOLUTE: 0.33 K/UL (ref 0.1–0.95)
MONOCYTES RELATIVE PERCENT: 5 % (ref 2–12)
NEUTROPHILS ABSOLUTE: 4.48 K/UL (ref 1.8–7.3)
NEUTROPHILS RELATIVE PERCENT: 67 % (ref 43–80)
PDW BLD-RTO: 14.6 % (ref 11.5–15)
PLATELET # BLD: 254 K/UL (ref 130–450)
PMV BLD AUTO: 10.4 FL (ref 7–12)
POTASSIUM SERPL-SCNC: 4.4 MMOL/L (ref 3.5–5)
RBC # BLD: 4.67 M/UL (ref 3.5–5.5)
SODIUM BLD-SCNC: 139 MMOL/L (ref 132–146)
TOTAL PROTEIN: 7 G/DL (ref 6.4–8.3)
TRIGL SERPL-MCNC: 263 MG/DL
VLDLC SERPL CALC-MCNC: 53 MG/DL
WBC # BLD: 6.7 K/UL (ref 4.5–11.5)

## 2024-04-15 DIAGNOSIS — I10 PRIMARY HYPERTENSION: ICD-10-CM

## 2024-04-15 LAB
CREATININE URINE: 52.6 MG/DL (ref 29–226)
MICROALBUMIN/CREAT 24H UR: 74 MG/L (ref 0–19)
MICROALBUMIN/CREAT UR-RTO: 140 MCG/MG CREAT (ref 0–30)

## 2024-04-17 ENCOUNTER — OFFICE VISIT (OUTPATIENT)
Dept: FAMILY MEDICINE CLINIC | Age: 59
End: 2024-04-17
Payer: COMMERCIAL

## 2024-04-17 VITALS
SYSTOLIC BLOOD PRESSURE: 118 MMHG | TEMPERATURE: 98.2 F | WEIGHT: 251 LBS | OXYGEN SATURATION: 94 % | HEART RATE: 88 BPM | BODY MASS INDEX: 47.39 KG/M2 | HEIGHT: 61 IN | DIASTOLIC BLOOD PRESSURE: 72 MMHG

## 2024-04-17 DIAGNOSIS — M1A.00X0 IDIOPATHIC CHRONIC GOUT WITHOUT TOPHUS, UNSPECIFIED SITE: ICD-10-CM

## 2024-04-17 DIAGNOSIS — F32.1 CURRENT MODERATE EPISODE OF MAJOR DEPRESSIVE DISORDER WITHOUT PRIOR EPISODE (HCC): Primary | ICD-10-CM

## 2024-04-17 DIAGNOSIS — F90.0 ATTENTION DEFICIT HYPERACTIVITY DISORDER (ADHD), PREDOMINANTLY INATTENTIVE TYPE: ICD-10-CM

## 2024-04-17 DIAGNOSIS — E78.2 MIXED HYPERLIPIDEMIA: ICD-10-CM

## 2024-04-17 DIAGNOSIS — G62.89 OTHER POLYNEUROPATHY: ICD-10-CM

## 2024-04-17 DIAGNOSIS — J41.8 MIXED SIMPLE AND MUCOPURULENT CHRONIC BRONCHITIS (HCC): ICD-10-CM

## 2024-04-17 DIAGNOSIS — G25.81 RESTLESS LEGS SYNDROME: ICD-10-CM

## 2024-04-17 DIAGNOSIS — R60.0 EDEMA OF BOTH FEET: ICD-10-CM

## 2024-04-17 PROCEDURE — 99214 OFFICE O/P EST MOD 30 MIN: CPT | Performed by: FAMILY MEDICINE

## 2024-04-17 PROCEDURE — 3078F DIAST BP <80 MM HG: CPT | Performed by: FAMILY MEDICINE

## 2024-04-17 PROCEDURE — 3074F SYST BP LT 130 MM HG: CPT | Performed by: FAMILY MEDICINE

## 2024-04-17 RX ORDER — PREGABALIN 75 MG/1
75 CAPSULE ORAL 3 TIMES DAILY
Qty: 90 CAPSULE | Refills: 2 | Status: SHIPPED | OUTPATIENT
Start: 2024-04-17 | End: 2024-05-17

## 2024-04-17 RX ORDER — ROSUVASTATIN CALCIUM 20 MG/1
20 TABLET, COATED ORAL DAILY
Qty: 30 TABLET | Refills: 5 | Status: SHIPPED | OUTPATIENT
Start: 2024-04-17

## 2024-04-17 RX ORDER — DEXTROAMPHETAMINE SACCHARATE, AMPHETAMINE ASPARTATE MONOHYDRATE, DEXTROAMPHETAMINE SULFATE AND AMPHETAMINE SULFATE 7.5; 7.5; 7.5; 7.5 MG/1; MG/1; MG/1; MG/1
30 CAPSULE, EXTENDED RELEASE ORAL DAILY
Qty: 30 CAPSULE | Refills: 0 | Status: SHIPPED | OUTPATIENT
Start: 2024-04-17 | End: 2024-05-17

## 2024-04-17 RX ORDER — FAMOTIDINE 20 MG/1
20 TABLET, FILM COATED ORAL 2 TIMES DAILY
Qty: 60 TABLET | Refills: 5 | Status: SHIPPED | OUTPATIENT
Start: 2024-04-17

## 2024-04-17 RX ORDER — ALBUTEROL SULFATE 90 UG/1
2 AEROSOL, METERED RESPIRATORY (INHALATION) 4 TIMES DAILY PRN
Qty: 18 G | Refills: 2 | Status: SHIPPED | OUTPATIENT
Start: 2024-04-17

## 2024-04-17 RX ORDER — DEXTROAMPHETAMINE SACCHARATE, AMPHETAMINE ASPARTATE MONOHYDRATE, DEXTROAMPHETAMINE SULFATE AND AMPHETAMINE SULFATE 7.5; 7.5; 7.5; 7.5 MG/1; MG/1; MG/1; MG/1
30 CAPSULE, EXTENDED RELEASE ORAL DAILY
Qty: 30 CAPSULE | Refills: 0 | Status: SHIPPED | OUTPATIENT
Start: 2024-06-15 | End: 2024-07-15

## 2024-04-17 RX ORDER — GUAIFENESIN 400 MG/1
400 TABLET ORAL 3 TIMES DAILY
Qty: 90 TABLET | Refills: 1 | Status: SHIPPED | OUTPATIENT
Start: 2024-04-17

## 2024-04-17 RX ORDER — DEXTROAMPHETAMINE SACCHARATE, AMPHETAMINE ASPARTATE MONOHYDRATE, DEXTROAMPHETAMINE SULFATE AND AMPHETAMINE SULFATE 7.5; 7.5; 7.5; 7.5 MG/1; MG/1; MG/1; MG/1
30 CAPSULE, EXTENDED RELEASE ORAL DAILY
Qty: 30 CAPSULE | Refills: 0 | Status: SHIPPED | OUTPATIENT
Start: 2024-05-16 | End: 2024-06-15

## 2024-04-17 RX ORDER — FLUTICASONE FUROATE, UMECLIDINIUM BROMIDE AND VILANTEROL TRIFENATATE 200; 62.5; 25 UG/1; UG/1; UG/1
POWDER RESPIRATORY (INHALATION)
Qty: 60 EACH | Refills: 2 | Status: SHIPPED | OUTPATIENT
Start: 2024-04-17

## 2024-04-17 RX ORDER — ALLOPURINOL 300 MG/1
TABLET ORAL
Qty: 30 TABLET | Refills: 5 | Status: SHIPPED | OUTPATIENT
Start: 2024-04-17

## 2024-04-17 RX ORDER — DULOXETIN HYDROCHLORIDE 60 MG/1
60 CAPSULE, DELAYED RELEASE ORAL 2 TIMES DAILY
Qty: 60 CAPSULE | Refills: 5 | Status: SHIPPED | OUTPATIENT
Start: 2024-04-17

## 2024-04-17 ASSESSMENT — ENCOUNTER SYMPTOMS
CHEST TIGHTNESS: 0
ABDOMINAL PAIN: 0
SORE THROAT: 0
SHORTNESS OF BREATH: 0
CONSTIPATION: 0
TROUBLE SWALLOWING: 0
WHEEZING: 0
BACK PAIN: 0
DIARRHEA: 0
NAUSEA: 0
EYE PAIN: 0
SINUS PAIN: 0
VOMITING: 0
COUGH: 0

## 2024-04-17 ASSESSMENT — PATIENT HEALTH QUESTIONNAIRE - PHQ9
5. POOR APPETITE OR OVEREATING: NOT AT ALL
4. FEELING TIRED OR HAVING LITTLE ENERGY: NEARLY EVERY DAY
SUM OF ALL RESPONSES TO PHQ QUESTIONS 1-9: 7
9. THOUGHTS THAT YOU WOULD BE BETTER OFF DEAD, OR OF HURTING YOURSELF: NOT AT ALL
7. TROUBLE CONCENTRATING ON THINGS, SUCH AS READING THE NEWSPAPER OR WATCHING TELEVISION: NOT AT ALL
6. FEELING BAD ABOUT YOURSELF - OR THAT YOU ARE A FAILURE OR HAVE LET YOURSELF OR YOUR FAMILY DOWN: NOT AT ALL
8. MOVING OR SPEAKING SO SLOWLY THAT OTHER PEOPLE COULD HAVE NOTICED. OR THE OPPOSITE, BEING SO FIGETY OR RESTLESS THAT YOU HAVE BEEN MOVING AROUND A LOT MORE THAN USUAL: NOT AT ALL
2. FEELING DOWN, DEPRESSED OR HOPELESS: SEVERAL DAYS
1. LITTLE INTEREST OR PLEASURE IN DOING THINGS: MORE THAN HALF THE DAYS
SUM OF ALL RESPONSES TO PHQ9 QUESTIONS 1 & 2: 3
SUM OF ALL RESPONSES TO PHQ QUESTIONS 1-9: 7
3. TROUBLE FALLING OR STAYING ASLEEP: SEVERAL DAYS
10. IF YOU CHECKED OFF ANY PROBLEMS, HOW DIFFICULT HAVE THESE PROBLEMS MADE IT FOR YOU TO DO YOUR WORK, TAKE CARE OF THINGS AT HOME, OR GET ALONG WITH OTHER PEOPLE: SOMEWHAT DIFFICULT

## 2024-04-17 NOTE — PROGRESS NOTES
pain, constipation, diarrhea, nausea and vomiting.   Endocrine: Negative for cold intolerance and heat intolerance.   Genitourinary:  Negative for difficulty urinating, hematuria and pelvic pain.   Musculoskeletal:  Negative for back pain, gait problem and joint swelling.   Skin:  Negative for rash and wound.   Neurological:  Negative for dizziness, syncope and headaches.   Hematological:  Negative for adenopathy.   Psychiatric/Behavioral:  Positive for dysphoric mood. Negative for confusion, sleep disturbance and suicidal ideas.            Current Outpatient Medications:     fluticasone-umeclidin-vilant (TRELEGY ELLIPTA) 200-62.5-25 MCG/ACT AEPB inhaler, inhale 1 puff INTO THE LUNGS once daily, Disp: 60 each, Rfl: 2    rosuvastatin (CRESTOR) 20 MG tablet, Take 1 tablet by mouth daily, Disp: 30 tablet, Rfl: 5    guaiFENesin 400 MG tablet, Take 1 tablet by mouth in the morning, at noon, and at bedtime, Disp: 90 tablet, Rfl: 1    famotidine (PEPCID) 20 MG tablet, Take 1 tablet by mouth 2 times daily, Disp: 60 tablet, Rfl: 5    DULoxetine (CYMBALTA) 60 MG extended release capsule, Take 1 capsule by mouth 2 times daily, Disp: 60 capsule, Rfl: 5    albuterol sulfate HFA (VENTOLIN HFA) 108 (90 Base) MCG/ACT inhaler, Inhale 2 puffs into the lungs 4 times daily as needed for Wheezing, Disp: 18 g, Rfl: 2    pregabalin (LYRICA) 75 MG capsule, Take 1 capsule by mouth 3 times daily for 30 days. Max Daily Amount: 225 mg, Disp: 90 capsule, Rfl: 2    allopurinol (ZYLOPRIM) 300 MG tablet, take 1 tablet by mouth once daily, Disp: 30 tablet, Rfl: 5    [START ON 6/15/2024] amphetamine-dextroamphetamine (ADDERALL XR) 30 MG extended release capsule, Take 1 capsule by mouth daily for 30 days., Disp: 30 capsule, Rfl: 0    [START ON 5/16/2024] amphetamine-dextroamphetamine (ADDERALL XR) 30 MG extended release capsule, Take 1 capsule by mouth daily for 30 days. Max Daily Amount: 30 mg, Disp: 30 capsule, Rfl: 0

## 2024-04-19 ENCOUNTER — OFFICE VISIT (OUTPATIENT)
Dept: CARDIOLOGY CLINIC | Age: 59
End: 2024-04-19
Payer: COMMERCIAL

## 2024-04-19 VITALS — WEIGHT: 253 LBS | BODY MASS INDEX: 47.77 KG/M2 | HEIGHT: 61 IN

## 2024-04-19 DIAGNOSIS — R55 SYNCOPE, UNSPECIFIED SYNCOPE TYPE: ICD-10-CM

## 2024-04-19 DIAGNOSIS — R60.0 LOWER EXTREMITY EDEMA: ICD-10-CM

## 2024-04-19 DIAGNOSIS — I25.10 CORONARY ARTERY DISEASE, UNSPECIFIED VESSEL OR LESION TYPE, UNSPECIFIED WHETHER ANGINA PRESENT, UNSPECIFIED WHETHER NATIVE OR TRANSPLANTED HEART: Primary | ICD-10-CM

## 2024-04-19 PROCEDURE — 99203 OFFICE O/P NEW LOW 30 MIN: CPT | Performed by: INTERNAL MEDICINE

## 2024-04-19 PROCEDURE — 93000 ELECTROCARDIOGRAM COMPLETE: CPT | Performed by: INTERNAL MEDICINE

## 2024-04-19 RX ORDER — M-VIT,TX,IRON,MINS/CALC/FOLIC 27MG-0.4MG
1 TABLET ORAL DAILY
COMMUNITY

## 2024-04-19 RX ORDER — FUROSEMIDE 20 MG/1
20 TABLET ORAL DAILY
Qty: 90 TABLET | Refills: 1 | Status: SHIPPED | OUTPATIENT
Start: 2024-04-19

## 2024-04-19 RX ORDER — POTASSIUM CHLORIDE 20 MEQ/1
20 TABLET, EXTENDED RELEASE ORAL DAILY
Qty: 30 TABLET | Refills: 5 | Status: SHIPPED | OUTPATIENT
Start: 2024-04-19

## 2024-04-19 ASSESSMENT — ENCOUNTER SYMPTOMS
BACK PAIN: 0
COUGH: 0
WHEEZING: 0
ABDOMINAL PAIN: 0
NAUSEA: 0
DIARRHEA: 0
CONSTIPATION: 0
BLOOD IN STOOL: 0
VOMITING: 0
SHORTNESS OF BREATH: 1

## 2024-04-19 NOTE — PROGRESS NOTES
Patient was seen today and a 14 day xt monitor was placed. Monitor was ordered by Dr. ross. The monitor was applied, instructions were given to the patient. Patient stated understanding and gave verbalize readback.   Monitor company: [employer]  Serial number: [id number]  
capsule by mouth daily for 30 days. Max Daily Amount: 30 mg (Patient not taking: Reported on 4/19/2024) 30 capsule 0    Handicap Placard MISC by Does not apply route Duration:  5 years 1 each 0     No current facility-administered medications for this visit.       Physical Exam:  Ht 1.549 m (5' 1\")   Wt 114.8 kg (253 lb)   BMI 47.80 kg/m²   Wt Readings from Last 3 Encounters:   04/19/24 114.8 kg (253 lb)   04/17/24 113.9 kg (251 lb)   01/25/24 110.2 kg (243 lb)     Physical Exam:  Ht 1.549 m (5' 1\")   Wt 114.8 kg (253 lb)   BMI 47.80 kg/m²   Wt Readings from Last 3 Encounters:   04/19/24 114.8 kg (253 lb)   04/17/24 113.9 kg (251 lb)   01/25/24 110.2 kg (243 lb)     Physical Exam  Constitutional:       General: She is not in acute distress.     Appearance: She is well-developed. She is obese.   HENT:      Head: Normocephalic and atraumatic.   Neck:      Vascular: No carotid bruit or JVD.   Cardiovascular:      Rate and Rhythm: Normal rate and regular rhythm.      Heart sounds: No murmur heard.     No friction rub. No gallop.      Comments: Distant heart sounds.  Pulmonary:      Breath sounds: No wheezing or rales.      Comments: Decreased air entry bilaterally with no wheezing or crackles.  Chest:      Chest wall: No tenderness.   Abdominal:      General: Bowel sounds are normal. There is no distension.      Palpations: Abdomen is soft. There is no mass.      Tenderness: There is no abdominal tenderness.   Musculoskeletal:      Cervical back: Neck supple.      Right lower leg: Edema present.      Left lower leg: Edema present.      Comments: Bilateral tensed leg edema.   Skin:     General: Skin is warm and dry.   Neurological:      Mental Status: She is alert and oriented to person, place, and time.          Laboratory Tests:  Lab Results   Component Value Date    CREATININE 0.7 04/12/2024    BUN 11 04/12/2024     04/12/2024    K 4.4 04/12/2024    CL 99 04/12/2024    CO2 29 04/12/2024     Lab Results

## 2024-05-28 DIAGNOSIS — R55 SYNCOPE, UNSPECIFIED SYNCOPE TYPE: ICD-10-CM

## 2024-06-10 RX ORDER — LOSARTAN POTASSIUM 50 MG/1
50 TABLET ORAL DAILY
Qty: 90 TABLET | Refills: 1 | OUTPATIENT
Start: 2024-06-10

## 2024-06-14 ENCOUNTER — TELEPHONE (OUTPATIENT)
Dept: CARDIOLOGY | Age: 59
End: 2024-06-14

## 2024-06-14 ENCOUNTER — TELEPHONE (OUTPATIENT)
Dept: CARDIOLOGY CLINIC | Age: 59
End: 2024-06-14

## 2024-06-14 RX ORDER — METOPROLOL SUCCINATE 25 MG/1
25 TABLET, EXTENDED RELEASE ORAL DAILY
Qty: 30 TABLET | Refills: 5 | Status: SHIPPED | OUTPATIENT
Start: 2024-06-14

## 2024-06-17 ENCOUNTER — TELEPHONE (OUTPATIENT)
Dept: CARDIOLOGY | Age: 59
End: 2024-06-17

## 2024-07-09 ENCOUNTER — TELEMEDICINE (OUTPATIENT)
Dept: FAMILY MEDICINE CLINIC | Age: 59
End: 2024-07-09
Payer: COMMERCIAL

## 2024-07-09 DIAGNOSIS — G62.89 OTHER POLYNEUROPATHY: ICD-10-CM

## 2024-07-09 DIAGNOSIS — F90.0 ATTENTION DEFICIT HYPERACTIVITY DISORDER (ADHD), PREDOMINANTLY INATTENTIVE TYPE: ICD-10-CM

## 2024-07-09 DIAGNOSIS — Z12.11 SCREEN FOR COLON CANCER: ICD-10-CM

## 2024-07-09 DIAGNOSIS — I10 PRIMARY HYPERTENSION: ICD-10-CM

## 2024-07-09 DIAGNOSIS — E78.2 MIXED HYPERLIPIDEMIA: Primary | ICD-10-CM

## 2024-07-09 DIAGNOSIS — Z72.0 TOBACCO USE: ICD-10-CM

## 2024-07-09 DIAGNOSIS — R73.03 PREDIABETES: ICD-10-CM

## 2024-07-09 DIAGNOSIS — J96.22 ACUTE ON CHRONIC RESPIRATORY FAILURE WITH HYPERCAPNIA (HCC): ICD-10-CM

## 2024-07-09 DIAGNOSIS — G25.81 RESTLESS LEGS SYNDROME: Primary | ICD-10-CM

## 2024-07-09 PROBLEM — G62.9 PERIPHERAL NEUROPATHY: Status: ACTIVE | Noted: 2024-07-09

## 2024-07-09 PROCEDURE — 99214 OFFICE O/P EST MOD 30 MIN: CPT | Performed by: FAMILY MEDICINE

## 2024-07-09 RX ORDER — DEXTROAMPHETAMINE SACCHARATE, AMPHETAMINE ASPARTATE MONOHYDRATE, DEXTROAMPHETAMINE SULFATE AND AMPHETAMINE SULFATE 7.5; 7.5; 7.5; 7.5 MG/1; MG/1; MG/1; MG/1
30 CAPSULE, EXTENDED RELEASE ORAL DAILY
Qty: 30 CAPSULE | Refills: 0 | Status: SHIPPED | OUTPATIENT
Start: 2024-07-18 | End: 2024-08-17

## 2024-07-09 RX ORDER — PREGABALIN 75 MG/1
75 CAPSULE ORAL 3 TIMES DAILY
Qty: 90 CAPSULE | Refills: 2 | Status: SHIPPED | OUTPATIENT
Start: 2024-07-09 | End: 2024-08-08

## 2024-07-09 RX ORDER — DEXTROAMPHETAMINE SACCHARATE, AMPHETAMINE ASPARTATE MONOHYDRATE, DEXTROAMPHETAMINE SULFATE AND AMPHETAMINE SULFATE 7.5; 7.5; 7.5; 7.5 MG/1; MG/1; MG/1; MG/1
30 CAPSULE, EXTENDED RELEASE ORAL DAILY
Qty: 30 CAPSULE | Refills: 0 | Status: SHIPPED | OUTPATIENT
Start: 2024-08-16 | End: 2024-09-15

## 2024-07-09 RX ORDER — DEXTROAMPHETAMINE SACCHARATE, AMPHETAMINE ASPARTATE MONOHYDRATE, DEXTROAMPHETAMINE SULFATE AND AMPHETAMINE SULFATE 7.5; 7.5; 7.5; 7.5 MG/1; MG/1; MG/1; MG/1
30 CAPSULE, EXTENDED RELEASE ORAL DAILY
Qty: 30 CAPSULE | Refills: 0 | Status: SHIPPED | OUTPATIENT
Start: 2024-09-13 | End: 2024-10-13

## 2024-07-09 RX ORDER — NICOTINE 21 MG/24HR
1 PATCH, TRANSDERMAL 24 HOURS TRANSDERMAL EVERY 24 HOURS
Qty: 30 PATCH | Refills: 3 | Status: SHIPPED | OUTPATIENT
Start: 2024-07-09 | End: 2025-07-09

## 2024-07-09 ASSESSMENT — ENCOUNTER SYMPTOMS
CHEST TIGHTNESS: 0
DIARRHEA: 0
COUGH: 1
ABDOMINAL PAIN: 0
BACK PAIN: 0
SHORTNESS OF BREATH: 1
VOMITING: 0
WHEEZING: 0
CONSTIPATION: 0
EYE PAIN: 0
NAUSEA: 0
SINUS PAIN: 0
TROUBLE SWALLOWING: 0
SORE THROAT: 0

## 2024-07-09 NOTE — PROGRESS NOTES
Tamiko Ch, was evaluated through a synchronous (real-time) audio-video encounter. The patient (or guardian if applicable) is aware that this is a billable service, which includes applicable co-pays. This Virtual Visit was conducted with patient's (and/or legal guardian's) consent. Patient identification was verified, and a caregiver was present when appropriate.   The patient was located at Home: [address] [place] [id number]  Provider was located at Facility (Appt Dept): [address] [place] [address]  Confirm you are appropriately licensed, registered, or certified to deliver care in the state where the patient is located as indicated above. If you are not or unsure, please re-schedule the visit: Yes, I confirm.     Tamiko Ch (:  1965) is a Established patient, presenting virtually for evaluation of the following: refill lyrica and adderall    Assessment & Plan   Below is the assessment and plan developed based on review of pertinent history, physical exam, labs, studies, and medications.  1. Restless legs syndrome  Comments:  cont lyrica, resume 75mg tid-she stopped it  legs have been aching a lot lately, this is likely why  Orders:  -     pregabalin (LYRICA) 75 MG capsule; Take 1 capsule by mouth 3 times daily for 30 days. Max Daily Amount: 225 mg, Disp-90 capsule, R-2Normal  2. Other polyneuropathy  Comments:  on lyrica now but has not taken in a few months, refills snet again  now legs are really starting to bother her  Orders:  -     pregabalin (LYRICA) 75 MG capsule; Take 1 capsule by mouth 3 times daily for 30 days. Max Daily Amount: 225 mg, Disp-90 capsule, R-2Normal  3. Attention deficit hyperactivity disorder (ADHD), predominantly inattentive type  Comments:  back on the adderall and doing much better  able to work, oarrs reviwed and refill sokay  but now depression is getting worse and that is affecting work  Orders:  -

## 2024-08-12 DIAGNOSIS — F32.1 CURRENT MODERATE EPISODE OF MAJOR DEPRESSIVE DISORDER WITHOUT PRIOR EPISODE (HCC): ICD-10-CM

## 2024-08-12 DIAGNOSIS — M1A.00X0 IDIOPATHIC CHRONIC GOUT WITHOUT TOPHUS, UNSPECIFIED SITE: ICD-10-CM

## 2024-08-12 DIAGNOSIS — E78.2 MIXED HYPERLIPIDEMIA: ICD-10-CM

## 2024-08-12 DIAGNOSIS — G62.89 OTHER POLYNEUROPATHY: ICD-10-CM

## 2024-08-12 DIAGNOSIS — G25.81 RESTLESS LEGS SYNDROME: ICD-10-CM

## 2024-08-12 DIAGNOSIS — R60.0 LOWER EXTREMITY EDEMA: ICD-10-CM

## 2024-08-12 DIAGNOSIS — J41.8 MIXED SIMPLE AND MUCOPURULENT CHRONIC BRONCHITIS (HCC): ICD-10-CM

## 2024-08-12 RX ORDER — PREGABALIN 75 MG/1
75 CAPSULE ORAL 3 TIMES DAILY
Qty: 90 CAPSULE | Refills: 2 | Status: CANCELLED | OUTPATIENT
Start: 2024-08-12 | End: 2024-11-10

## 2024-08-12 RX ORDER — FLUTICASONE FUROATE, UMECLIDINIUM BROMIDE AND VILANTEROL TRIFENATATE 200; 62.5; 25 UG/1; UG/1; UG/1
POWDER RESPIRATORY (INHALATION)
Qty: 60 EACH | Refills: 2 | Status: SHIPPED | OUTPATIENT
Start: 2024-08-12

## 2024-08-12 RX ORDER — DULOXETIN HYDROCHLORIDE 60 MG/1
60 CAPSULE, DELAYED RELEASE ORAL 2 TIMES DAILY
Qty: 60 CAPSULE | Refills: 5 | Status: SHIPPED | OUTPATIENT
Start: 2024-08-12

## 2024-08-12 RX ORDER — ROSUVASTATIN CALCIUM 20 MG/1
20 TABLET, COATED ORAL DAILY
Qty: 30 TABLET | Refills: 5 | Status: SHIPPED | OUTPATIENT
Start: 2024-08-12

## 2024-08-12 RX ORDER — FUROSEMIDE 20 MG/1
20 TABLET ORAL DAILY
Qty: 90 TABLET | Refills: 1 | Status: SHIPPED | OUTPATIENT
Start: 2024-08-12

## 2024-08-12 RX ORDER — ALLOPURINOL 300 MG/1
TABLET ORAL
Qty: 30 TABLET | Refills: 5 | Status: SHIPPED | OUTPATIENT
Start: 2024-08-12

## 2024-08-12 RX ORDER — POTASSIUM CHLORIDE 20 MEQ/1
20 TABLET, EXTENDED RELEASE ORAL DAILY
Qty: 30 TABLET | Refills: 5 | Status: SHIPPED | OUTPATIENT
Start: 2024-08-12

## 2024-08-12 RX ORDER — METOPROLOL SUCCINATE 25 MG/1
25 TABLET, EXTENDED RELEASE ORAL DAILY
Qty: 30 TABLET | Refills: 5 | Status: SHIPPED | OUTPATIENT
Start: 2024-08-12

## 2024-08-12 RX ORDER — FAMOTIDINE 20 MG/1
20 TABLET, FILM COATED ORAL 2 TIMES DAILY
Qty: 60 TABLET | Refills: 5 | Status: SHIPPED | OUTPATIENT
Start: 2024-08-12

## 2024-08-12 NOTE — TELEPHONE ENCOUNTER
Tamiko calling to have her scripts sent to her new pharmacy - [facility] in [place], since [facility] is closing.        She has 8 scripts ready to send:               Last Appointment:  7/9/2024  Future Appointments   Date Time Provider Department Center   10/23/2024  2:30 PM Renee Matute MD [facility]

## 2024-10-07 DIAGNOSIS — I10 PRIMARY HYPERTENSION: ICD-10-CM

## 2024-10-07 DIAGNOSIS — E78.2 MIXED HYPERLIPIDEMIA: ICD-10-CM

## 2024-10-07 DIAGNOSIS — R73.03 PREDIABETES: ICD-10-CM

## 2024-10-07 LAB
ALBUMIN: 4 G/DL (ref 3.5–5.2)
ALP BLD-CCNC: 125 U/L (ref 35–104)
ALT SERPL-CCNC: 17 U/L (ref 0–32)
ANION GAP SERPL CALCULATED.3IONS-SCNC: 11 MMOL/L (ref 7–16)
AST SERPL-CCNC: 20 U/L (ref 0–31)
BASOPHILS ABSOLUTE: 0.03 K/UL (ref 0–0.2)
BASOPHILS RELATIVE PERCENT: 1 % (ref 0–2)
BILIRUB SERPL-MCNC: 0.2 MG/DL (ref 0–1.2)
BUN BLDV-MCNC: 14 MG/DL (ref 6–23)
CALCIUM SERPL-MCNC: 9.8 MG/DL (ref 8.6–10.2)
CHLORIDE BLD-SCNC: 100 MMOL/L (ref 98–107)
CHOLESTEROL, TOTAL: 174 MG/DL
CO2: 31 MMOL/L (ref 22–29)
CREAT SERPL-MCNC: 0.8 MG/DL (ref 0.5–1)
EOSINOPHILS ABSOLUTE: 0.28 K/UL (ref 0.05–0.5)
EOSINOPHILS RELATIVE PERCENT: 4 % (ref 0–6)
GFR, ESTIMATED: 81 ML/MIN/1.73M2
GLUCOSE BLD-MCNC: 108 MG/DL (ref 74–99)
HBA1C MFR BLD: 5.9 % (ref 4–5.6)
HCT VFR BLD CALC: 48.2 % (ref 34–48)
HDLC SERPL-MCNC: 49 MG/DL
HEMOGLOBIN: 15.5 G/DL (ref 11.5–15.5)
IMMATURE GRANULOCYTES %: 0 % (ref 0–5)
IMMATURE GRANULOCYTES ABSOLUTE: <0.03 K/UL (ref 0–0.58)
LDL CHOLESTEROL: 92 MG/DL
LYMPHOCYTES ABSOLUTE: 1.63 K/UL (ref 1.5–4)
LYMPHOCYTES RELATIVE PERCENT: 25 % (ref 20–42)
MCH RBC QN AUTO: 32.6 PG (ref 26–35)
MCHC RBC AUTO-ENTMCNC: 32.2 G/DL (ref 32–34.5)
MCV RBC AUTO: 101.3 FL (ref 80–99.9)
MONOCYTES ABSOLUTE: 0.33 K/UL (ref 0.1–0.95)
MONOCYTES RELATIVE PERCENT: 5 % (ref 2–12)
NEUTROPHILS ABSOLUTE: 4.25 K/UL (ref 1.8–7.3)
NEUTROPHILS RELATIVE PERCENT: 65 % (ref 43–80)
PDW BLD-RTO: 14.2 % (ref 11.5–15)
PLATELET # BLD: 206 K/UL (ref 130–450)
PMV BLD AUTO: 10.5 FL (ref 7–12)
POTASSIUM SERPL-SCNC: 4.5 MMOL/L (ref 3.5–5)
RBC # BLD: 4.76 M/UL (ref 3.5–5.5)
SODIUM BLD-SCNC: 142 MMOL/L (ref 132–146)
TOTAL PROTEIN: 6.9 G/DL (ref 6.4–8.3)
TRIGL SERPL-MCNC: 167 MG/DL
VLDLC SERPL CALC-MCNC: 33 MG/DL
WBC # BLD: 6.5 K/UL (ref 4.5–11.5)

## 2024-10-09 DIAGNOSIS — R73.03 PREDIABETES: ICD-10-CM

## 2024-10-09 LAB
CREATININE URINE: 16.8 MG/DL (ref 29–226)
MICROALBUMIN/CREAT 24H UR: 79 MG/L (ref 0–19)
MICROALBUMIN/CREAT UR-RTO: 468 MCG/MG CREAT (ref 0–30)

## 2024-10-10 ENCOUNTER — OFFICE VISIT (OUTPATIENT)
Dept: FAMILY MEDICINE CLINIC | Age: 59
End: 2024-10-10

## 2024-10-10 VITALS
HEART RATE: 90 BPM | TEMPERATURE: 98.2 F | SYSTOLIC BLOOD PRESSURE: 116 MMHG | OXYGEN SATURATION: 91 % | BODY MASS INDEX: 45.75 KG/M2 | DIASTOLIC BLOOD PRESSURE: 76 MMHG | HEIGHT: 61 IN | WEIGHT: 242.3 LBS

## 2024-10-10 DIAGNOSIS — G62.89 OTHER POLYNEUROPATHY: ICD-10-CM

## 2024-10-10 DIAGNOSIS — Z00.00 ENCOUNTER FOR WELL ADULT EXAM WITHOUT ABNORMAL FINDINGS: Primary | ICD-10-CM

## 2024-10-10 DIAGNOSIS — R73.03 PREDIABETES: ICD-10-CM

## 2024-10-10 DIAGNOSIS — J41.8 MIXED SIMPLE AND MUCOPURULENT CHRONIC BRONCHITIS (HCC): ICD-10-CM

## 2024-10-10 DIAGNOSIS — F90.0 ATTENTION DEFICIT HYPERACTIVITY DISORDER (ADHD), PREDOMINANTLY INATTENTIVE TYPE: ICD-10-CM

## 2024-10-10 DIAGNOSIS — G25.81 RESTLESS LEGS SYNDROME: ICD-10-CM

## 2024-10-10 DIAGNOSIS — Z23 NEED FOR VACCINATION: ICD-10-CM

## 2024-10-10 RX ORDER — PREGABALIN 75 MG/1
75 CAPSULE ORAL 3 TIMES DAILY
Qty: 90 CAPSULE | Refills: 2 | Status: SHIPPED | OUTPATIENT
Start: 2024-10-10 | End: 2024-11-09

## 2024-10-10 RX ORDER — GUAIFENESIN 400 MG/1
400 TABLET ORAL 3 TIMES DAILY
Qty: 90 TABLET | Refills: 2 | Status: SHIPPED | OUTPATIENT
Start: 2024-10-10

## 2024-10-10 RX ORDER — DEXTROAMPHETAMINE SACCHARATE, AMPHETAMINE ASPARTATE MONOHYDRATE, DEXTROAMPHETAMINE SULFATE AND AMPHETAMINE SULFATE 7.5; 7.5; 7.5; 7.5 MG/1; MG/1; MG/1; MG/1
30 CAPSULE, EXTENDED RELEASE ORAL DAILY
Qty: 30 CAPSULE | Refills: 0 | Status: SHIPPED | OUTPATIENT
Start: 2024-12-06 | End: 2025-01-05

## 2024-10-10 RX ORDER — DEXTROAMPHETAMINE SACCHARATE, AMPHETAMINE ASPARTATE MONOHYDRATE, DEXTROAMPHETAMINE SULFATE AND AMPHETAMINE SULFATE 7.5; 7.5; 7.5; 7.5 MG/1; MG/1; MG/1; MG/1
30 CAPSULE, EXTENDED RELEASE ORAL DAILY
Qty: 30 CAPSULE | Refills: 0 | Status: SHIPPED | OUTPATIENT
Start: 2024-11-08 | End: 2024-12-08

## 2024-10-10 RX ORDER — DEXTROAMPHETAMINE SACCHARATE, AMPHETAMINE ASPARTATE MONOHYDRATE, DEXTROAMPHETAMINE SULFATE AND AMPHETAMINE SULFATE 7.5; 7.5; 7.5; 7.5 MG/1; MG/1; MG/1; MG/1
30 CAPSULE, EXTENDED RELEASE ORAL DAILY
Qty: 30 CAPSULE | Refills: 0 | Status: SHIPPED | OUTPATIENT
Start: 2024-10-10 | End: 2024-11-09

## 2024-10-10 ASSESSMENT — ENCOUNTER SYMPTOMS
WHEEZING: 1
ABDOMINAL PAIN: 0
EYE PAIN: 0
CONSTIPATION: 0
NAUSEA: 0
TROUBLE SWALLOWING: 0
CHEST TIGHTNESS: 0
BACK PAIN: 0
SINUS PAIN: 0
DIARRHEA: 0
SHORTNESS OF BREATH: 0
VOMITING: 0
COUGH: 0
SORE THROAT: 0

## 2024-10-10 NOTE — PROGRESS NOTES
10/10/24    Name: Tamiko Ch  :1965   Sex:female   Age:58 y.o.    Chief Complaint   Patient presents with    ADHD    Peripheral Neuropathy     Patient presents to office for visit. Patient says she ran out of Mucinex and has really noticed that it is harder for her to cough mucus up. She uses oxygen through her CPAP machine at night. She still has portable oxygen if needed. Patient's pulse ox did go down to 84% on room air after ambulating from waiting room to exam room. She needs refills on Lyrica and Adderall today. She would like a flu shot.     Here for check up  Shhe has been doing better  Wearing oxygen at night  Rarely during the day now  She is not smoking, rarely has on, she is using nicotine gum and it has helped a lot  No recen hospital stays or visit, no steroids  The mucinex really helps and she would like a refill    Prediabetes  Doing really well  A1c is 5.9  She is down 11 pounds'  Cotinue to really work on diet and she is wlking and moving h=more    Peripheral neuropathy  Likely both diabetic and RVD related  Also restless legs  The lyrica has been a huge help, she really feels it works now that she takes it regularly  Doing really well with it'  Wants to stay on it  Refilled    Adhd  Remains on adderall and it is still effective'  Takes it everyday she works  Most weekends too b/c it gives her some more motivation    Wants her flus shot today  Pneumovax 23 next week              Review of Systems   Constitutional:  Negative for appetite change, fatigue and fever.   HENT:  Negative for congestion, ear pain, sinus pain, sore throat and trouble swallowing.    Eyes:  Negative for pain.   Respiratory:  Positive for wheezing. Negative for cough, chest tightness and shortness of breath.    Cardiovascular:  Negative for chest pain, palpitations and leg swelling.   Gastrointestinal:  Negative for abdominal pain, constipation, diarrhea, nausea and vomiting.   Endocrine: Negative for cold

## 2024-11-03 DIAGNOSIS — J41.8 MIXED SIMPLE AND MUCOPURULENT CHRONIC BRONCHITIS (HCC): ICD-10-CM

## 2024-11-04 RX ORDER — FLUTICASONE FUROATE, UMECLIDINIUM BROMIDE AND VILANTEROL TRIFENATATE 200; 62.5; 25 UG/1; UG/1; UG/1
POWDER RESPIRATORY (INHALATION)
Qty: 60 EACH | Refills: 5 | Status: SHIPPED | OUTPATIENT
Start: 2024-11-04

## 2024-11-11 ENCOUNTER — HOSPITAL ENCOUNTER (INPATIENT)
Age: 59
LOS: 3 days | Discharge: HOME | DRG: 190 | End: 2024-11-14
Attending: EMERGENCY MEDICINE | Admitting: INTERNAL MEDICINE
Payer: COMMERCIAL

## 2024-11-11 ENCOUNTER — OFFICE VISIT (OUTPATIENT)
Dept: FAMILY MEDICINE CLINIC | Age: 59
End: 2024-11-11
Payer: COMMERCIAL

## 2024-11-11 ENCOUNTER — APPOINTMENT (OUTPATIENT)
Dept: GENERAL RADIOLOGY | Age: 59
DRG: 190 | End: 2024-11-11
Payer: COMMERCIAL

## 2024-11-11 VITALS
TEMPERATURE: 98.2 F | WEIGHT: 240 LBS | SYSTOLIC BLOOD PRESSURE: 138 MMHG | RESPIRATION RATE: 18 BRPM | DIASTOLIC BLOOD PRESSURE: 82 MMHG | OXYGEN SATURATION: 96 % | BODY MASS INDEX: 45.31 KG/M2 | HEART RATE: 92 BPM | HEIGHT: 61 IN

## 2024-11-11 DIAGNOSIS — J44.1 ACUTE EXACERBATION OF CHRONIC OBSTRUCTIVE PULMONARY DISEASE (COPD) (HCC): Primary | ICD-10-CM

## 2024-11-11 DIAGNOSIS — J44.1 COPD EXACERBATION (HCC): Primary | ICD-10-CM

## 2024-11-11 PROBLEM — K21.9 GASTROESOPHAGEAL REFLUX DISEASE: Status: ACTIVE | Noted: 2024-11-11

## 2024-11-11 LAB
ALBUMIN SERPL-MCNC: 3.7 G/DL (ref 3.5–5.2)
ALP SERPL-CCNC: 162 U/L (ref 35–104)
ALT SERPL-CCNC: 20 U/L (ref 0–32)
ANION GAP SERPL CALCULATED.3IONS-SCNC: 9 MMOL/L (ref 7–16)
AST SERPL-CCNC: 18 U/L (ref 0–31)
B.E.: 6 MMOL/L (ref -3–3)
BACTERIA URNS QL MICRO: ABNORMAL
BASOPHILS # BLD: 0.05 K/UL (ref 0–0.2)
BASOPHILS NFR BLD: 1 % (ref 0–2)
BILIRUB SERPL-MCNC: 0.4 MG/DL (ref 0–1.2)
BILIRUB UR QL STRIP: ABNORMAL
BNP SERPL-MCNC: 545 PG/ML (ref 0–125)
BUN SERPL-MCNC: 11 MG/DL (ref 6–23)
CALCIUM SERPL-MCNC: 10 MG/DL (ref 8.6–10.2)
CHLORIDE SERPL-SCNC: 98 MMOL/L (ref 98–107)
CLARITY UR: CLEAR
CO2 SERPL-SCNC: 35 MMOL/L (ref 22–29)
COHB: 1.1 % (ref 0–1.5)
COLOR UR: ABNORMAL
CREAT SERPL-MCNC: 0.8 MG/DL (ref 0.5–1)
CRITICAL: ABNORMAL
DATE ANALYZED: ABNORMAL
DATE OF COLLECTION: ABNORMAL
EKG ATRIAL RATE: 85 BPM
EKG P AXIS: 74 DEGREES
EKG P-R INTERVAL: 134 MS
EKG Q-T INTERVAL: 366 MS
EKG QRS DURATION: 78 MS
EKG QTC CALCULATION (BAZETT): 435 MS
EKG R AXIS: 80 DEGREES
EKG T AXIS: 89 DEGREES
EKG VENTRICULAR RATE: 85 BPM
EOSINOPHIL # BLD: 0.15 K/UL (ref 0.05–0.5)
EOSINOPHILS RELATIVE PERCENT: 2 % (ref 0–6)
ERYTHROCYTE [DISTWIDTH] IN BLOOD BY AUTOMATED COUNT: 13.9 % (ref 11.5–15)
GFR, ESTIMATED: 90 ML/MIN/1.73M2
GLUCOSE SERPL-MCNC: 119 MG/DL (ref 74–99)
GLUCOSE UR STRIP-MCNC: NEGATIVE MG/DL
HCO3: 33 MMOL/L (ref 22–26)
HCT VFR BLD AUTO: 52.2 % (ref 34–48)
HGB BLD-MCNC: 16.9 G/DL (ref 11.5–15.5)
HGB UR QL STRIP.AUTO: NEGATIVE
HHB: 4.7 % (ref 0–5)
IMM GRANULOCYTES # BLD AUTO: 0.03 K/UL (ref 0–0.58)
IMM GRANULOCYTES NFR BLD: 0 % (ref 0–5)
INFLUENZA A BY PCR: NOT DETECTED
INFLUENZA B BY PCR: NOT DETECTED
KETONES UR STRIP-MCNC: 15 MG/DL
LAB: ABNORMAL
LACTATE BLDV-SCNC: 1.3 MMOL/L (ref 0.5–1.9)
LEUKOCYTE ESTERASE UR QL STRIP: NEGATIVE
LYMPHOCYTES NFR BLD: 1.4 K/UL (ref 1.5–4)
LYMPHOCYTES RELATIVE PERCENT: 17 % (ref 20–42)
Lab: 1415
MAGNESIUM SERPL-MCNC: 1.9 MG/DL (ref 1.6–2.6)
MCH RBC QN AUTO: 32.5 PG (ref 26–35)
MCHC RBC AUTO-ENTMCNC: 32.4 G/DL (ref 32–34.5)
MCV RBC AUTO: 100.4 FL (ref 80–99.9)
METHB: 0 % (ref 0–1.5)
MODE: ABNORMAL
MONOCYTES NFR BLD: 0.53 K/UL (ref 0.1–0.95)
MONOCYTES NFR BLD: 6 % (ref 2–12)
NEUTROPHILS NFR BLD: 74 % (ref 43–80)
NEUTS SEG NFR BLD: 6.11 K/UL (ref 1.8–7.3)
NITRITE UR QL STRIP: NEGATIVE
O2 CONTENT: 22.6 ML/DL
O2 SATURATION: 95.2 % (ref 92–98.5)
O2HB: 94.2 % (ref 94–97)
OPERATOR ID: 6032
PATIENT TEMP: 37 C
PCO2: 56.1 MMHG (ref 35–45)
PH BLOOD GAS: 7.39 (ref 7.35–7.45)
PH UR STRIP: 6 [PH] (ref 5–9)
PLATELET # BLD AUTO: 256 K/UL (ref 130–450)
PMV BLD AUTO: 10.2 FL (ref 7–12)
PO2: 73.8 MMHG (ref 75–100)
POTASSIUM SERPL-SCNC: 3.9 MMOL/L (ref 3.5–5)
PROCALCITONIN SERPL-MCNC: 0.03 NG/ML (ref 0–0.08)
PROT SERPL-MCNC: 7.5 G/DL (ref 6.4–8.3)
PROT UR STRIP-MCNC: >=300 MG/DL
RBC # BLD AUTO: 5.2 M/UL (ref 3.5–5.5)
RBC #/AREA URNS HPF: ABNORMAL /HPF
SARS-COV-2 RDRP RESP QL NAA+PROBE: NOT DETECTED
SODIUM SERPL-SCNC: 142 MMOL/L (ref 132–146)
SOURCE, BLOOD GAS: ABNORMAL
SP GR UR STRIP: >1.03 (ref 1–1.03)
SPECIMEN DESCRIPTION: NORMAL
THB: 17.1 G/DL (ref 11.5–16.5)
TIME ANALYZED: 1424
TROPONIN I SERPL HS-MCNC: 15 NG/L (ref 0–9)
TROPONIN I SERPL HS-MCNC: 20 NG/L (ref 0–9)
UROBILINOGEN UR STRIP-ACNC: 1 EU/DL (ref 0–1)
WBC #/AREA URNS HPF: ABNORMAL /HPF
WBC OTHER # BLD: 8.3 K/UL (ref 4.5–11.5)

## 2024-11-11 PROCEDURE — 94640 AIRWAY INHALATION TREATMENT: CPT

## 2024-11-11 PROCEDURE — 80053 COMPREHEN METABOLIC PANEL: CPT

## 2024-11-11 PROCEDURE — 6360000002 HC RX W HCPCS: Performed by: EMERGENCY MEDICINE

## 2024-11-11 PROCEDURE — 96365 THER/PROPH/DIAG IV INF INIT: CPT

## 2024-11-11 PROCEDURE — 87502 INFLUENZA DNA AMP PROBE: CPT

## 2024-11-11 PROCEDURE — 93010 ELECTROCARDIOGRAM REPORT: CPT | Performed by: INTERNAL MEDICINE

## 2024-11-11 PROCEDURE — 87040 BLOOD CULTURE FOR BACTERIA: CPT

## 2024-11-11 PROCEDURE — 84145 PROCALCITONIN (PCT): CPT

## 2024-11-11 PROCEDURE — 2500000003 HC RX 250 WO HCPCS: Performed by: EMERGENCY MEDICINE

## 2024-11-11 PROCEDURE — 99214 OFFICE O/P EST MOD 30 MIN: CPT | Performed by: STUDENT IN AN ORGANIZED HEALTH CARE EDUCATION/TRAINING PROGRAM

## 2024-11-11 PROCEDURE — 6370000000 HC RX 637 (ALT 250 FOR IP): Performed by: EMERGENCY MEDICINE

## 2024-11-11 PROCEDURE — 85025 COMPLETE CBC W/AUTO DIFF WBC: CPT

## 2024-11-11 PROCEDURE — 3079F DIAST BP 80-89 MM HG: CPT | Performed by: STUDENT IN AN ORGANIZED HEALTH CARE EDUCATION/TRAINING PROGRAM

## 2024-11-11 PROCEDURE — 6360000002 HC RX W HCPCS: Performed by: INTERNAL MEDICINE

## 2024-11-11 PROCEDURE — 87070 CULTURE OTHR SPECIMN AEROBIC: CPT

## 2024-11-11 PROCEDURE — 87205 SMEAR GRAM STAIN: CPT

## 2024-11-11 PROCEDURE — 83880 ASSAY OF NATRIURETIC PEPTIDE: CPT

## 2024-11-11 PROCEDURE — 3075F SYST BP GE 130 - 139MM HG: CPT | Performed by: STUDENT IN AN ORGANIZED HEALTH CARE EDUCATION/TRAINING PROGRAM

## 2024-11-11 PROCEDURE — 2580000003 HC RX 258: Performed by: INTERNAL MEDICINE

## 2024-11-11 PROCEDURE — 93005 ELECTROCARDIOGRAM TRACING: CPT | Performed by: EMERGENCY MEDICINE

## 2024-11-11 PROCEDURE — 6370000000 HC RX 637 (ALT 250 FOR IP): Performed by: INTERNAL MEDICINE

## 2024-11-11 PROCEDURE — 82805 BLOOD GASES W/O2 SATURATION: CPT

## 2024-11-11 PROCEDURE — 2580000003 HC RX 258: Performed by: EMERGENCY MEDICINE

## 2024-11-11 PROCEDURE — 2060000000 HC ICU INTERMEDIATE R&B

## 2024-11-11 PROCEDURE — 83605 ASSAY OF LACTIC ACID: CPT

## 2024-11-11 PROCEDURE — 99223 1ST HOSP IP/OBS HIGH 75: CPT | Performed by: INTERNAL MEDICINE

## 2024-11-11 PROCEDURE — 99285 EMERGENCY DEPT VISIT HI MDM: CPT

## 2024-11-11 PROCEDURE — 87635 SARS-COV-2 COVID-19 AMP PRB: CPT

## 2024-11-11 PROCEDURE — 81001 URINALYSIS AUTO W/SCOPE: CPT

## 2024-11-11 PROCEDURE — 84484 ASSAY OF TROPONIN QUANT: CPT

## 2024-11-11 PROCEDURE — 71046 X-RAY EXAM CHEST 2 VIEWS: CPT

## 2024-11-11 PROCEDURE — 96375 TX/PRO/DX INJ NEW DRUG ADDON: CPT

## 2024-11-11 PROCEDURE — 83735 ASSAY OF MAGNESIUM: CPT

## 2024-11-11 RX ORDER — BUDESONIDE 0.5 MG/2ML
1 INHALANT ORAL
Status: DISCONTINUED | OUTPATIENT
Start: 2024-11-11 | End: 2024-11-14 | Stop reason: HOSPADM

## 2024-11-11 RX ORDER — PANTOPRAZOLE SODIUM 40 MG/1
40 TABLET, DELAYED RELEASE ORAL
Status: DISCONTINUED | OUTPATIENT
Start: 2024-11-12 | End: 2024-11-14 | Stop reason: HOSPADM

## 2024-11-11 RX ORDER — M-VIT,TX,IRON,MINS/CALC/FOLIC 27MG-0.4MG
1 TABLET ORAL DAILY
Status: DISCONTINUED | OUTPATIENT
Start: 2024-11-11 | End: 2024-11-14 | Stop reason: HOSPADM

## 2024-11-11 RX ORDER — ACETAMINOPHEN 325 MG/1
650 TABLET ORAL EVERY 6 HOURS PRN
Status: DISCONTINUED | OUTPATIENT
Start: 2024-11-11 | End: 2024-11-14 | Stop reason: HOSPADM

## 2024-11-11 RX ORDER — IPRATROPIUM BROMIDE AND ALBUTEROL SULFATE 2.5; .5 MG/3ML; MG/3ML
1 SOLUTION RESPIRATORY (INHALATION)
Status: COMPLETED | OUTPATIENT
Start: 2024-11-11 | End: 2024-11-11

## 2024-11-11 RX ORDER — ALBUTEROL SULFATE 0.83 MG/ML
2.5 SOLUTION RESPIRATORY (INHALATION) EVERY 4 HOURS PRN
Status: DISCONTINUED | OUTPATIENT
Start: 2024-11-11 | End: 2024-11-13

## 2024-11-11 RX ORDER — DEXTROAMPHETAMINE SACCHARATE, AMPHETAMINE ASPARTATE MONOHYDRATE, DEXTROAMPHETAMINE SULFATE AND AMPHETAMINE SULFATE 7.5; 7.5; 7.5; 7.5 MG/1; MG/1; MG/1; MG/1
30 CAPSULE, EXTENDED RELEASE ORAL DAILY
Status: DISCONTINUED | OUTPATIENT
Start: 2024-11-11 | End: 2024-11-14 | Stop reason: HOSPADM

## 2024-11-11 RX ORDER — IPRATROPIUM BROMIDE AND ALBUTEROL SULFATE 2.5; .5 MG/3ML; MG/3ML
1 SOLUTION RESPIRATORY (INHALATION)
Status: ACTIVE | OUTPATIENT
Start: 2024-11-11 | End: 2024-11-11

## 2024-11-11 RX ORDER — DEXAMETHASONE SODIUM PHOSPHATE 10 MG/ML
10 INJECTION INTRAMUSCULAR; INTRAVENOUS ONCE
Status: COMPLETED | OUTPATIENT
Start: 2024-11-11 | End: 2024-11-11

## 2024-11-11 RX ORDER — ROSUVASTATIN CALCIUM 20 MG/1
20 TABLET, COATED ORAL DAILY
Status: DISCONTINUED | OUTPATIENT
Start: 2024-11-11 | End: 2024-11-14 | Stop reason: HOSPADM

## 2024-11-11 RX ORDER — NICOTINE 21 MG/24HR
1 PATCH, TRANSDERMAL 24 HOURS TRANSDERMAL EVERY 24 HOURS
Status: DISCONTINUED | OUTPATIENT
Start: 2024-11-11 | End: 2024-11-14 | Stop reason: HOSPADM

## 2024-11-11 RX ORDER — SODIUM CHLORIDE 0.9 % (FLUSH) 0.9 %
5-40 SYRINGE (ML) INJECTION EVERY 12 HOURS SCHEDULED
Status: DISCONTINUED | OUTPATIENT
Start: 2024-11-11 | End: 2024-11-14 | Stop reason: HOSPADM

## 2024-11-11 RX ORDER — POLYETHYLENE GLYCOL 3350 17 G/17G
17 POWDER, FOR SOLUTION ORAL DAILY PRN
Status: DISCONTINUED | OUTPATIENT
Start: 2024-11-11 | End: 2024-11-14 | Stop reason: HOSPADM

## 2024-11-11 RX ORDER — METHYLPREDNISOLONE SODIUM SUCCINATE 40 MG/ML
40 INJECTION, POWDER, LYOPHILIZED, FOR SOLUTION INTRAMUSCULAR; INTRAVENOUS EVERY 8 HOURS
Status: DISCONTINUED | OUTPATIENT
Start: 2024-11-11 | End: 2024-11-13

## 2024-11-11 RX ORDER — METOPROLOL SUCCINATE 25 MG/1
25 TABLET, EXTENDED RELEASE ORAL DAILY
Status: DISCONTINUED | OUTPATIENT
Start: 2024-11-11 | End: 2024-11-14 | Stop reason: HOSPADM

## 2024-11-11 RX ORDER — ALBUTEROL SULFATE 90 UG/1
2 INHALANT RESPIRATORY (INHALATION)
Status: DISCONTINUED | OUTPATIENT
Start: 2024-11-11 | End: 2024-11-11 | Stop reason: CLARIF

## 2024-11-11 RX ORDER — ACETAMINOPHEN 650 MG/1
650 SUPPOSITORY RECTAL EVERY 6 HOURS PRN
Status: DISCONTINUED | OUTPATIENT
Start: 2024-11-11 | End: 2024-11-14 | Stop reason: HOSPADM

## 2024-11-11 RX ORDER — ALBUTEROL SULFATE 0.83 MG/ML
2.5 SOLUTION RESPIRATORY (INHALATION)
Status: DISCONTINUED | OUTPATIENT
Start: 2024-11-11 | End: 2024-11-12

## 2024-11-11 RX ORDER — ONDANSETRON 2 MG/ML
4 INJECTION INTRAMUSCULAR; INTRAVENOUS EVERY 6 HOURS PRN
Status: DISCONTINUED | OUTPATIENT
Start: 2024-11-11 | End: 2024-11-14 | Stop reason: HOSPADM

## 2024-11-11 RX ORDER — ARFORMOTEROL TARTRATE 15 UG/2ML
15 SOLUTION RESPIRATORY (INHALATION)
Status: DISCONTINUED | OUTPATIENT
Start: 2024-11-11 | End: 2024-11-14 | Stop reason: HOSPADM

## 2024-11-11 RX ORDER — ENOXAPARIN SODIUM 100 MG/ML
30 INJECTION SUBCUTANEOUS 2 TIMES DAILY
Status: DISCONTINUED | OUTPATIENT
Start: 2024-11-11 | End: 2024-11-14 | Stop reason: HOSPADM

## 2024-11-11 RX ORDER — GUAIFENESIN 400 MG/1
400 TABLET ORAL 3 TIMES DAILY
Status: DISCONTINUED | OUTPATIENT
Start: 2024-11-11 | End: 2024-11-14 | Stop reason: HOSPADM

## 2024-11-11 RX ORDER — FUROSEMIDE 20 MG/1
20 TABLET ORAL DAILY
Status: DISCONTINUED | OUTPATIENT
Start: 2024-11-11 | End: 2024-11-14 | Stop reason: HOSPADM

## 2024-11-11 RX ORDER — DULOXETIN HYDROCHLORIDE 60 MG/1
60 CAPSULE, DELAYED RELEASE ORAL 2 TIMES DAILY
Status: DISCONTINUED | OUTPATIENT
Start: 2024-11-11 | End: 2024-11-14 | Stop reason: HOSPADM

## 2024-11-11 RX ORDER — ONDANSETRON 4 MG/1
4 TABLET, ORALLY DISINTEGRATING ORAL EVERY 8 HOURS PRN
Status: DISCONTINUED | OUTPATIENT
Start: 2024-11-11 | End: 2024-11-14 | Stop reason: HOSPADM

## 2024-11-11 RX ORDER — FAMOTIDINE 20 MG/1
20 TABLET, FILM COATED ORAL 2 TIMES DAILY
Status: DISCONTINUED | OUTPATIENT
Start: 2024-11-11 | End: 2024-11-11

## 2024-11-11 RX ORDER — FUROSEMIDE 10 MG/ML
40 INJECTION INTRAMUSCULAR; INTRAVENOUS ONCE
Status: COMPLETED | OUTPATIENT
Start: 2024-11-11 | End: 2024-11-11

## 2024-11-11 RX ORDER — SODIUM CHLORIDE 0.9 % (FLUSH) 0.9 %
5-40 SYRINGE (ML) INJECTION PRN
Status: DISCONTINUED | OUTPATIENT
Start: 2024-11-11 | End: 2024-11-14 | Stop reason: HOSPADM

## 2024-11-11 RX ORDER — PREGABALIN 75 MG/1
75 CAPSULE ORAL 3 TIMES DAILY
Status: DISCONTINUED | OUTPATIENT
Start: 2024-11-11 | End: 2024-11-14 | Stop reason: HOSPADM

## 2024-11-11 RX ORDER — ALLOPURINOL 300 MG/1
300 TABLET ORAL DAILY
Status: DISCONTINUED | OUTPATIENT
Start: 2024-11-11 | End: 2024-11-14 | Stop reason: HOSPADM

## 2024-11-11 RX ORDER — LORATADINE 10 MG
400 TABLET ORAL 3 TIMES DAILY
COMMUNITY
Start: 2024-10-10

## 2024-11-11 RX ORDER — SODIUM CHLORIDE 9 MG/ML
INJECTION, SOLUTION INTRAVENOUS PRN
Status: DISCONTINUED | OUTPATIENT
Start: 2024-11-11 | End: 2024-11-14 | Stop reason: HOSPADM

## 2024-11-11 RX ORDER — DOXYCYCLINE 100 MG/1
100 CAPSULE ORAL EVERY 12 HOURS SCHEDULED
Status: DISCONTINUED | OUTPATIENT
Start: 2024-11-11 | End: 2024-11-11

## 2024-11-11 RX ORDER — MAGNESIUM SULFATE IN WATER 40 MG/ML
2000 INJECTION, SOLUTION INTRAVENOUS ONCE
Status: COMPLETED | OUTPATIENT
Start: 2024-11-11 | End: 2024-11-11

## 2024-11-11 RX ADMIN — FUROSEMIDE 40 MG: 10 INJECTION, SOLUTION INTRAMUSCULAR; INTRAVENOUS at 20:35

## 2024-11-11 RX ADMIN — DOXYCYCLINE HYCLATE 100 MG: 100 CAPSULE ORAL at 20:32

## 2024-11-11 RX ADMIN — IPRATROPIUM BROMIDE AND ALBUTEROL SULFATE 1 DOSE: 2.5; .5 SOLUTION RESPIRATORY (INHALATION) at 13:49

## 2024-11-11 RX ADMIN — SODIUM CHLORIDE, PRESERVATIVE FREE 10 ML: 5 INJECTION INTRAVENOUS at 20:36

## 2024-11-11 RX ADMIN — DULOXETINE HYDROCHLORIDE 60 MG: 60 CAPSULE, DELAYED RELEASE ORAL at 20:32

## 2024-11-11 RX ADMIN — ARFORMOTEROL TARTRATE 15 MCG: 15 SOLUTION RESPIRATORY (INHALATION) at 20:05

## 2024-11-11 RX ADMIN — DOXYCYCLINE 100 MG: 100 INJECTION, POWDER, LYOPHILIZED, FOR SOLUTION INTRAVENOUS at 14:21

## 2024-11-11 RX ADMIN — GUAIFENESIN 400 MG: 400 TABLET ORAL at 20:32

## 2024-11-11 RX ADMIN — ALBUTEROL SULFATE 2.5 MG: 2.5 SOLUTION RESPIRATORY (INHALATION) at 20:05

## 2024-11-11 RX ADMIN — WATER 2000 MG: 1 INJECTION INTRAMUSCULAR; INTRAVENOUS; SUBCUTANEOUS at 14:16

## 2024-11-11 RX ADMIN — METHYLPREDNISOLONE SODIUM SUCCINATE 40 MG: 40 INJECTION INTRAMUSCULAR; INTRAVENOUS at 21:02

## 2024-11-11 RX ADMIN — MAGNESIUM SULFATE HEPTAHYDRATE 2000 MG: 40 INJECTION, SOLUTION INTRAVENOUS at 14:14

## 2024-11-11 RX ADMIN — DEXAMETHASONE SODIUM PHOSPHATE 10 MG: 10 INJECTION INTRAMUSCULAR; INTRAVENOUS at 14:22

## 2024-11-11 RX ADMIN — PREGABALIN 75 MG: 75 CAPSULE ORAL at 20:32

## 2024-11-11 RX ADMIN — ENOXAPARIN SODIUM 30 MG: 100 INJECTION SUBCUTANEOUS at 20:34

## 2024-11-11 RX ADMIN — IPRATROPIUM BROMIDE AND ALBUTEROL SULFATE 1 DOSE: 2.5; .5 SOLUTION RESPIRATORY (INHALATION) at 13:50

## 2024-11-11 RX ADMIN — ALLOPURINOL 300 MG: 300 TABLET ORAL at 20:33

## 2024-11-11 RX ADMIN — Medication 1 TABLET: at 20:32

## 2024-11-11 RX ADMIN — METOPROLOL SUCCINATE 25 MG: 25 TABLET, EXTENDED RELEASE ORAL at 20:33

## 2024-11-11 RX ADMIN — ROSUVASTATIN CALCIUM 20 MG: 20 TABLET, FILM COATED ORAL at 20:33

## 2024-11-11 RX ADMIN — IPRATROPIUM BROMIDE AND ALBUTEROL SULFATE 1 DOSE: 2.5; .5 SOLUTION RESPIRATORY (INHALATION) at 13:54

## 2024-11-11 RX ADMIN — BUDESONIDE 1000 MCG: 0.5 SUSPENSION RESPIRATORY (INHALATION) at 20:05

## 2024-11-11 RX ADMIN — IPRATROPIUM BROMIDE 0.5 MG: 0.5 SOLUTION RESPIRATORY (INHALATION) at 20:05

## 2024-11-11 ASSESSMENT — ENCOUNTER SYMPTOMS
SHORTNESS OF BREATH: 1
COUGH: 1
COUGH: 1
WHEEZING: 1
VOMITING: 0
ABDOMINAL PAIN: 0
SHORTNESS OF BREATH: 1
RHINORRHEA: 0
NAUSEA: 0

## 2024-11-11 ASSESSMENT — PAIN - FUNCTIONAL ASSESSMENT: PAIN_FUNCTIONAL_ASSESSMENT: NONE - DENIES PAIN

## 2024-11-11 NOTE — ASSESSMENT & PLAN NOTE
Given increased oxygen requirements I advised to go to ER immediately. Differential includes COPD exacerbation, PE, heart failure, MI or other life threatening etiologies. She was in agreement with the plan. Given she has portable oxygen and a  felt she was appropriate to go by private vehicle.

## 2024-11-11 NOTE — ED NOTES
ED to Inpatient Handoff Report    Notified colby that electronic handoff available and patient ready for transport to room 601.    Safety Risks: Difficulty with daily activities    Patient in Restraints: no    Constant Observer or Patient : no    Telemetry Monitoring Ordered :Yes           Order to transfer to unit without monitor:NO    Last MEWS: 2 Time completed: 1801    Deterioration Index Score:   Predictive Model Details          28 (Normal)  Factor Value    Calculated 11/11/2024 18:05 40% Supplemental oxygen Nasal cannula    Deterioration Index Model 36% Age 58 years old     8% Respiratory rate 18     8% Pulse 101     5% Sodium 142 mmol/L     1% Hematocrit abnormal (52.2 %)     1% Systolic 108     1% WBC count 8.3 k/uL     0% Pulse oximetry 98 %     0% Blood pH 7.388     0% Temperature 98.1 °F (36.7 °C)     0% Potassium 3.9 mmol/L        Vitals:    11/11/24 1149 11/11/24 1700 11/11/24 1801   BP: 129/84 116/63 108/75   Pulse: 86 76 (!) 101   Resp: 16 25 18   Temp: 98.4 °F (36.9 °C)  98.1 °F (36.7 °C)   TempSrc: Oral  Oral   SpO2: 95% 97% 98%   Weight: 108.9 kg (240 lb)     Height: 1.626 m (5' 4\")           Opportunity for questions and clarification was provided.

## 2024-11-11 NOTE — ED TRIAGE NOTES
Department of Emergency Medicine    FIRST PROVIDER TRIAGE NOTE             Independent MLP           11/11/24  11:50 AM EST    Date of Encounter: 11/11/24   MRN: [id number]    Vitals:    11/11/24 1149   BP: 129/84   Pulse: 86   Resp: 16   Temp: 98.4 °F (36.9 °C)   TempSrc: Oral   SpO2: 95%        HPI: Tamiko Ch is a 58 y.o. female who presents to the ED for Shortness of Breath (Pt was seen at pcp and sent here, SOB since Friday. Pt not always on 02 been on 6L at home)     Increased oxygen demand   Also reports cough x 2-3 days     ROS: Negative for back pain, vomiting, or diarrhea.    Physical Exam:   Gen Appearance/Constitutional: alert  CV: regular rate  CVS: wheezing, 95% on 6L     Initial Plan of Care: All treatment areas with department are currently occupied.     Plan to order/Initiate the following while awaiting opening in ED: Triage evaluation  EKG and imaging studies.    Provider-Patient relationship only established for Provider In Triage (PIT).  Full assessment, HPI, and examination not performed, therefore, it is not yet possible to state whether or not an emergency medical condition exists.  This provider not responsible to follow or interpret any labs or testing ordered in triage.  Supervisor request for MARY to initiate contact and input an assessment note in triage during high volume surges.     Initial Plan of Care: Initiate Treatment-Testing, Proceed toTreatment Area When Bed Available for ED Attending/MLP to Continue Care  Secondary to high volume, low staffing, and/or boarding- patient to await bed availability.    This ends my PIT-Patient relationship.  Care of patient relinquished after triage    Electronically signed by Nolvia Cardoso PA-C   DD: 11/11/24

## 2024-11-11 NOTE — PROGRESS NOTES
Tamiko Ch (:  1965) is a 58 y.o. female,Established patient, here for evaluation of the following chief complaint(s):  Dizziness (X1 week ) and Cough       Assessment & Plan  Acute exacerbation of chronic obstructive pulmonary disease (COPD) (HCC)     Given increased oxygen requirements I advised to go to ER immediately. Differential includes COPD exacerbation, PE, heart failure, MI or other life threatening etiologies. She was in agreement with the plan. Given she has portable oxygen and a  felt she was appropriate to go by private vehicle.            No follow-ups on file. Go to ER       Subjective   Dizziness and cough x1 week. Worsening the last 3 days  Hx of COPD at baseline  States over the weekend she was asleep or passed out for about 36 hours straight  She has had to increase oxygen use, usually only uses it at baseline and is now using 5L continuous  No fever  Feeling much more fatigued than usual        Review of Systems   Constitutional:  Positive for fatigue. Negative for chills and fever.   HENT:  Negative for congestion and rhinorrhea.    Respiratory:  Positive for cough, shortness of breath and wheezing.    Cardiovascular:  Negative for chest pain and leg swelling.   Gastrointestinal:  Negative for abdominal pain, nausea and vomiting.   Genitourinary:  Negative for dysuria and hematuria.   Musculoskeletal:  Negative for arthralgias and myalgias.   Skin:  Negative for rash and wound.   Neurological:  Negative for dizziness and light-headedness.          Objective   Physical Exam  Vitals reviewed.   Constitutional:       General: She is not in acute distress.  HENT:      Head: Normocephalic and atraumatic.   Eyes:      Extraocular Movements: Extraocular movements intact.      Conjunctiva/sclera: Conjunctivae normal.   Cardiovascular:      Rate and Rhythm: Normal rate and regular rhythm.   Pulmonary:      Effort: Pulmonary effort is normal.      Breath sounds: No wheezing.

## 2024-11-11 NOTE — ED PROVIDER NOTES
COPD (chronic obstructive pulmonary disease) ([facility]) 06/27/2019    ADHD (attention deficit hyperactivity disorder) 06/27/2019    Hyperlipidemia 06/27/2019    Depression 06/27/2019    Gout 06/27/2019    Headache     Migraine without aura and without status migrainosus, not intractable 10/19/2022    Body mass index (BMI) 45.0-49.9, adult 04/25/2023    Tobacco use 07/20/2023    Acute respiratory failure 09/19/2023    Acute respiratory failure with hypoxia 09/19/2023    Acute on chronic respiratory failure with hypercapnia 01/03/2024    Acute exacerbation of chronic obstructive pulmonary disease (COPD) (Former[facility]) 01/03/2024    Atelectasis 01/05/2024    Primary hypertension 01/05/2024    COPD exacerbation ([facility]) 01/05/2024    Current moderate episode of major depressive disorder without prior episode ([facility]) 01/11/2024    Prediabetes 01/11/2024    Restless legs syndrome 07/09/2024    Peripheral neuropathy 07/09/2024     Resolved Ambulatory Problems     Diagnosis Date Noted    No Resolved Ambulatory Problems     Past Medical History:   Diagnosis Date    GERD (gastroesophageal reflux disease)     Hypertension     Irritable bowel syndrome     Narcolepsy     Rhinovirus          CONSULTS: (Who and What was discussed)  IP CONSULT TO INTERNAL MEDICINE  IP CONSULT TO PULMONOLOGY    Discussion with Other Profesionals : Admitting Team dr olmos    Social Determinants : None    Records Reviewed : Outpatient Notes pulmonary    CC/HPI Summary, DDx, ED Course, and Reassessment:   Pt presents for increased sob and work of breathing. Normally only wears o2 at bedtime has been wearing it 24/7 recently. Differential diagnosis includes copd exacerbation, chf, pneumonia, pe, ptx,   Abg showed marked hypoxia.. bnp was elevated, based on exam she likely has copd exacerbation w likely underlying viral illness. Pt stable on supplemental o2, pt given 6 duonebs, decadron, magnesium, abx. Casd d/w hospitalist who will admit      Disposition Considerations

## 2024-11-11 NOTE — H&P
Marli Patel DO   potassium chloride (KLOR-CON M) 20 MEQ extended release tablet Take 1 tablet by mouth daily 8/12/24  Yes Marli Patel DO   metoprolol succinate (TOPROL XL) 25 MG extended release tablet Take 1 tablet by mouth daily 8/12/24  Yes Marli Patel DO   furosemide (LASIX) 20 MG tablet Take 1 tablet by mouth daily 8/12/24  Yes Marli Patel DO   famotidine (PEPCID) 20 MG tablet Take 1 tablet by mouth 2 times daily 8/12/24  Yes Marli Patel DO   DULoxetine (CYMBALTA) 60 MG extended release capsule Take 1 capsule by mouth 2 times daily 8/12/24  Yes Marli Patel DO   allopurinol (ZYLOPRIM) 300 MG tablet take 1 tablet by mouth once daily 8/12/24  Yes Marli Patel DO   nicotine (NICODERM CQ) 21 MG/24HR Place 1 patch onto the skin every 24 hours 7/9/24 7/9/25 Yes Marli Patel DO   Multiple Vitamins-Minerals (THERAPEUTIC MULTIVITAMIN-MINERALS) tablet Take 1 tablet by mouth daily   Yes Provider, MD Hermelinda   albuterol sulfate HFA (VENTOLIN HFA) 108 (90 Base) MCG/ACT inhaler Inhale 2 puffs into the lungs 4 times daily as needed for Wheezing 4/17/24  Yes Marli Patel DO   Handicap Placard MISC by Does not apply route Duration:  5 years 10/4/23   Marli Patel DO       Allergies:    Patient has no known allergies.    Social History:    reports that she has been smoking cigarettes. She started smoking about 43 years ago. She has a 42 pack-year smoking history. She has been exposed to tobacco smoke. She has never used smokeless tobacco. She reports current alcohol use. She reports that she does not use drugs.    Family History:   family history is not on file.       PHYSICAL EXAM:  Vitals:  /63   Pulse 76   Temp 98.4 °F (36.9 °C) (Oral)   Resp 25   Ht 1.626 m (5' 4\")   Wt 108.9 kg (240 lb)   SpO2 97%   BMI 41.20 kg/m²     General Appearance: alert and oriented to person, place and time and in no acute distress  Skin: warm and dry  Head:

## 2024-11-12 ENCOUNTER — APPOINTMENT (OUTPATIENT)
Dept: CT IMAGING | Age: 59
DRG: 190 | End: 2024-11-12
Payer: COMMERCIAL

## 2024-11-12 LAB
ALBUMIN SERPL-MCNC: 3.5 G/DL (ref 3.5–5.2)
ALP SERPL-CCNC: 153 U/L (ref 35–104)
ALT SERPL-CCNC: 18 U/L (ref 0–32)
ANION GAP SERPL CALCULATED.3IONS-SCNC: 11 MMOL/L (ref 7–16)
AST SERPL-CCNC: 16 U/L (ref 0–31)
BASOPHILS # BLD: 0 K/UL (ref 0–0.2)
BASOPHILS NFR BLD: 0 % (ref 0–2)
BILIRUB SERPL-MCNC: 0.3 MG/DL (ref 0–1.2)
BUN SERPL-MCNC: 16 MG/DL (ref 6–23)
CALCIUM SERPL-MCNC: 10 MG/DL (ref 8.6–10.2)
CHLORIDE SERPL-SCNC: 98 MMOL/L (ref 98–107)
CO2 SERPL-SCNC: 32 MMOL/L (ref 22–29)
CREAT SERPL-MCNC: 0.8 MG/DL (ref 0.5–1)
EOSINOPHIL # BLD: 0 K/UL (ref 0.05–0.5)
EOSINOPHILS RELATIVE PERCENT: 0 % (ref 0–6)
ERYTHROCYTE [DISTWIDTH] IN BLOOD BY AUTOMATED COUNT: 13.8 % (ref 11.5–15)
GFR, ESTIMATED: 86 ML/MIN/1.73M2
GLUCOSE SERPL-MCNC: 148 MG/DL (ref 74–99)
HCT VFR BLD AUTO: 49.9 % (ref 34–48)
HGB BLD-MCNC: 16.6 G/DL (ref 11.5–15.5)
IMM GRANULOCYTES # BLD AUTO: 0.03 K/UL (ref 0–0.58)
IMM GRANULOCYTES NFR BLD: 1 % (ref 0–5)
LYMPHOCYTES NFR BLD: 0.63 K/UL (ref 1.5–4)
LYMPHOCYTES RELATIVE PERCENT: 10 % (ref 20–42)
MCH RBC QN AUTO: 32.6 PG (ref 26–35)
MCHC RBC AUTO-ENTMCNC: 33.3 G/DL (ref 32–34.5)
MCV RBC AUTO: 98 FL (ref 80–99.9)
MICROORGANISM SPEC CULT: NORMAL
MICROORGANISM/AGENT SPEC: NORMAL
MONOCYTES NFR BLD: 0.07 K/UL (ref 0.1–0.95)
MONOCYTES NFR BLD: 1 % (ref 2–12)
NEUTROPHILS NFR BLD: 89 % (ref 43–80)
NEUTS SEG NFR BLD: 5.93 K/UL (ref 1.8–7.3)
PLATELET # BLD AUTO: 272 K/UL (ref 130–450)
PMV BLD AUTO: 10.1 FL (ref 7–12)
POTASSIUM SERPL-SCNC: 4.4 MMOL/L (ref 3.5–5)
PROT SERPL-MCNC: 7.2 G/DL (ref 6.4–8.3)
RBC # BLD AUTO: 5.09 M/UL (ref 3.5–5.5)
SODIUM SERPL-SCNC: 141 MMOL/L (ref 132–146)
SPECIMEN DESCRIPTION: NORMAL
WBC OTHER # BLD: 6.7 K/UL (ref 4.5–11.5)

## 2024-11-12 PROCEDURE — 94640 AIRWAY INHALATION TREATMENT: CPT

## 2024-11-12 PROCEDURE — 80053 COMPREHEN METABOLIC PANEL: CPT

## 2024-11-12 PROCEDURE — 71250 CT THORAX DX C-: CPT

## 2024-11-12 PROCEDURE — 2060000000 HC ICU INTERMEDIATE R&B

## 2024-11-12 PROCEDURE — 6360000002 HC RX W HCPCS: Performed by: INTERNAL MEDICINE

## 2024-11-12 PROCEDURE — 6370000000 HC RX 637 (ALT 250 FOR IP)

## 2024-11-12 PROCEDURE — 6370000000 HC RX 637 (ALT 250 FOR IP): Performed by: INTERNAL MEDICINE

## 2024-11-12 PROCEDURE — 6360000002 HC RX W HCPCS

## 2024-11-12 PROCEDURE — 2580000003 HC RX 258

## 2024-11-12 PROCEDURE — 2580000003 HC RX 258: Performed by: INTERNAL MEDICINE

## 2024-11-12 PROCEDURE — 99232 SBSQ HOSP IP/OBS MODERATE 35: CPT | Performed by: STUDENT IN AN ORGANIZED HEALTH CARE EDUCATION/TRAINING PROGRAM

## 2024-11-12 PROCEDURE — 85025 COMPLETE CBC W/AUTO DIFF WBC: CPT

## 2024-11-12 RX ORDER — WATER 10 ML/10ML
INJECTION INTRAMUSCULAR; INTRAVENOUS; SUBCUTANEOUS
Status: COMPLETED
Start: 2024-11-12 | End: 2024-11-12

## 2024-11-12 RX ORDER — ALBUTEROL SULFATE 0.83 MG/ML
2.5 SOLUTION RESPIRATORY (INHALATION)
Status: DISCONTINUED | OUTPATIENT
Start: 2024-11-12 | End: 2024-11-13

## 2024-11-12 RX ADMIN — WATER 10 ML: 1 INJECTION INTRAMUSCULAR; INTRAVENOUS; SUBCUTANEOUS at 13:59

## 2024-11-12 RX ADMIN — ENOXAPARIN SODIUM 30 MG: 100 INJECTION SUBCUTANEOUS at 20:56

## 2024-11-12 RX ADMIN — METHYLPREDNISOLONE SODIUM SUCCINATE 40 MG: 40 INJECTION INTRAMUSCULAR; INTRAVENOUS at 05:08

## 2024-11-12 RX ADMIN — GUAIFENESIN 400 MG: 400 TABLET ORAL at 21:00

## 2024-11-12 RX ADMIN — SODIUM CHLORIDE, PRESERVATIVE FREE 10 ML: 5 INJECTION INTRAVENOUS at 20:57

## 2024-11-12 RX ADMIN — ARFORMOTEROL TARTRATE 15 MCG: 15 SOLUTION RESPIRATORY (INHALATION) at 08:01

## 2024-11-12 RX ADMIN — ENOXAPARIN SODIUM 30 MG: 100 INJECTION SUBCUTANEOUS at 09:06

## 2024-11-12 RX ADMIN — ALBUTEROL SULFATE 2.5 MG: 2.5 SOLUTION RESPIRATORY (INHALATION) at 12:36

## 2024-11-12 RX ADMIN — BUDESONIDE 1000 MCG: 0.5 SUSPENSION RESPIRATORY (INHALATION) at 08:01

## 2024-11-12 RX ADMIN — BUDESONIDE 1000 MCG: 0.5 SUSPENSION RESPIRATORY (INHALATION) at 19:44

## 2024-11-12 RX ADMIN — METHYLPREDNISOLONE SODIUM SUCCINATE 40 MG: 40 INJECTION INTRAMUSCULAR; INTRAVENOUS at 21:01

## 2024-11-12 RX ADMIN — PREGABALIN 75 MG: 75 CAPSULE ORAL at 09:05

## 2024-11-12 RX ADMIN — ROSUVASTATIN CALCIUM 20 MG: 20 TABLET, FILM COATED ORAL at 09:06

## 2024-11-12 RX ADMIN — ALBUTEROL SULFATE 2.5 MG: 2.5 SOLUTION RESPIRATORY (INHALATION) at 15:44

## 2024-11-12 RX ADMIN — SODIUM CHLORIDE, PRESERVATIVE FREE 10 ML: 5 INJECTION INTRAVENOUS at 10:30

## 2024-11-12 RX ADMIN — FUROSEMIDE 20 MG: 20 TABLET ORAL at 09:05

## 2024-11-12 RX ADMIN — DULOXETINE HYDROCHLORIDE 60 MG: 60 CAPSULE, DELAYED RELEASE ORAL at 09:06

## 2024-11-12 RX ADMIN — ALLOPURINOL 300 MG: 300 TABLET ORAL at 09:06

## 2024-11-12 RX ADMIN — Medication 1 TABLET: at 09:05

## 2024-11-12 RX ADMIN — METOPROLOL SUCCINATE 25 MG: 25 TABLET, EXTENDED RELEASE ORAL at 09:06

## 2024-11-12 RX ADMIN — PREGABALIN 75 MG: 75 CAPSULE ORAL at 20:55

## 2024-11-12 RX ADMIN — IPRATROPIUM BROMIDE 0.5 MG: 0.5 SOLUTION RESPIRATORY (INHALATION) at 08:01

## 2024-11-12 RX ADMIN — GUAIFENESIN 400 MG: 400 TABLET ORAL at 09:06

## 2024-11-12 RX ADMIN — ONDANSETRON 4 MG: 4 TABLET, ORALLY DISINTEGRATING ORAL at 09:24

## 2024-11-12 RX ADMIN — METHYLPREDNISOLONE SODIUM SUCCINATE 40 MG: 40 INJECTION INTRAMUSCULAR; INTRAVENOUS at 13:59

## 2024-11-12 RX ADMIN — PANTOPRAZOLE SODIUM 40 MG: 40 TABLET, DELAYED RELEASE ORAL at 05:08

## 2024-11-12 RX ADMIN — GUAIFENESIN 400 MG: 400 TABLET ORAL at 15:31

## 2024-11-12 RX ADMIN — ALBUTEROL SULFATE 2.5 MG: 2.5 SOLUTION RESPIRATORY (INHALATION) at 08:01

## 2024-11-12 RX ADMIN — PREGABALIN 75 MG: 75 CAPSULE ORAL at 13:59

## 2024-11-12 RX ADMIN — ALBUTEROL SULFATE 2.5 MG: 2.5 SOLUTION RESPIRATORY (INHALATION) at 19:45

## 2024-11-12 RX ADMIN — DULOXETINE HYDROCHLORIDE 60 MG: 60 CAPSULE, DELAYED RELEASE ORAL at 20:55

## 2024-11-12 RX ADMIN — ARFORMOTEROL TARTRATE 15 MCG: 15 SOLUTION RESPIRATORY (INHALATION) at 19:44

## 2024-11-12 NOTE — ACP (ADVANCE CARE PLANNING)
Advance Care Planning   Healthcare Decision Maker:    Primary Decision Maker: julieta salas - [phone]    Click here to complete Healthcare Decision Makers including selection of the Healthcare Decision Maker Relationship (ie \"Primary\").  Today we documented Decision Maker(s) consistent with Legal Next of Kin hierarchy.

## 2024-11-12 NOTE — PLAN OF CARE
Problem: Discharge Planning  Goal: Discharge to home or other facility with appropriate resources  Outcome: Progressing  Flowsheets (Taken 11/12/2024 4120)  Discharge to home or other facility with appropriate resources:   Identify barriers to discharge with patient and caregiver   Refer to discharge planning if patient needs post-hospital services based on physician order or complex needs related to functional status, cognitive ability or social support system     Problem: ABCDS Injury Assessment  Goal: Absence of physical injury  Outcome: Progressing     Problem: Safety - Adult  Goal: Free from fall injury  Outcome: Progressing

## 2024-11-12 NOTE — CARE COORDINATION
Advance Care Planning   Healthcare Decision Maker:      Click here to complete Healthcare Decision Makers including selection of the Healthcare Decision Maker Relationship (ie \"Primary\").  Today we discussed Healthcare Decision Makers. The patient is considering options.

## 2024-11-12 NOTE — ACP (ADVANCE CARE PLANNING)
Introduced my self and provided explanation of CM role to patient. Admitted for acute respiratory failure with hypoxia. Currently on 2L O2 baseline is RA, patient states she uses O2 thru her cpap at night only. Home oxygen supplier is [employer]. She has a nebulizer. Nurse to assess ambulating pulse ox testing when appropriate. She voices she resides alone in a basement apt. with 5 steps down to enter apt.  She completes her adl's with independence. Patient is established with Dr. Patel and uses [facility] pharmacy in [place]. Patient anticipated to discharge home when medically when stable. Will continue to follow.  Laura NAILSN, RN  Case Management

## 2024-11-12 NOTE — PLAN OF CARE
Problem: Discharge Planning  Goal: Discharge to home or other facility with appropriate resources  Outcome: Progressing  Flowsheets (Taken 11/11/2024 1729 by Yany Meyer, RN)  Discharge to home or other facility with appropriate resources: Refer to discharge planning if patient needs post-hospital services based on physician order or complex needs related to functional status, cognitive ability or social support system     Problem: ABCDS Injury Assessment  Goal: Absence of physical injury  Outcome: Progressing     Problem: Safety - Adult  Goal: Free from fall injury  Outcome: Progressing

## 2024-11-12 NOTE — DISCHARGE INSTRUCTIONS
Chronic Obstructive Pulmonary Disease (COPD): Care Instructions  Overview     Chronic obstructive pulmonary disease (COPD) is a lung disease that makes it hard to breathe.  COPD is usually caused by smoking. It is also caused by air pollution. Other things that may lead to COPD include breathing chemical fumes, factory dust, soot, and secondhand smoke over a long period of time.  Chronic bronchitis and emphysema are two lung problems that are types of COPD. In chronic bronchitis, the airways that carry air to the lungs (bronchial tubes) get inflamed and make a lot of mucus. This can narrow or block the airways, making it hard for you to breathe. It can also make you cough. In emphysema, the air sacs in your lungs are damaged and lose their stretch. Less air gets in and out of your lungs, which makes you feel short of breath.  Treatment can help you feel better and prevent symptoms from getting worse quickly. Some treatments may also slow the disease and help you live longer.  Follow-up care is a key part of your treatment and safety. Be sure to make and go to all appointments, and call your doctor if you are having problems. It's also a good idea to know your test results and keep a list of the medicines you take.  How can you care for yourself at home?  Staying healthy    If you smoke, try to quit or cut back as much as you can. Not smoking is the most important step you can take to slow the disease and help you feel better. If you need help quitting, talk to your doctor about stop-smoking programs and medicines. These can increase your chances of quitting for good.     Try to avoid infections such as COVID-19, colds, and the flu. Wash your hands often. You may want to wear a mask when you go to public indoor places. Try to avoid sick people.     Stay up to date on vaccines. Get the pneumococcal and whooping cough (pertussis) vaccines. If you have had these vaccines before, ask your doctor if you need another

## 2024-11-13 LAB
ALBUMIN SERPL-MCNC: 3.5 G/DL (ref 3.5–5.2)
ALP SERPL-CCNC: 125 U/L (ref 35–104)
ALT SERPL-CCNC: 21 U/L (ref 0–32)
ANION GAP SERPL CALCULATED.3IONS-SCNC: 12 MMOL/L (ref 7–16)
AST SERPL-CCNC: 16 U/L (ref 0–31)
BASOPHILS # BLD: 0.01 K/UL (ref 0–0.2)
BASOPHILS NFR BLD: 0 % (ref 0–2)
BILIRUB SERPL-MCNC: 0.2 MG/DL (ref 0–1.2)
BUN SERPL-MCNC: 35 MG/DL (ref 6–23)
CALCIUM SERPL-MCNC: 9.7 MG/DL (ref 8.6–10.2)
CHLORIDE SERPL-SCNC: 97 MMOL/L (ref 98–107)
CO2 SERPL-SCNC: 30 MMOL/L (ref 22–29)
CREAT SERPL-MCNC: 1.4 MG/DL (ref 0.5–1)
EOSINOPHIL # BLD: 0 K/UL (ref 0.05–0.5)
EOSINOPHILS RELATIVE PERCENT: 0 % (ref 0–6)
ERYTHROCYTE [DISTWIDTH] IN BLOOD BY AUTOMATED COUNT: 13.8 % (ref 11.5–15)
GFR, ESTIMATED: 43 ML/MIN/1.73M2
GLUCOSE SERPL-MCNC: 147 MG/DL (ref 74–99)
HCT VFR BLD AUTO: 46.7 % (ref 34–48)
HGB BLD-MCNC: 15.4 G/DL (ref 11.5–15.5)
IMM GRANULOCYTES # BLD AUTO: 0.04 K/UL (ref 0–0.58)
IMM GRANULOCYTES NFR BLD: 0 % (ref 0–5)
LYMPHOCYTES NFR BLD: 0.89 K/UL (ref 1.5–4)
LYMPHOCYTES RELATIVE PERCENT: 9 % (ref 20–42)
MCH RBC QN AUTO: 32.6 PG (ref 26–35)
MCHC RBC AUTO-ENTMCNC: 33 G/DL (ref 32–34.5)
MCV RBC AUTO: 98.7 FL (ref 80–99.9)
MONOCYTES NFR BLD: 0.17 K/UL (ref 0.1–0.95)
MONOCYTES NFR BLD: 2 % (ref 2–12)
NEUTROPHILS NFR BLD: 89 % (ref 43–80)
NEUTS SEG NFR BLD: 8.98 K/UL (ref 1.8–7.3)
PLATELET # BLD AUTO: 277 K/UL (ref 130–450)
PMV BLD AUTO: 10.2 FL (ref 7–12)
POTASSIUM SERPL-SCNC: 4.4 MMOL/L (ref 3.5–5)
PROT SERPL-MCNC: 6.7 G/DL (ref 6.4–8.3)
RBC # BLD AUTO: 4.73 M/UL (ref 3.5–5.5)
SODIUM SERPL-SCNC: 139 MMOL/L (ref 132–146)
WBC OTHER # BLD: 10.1 K/UL (ref 4.5–11.5)

## 2024-11-13 PROCEDURE — 6360000002 HC RX W HCPCS: Performed by: INTERNAL MEDICINE

## 2024-11-13 PROCEDURE — 6370000000 HC RX 637 (ALT 250 FOR IP): Performed by: INTERNAL MEDICINE

## 2024-11-13 PROCEDURE — 6360000002 HC RX W HCPCS

## 2024-11-13 PROCEDURE — 99232 SBSQ HOSP IP/OBS MODERATE 35: CPT | Performed by: STUDENT IN AN ORGANIZED HEALTH CARE EDUCATION/TRAINING PROGRAM

## 2024-11-13 PROCEDURE — 80053 COMPREHEN METABOLIC PANEL: CPT

## 2024-11-13 PROCEDURE — 2580000003 HC RX 258: Performed by: INTERNAL MEDICINE

## 2024-11-13 PROCEDURE — 5A09357 ASSISTANCE WITH RESPIRATORY VENTILATION, LESS THAN 24 CONSECUTIVE HOURS, CONTINUOUS POSITIVE AIRWAY PRESSURE: ICD-10-PCS | Performed by: INTERNAL MEDICINE

## 2024-11-13 PROCEDURE — 94640 AIRWAY INHALATION TREATMENT: CPT

## 2024-11-13 PROCEDURE — 1200000000 HC SEMI PRIVATE

## 2024-11-13 PROCEDURE — 2700000000 HC OXYGEN THERAPY PER DAY

## 2024-11-13 PROCEDURE — 85025 COMPLETE CBC W/AUTO DIFF WBC: CPT

## 2024-11-13 RX ORDER — LEVALBUTEROL INHALATION SOLUTION 0.63 MG/3ML
0.63 SOLUTION RESPIRATORY (INHALATION) EVERY 8 HOURS PRN
Status: DISCONTINUED | OUTPATIENT
Start: 2024-11-13 | End: 2024-11-14 | Stop reason: HOSPADM

## 2024-11-13 RX ORDER — LEVALBUTEROL INHALATION SOLUTION 0.63 MG/3ML
0.63 SOLUTION RESPIRATORY (INHALATION)
Status: DISCONTINUED | OUTPATIENT
Start: 2024-11-13 | End: 2024-11-14 | Stop reason: HOSPADM

## 2024-11-13 RX ORDER — METHYLPREDNISOLONE SODIUM SUCCINATE 40 MG/ML
40 INJECTION, POWDER, LYOPHILIZED, FOR SOLUTION INTRAMUSCULAR; INTRAVENOUS EVERY 12 HOURS
Status: DISCONTINUED | OUTPATIENT
Start: 2024-11-13 | End: 2024-11-14

## 2024-11-13 RX ADMIN — LEVALBUTEROL 0.63 MG: 0.63 SOLUTION RESPIRATORY (INHALATION) at 20:46

## 2024-11-13 RX ADMIN — LEVALBUTEROL 0.63 MG: 0.63 SOLUTION RESPIRATORY (INHALATION) at 12:37

## 2024-11-13 RX ADMIN — PREGABALIN 75 MG: 75 CAPSULE ORAL at 09:58

## 2024-11-13 RX ADMIN — METOPROLOL SUCCINATE 25 MG: 25 TABLET, EXTENDED RELEASE ORAL at 09:57

## 2024-11-13 RX ADMIN — SODIUM CHLORIDE, PRESERVATIVE FREE 10 ML: 5 INJECTION INTRAVENOUS at 21:00

## 2024-11-13 RX ADMIN — BUDESONIDE 1000 MCG: 0.5 SUSPENSION RESPIRATORY (INHALATION) at 20:46

## 2024-11-13 RX ADMIN — GUAIFENESIN 400 MG: 400 TABLET ORAL at 14:59

## 2024-11-13 RX ADMIN — SODIUM CHLORIDE, PRESERVATIVE FREE 10 ML: 5 INJECTION INTRAVENOUS at 09:58

## 2024-11-13 RX ADMIN — PANTOPRAZOLE SODIUM 40 MG: 40 TABLET, DELAYED RELEASE ORAL at 06:09

## 2024-11-13 RX ADMIN — ALLOPURINOL 300 MG: 300 TABLET ORAL at 09:58

## 2024-11-13 RX ADMIN — DULOXETINE HYDROCHLORIDE 60 MG: 60 CAPSULE, DELAYED RELEASE ORAL at 09:57

## 2024-11-13 RX ADMIN — ROSUVASTATIN CALCIUM 20 MG: 20 TABLET, FILM COATED ORAL at 09:58

## 2024-11-13 RX ADMIN — ENOXAPARIN SODIUM 30 MG: 100 INJECTION SUBCUTANEOUS at 21:00

## 2024-11-13 RX ADMIN — ENOXAPARIN SODIUM 30 MG: 100 INJECTION SUBCUTANEOUS at 09:58

## 2024-11-13 RX ADMIN — Medication 1 TABLET: at 09:58

## 2024-11-13 RX ADMIN — GUAIFENESIN 400 MG: 400 TABLET ORAL at 21:00

## 2024-11-13 RX ADMIN — DEXTROAMPHETAMINE SACCHARATE, AMPHETAMINE ASPARTATE MONOHYDRATE, DEXTROAMPHETAMINE SULFATE AND AMPHETAMINE SULFATE 30 MG: 7.5; 7.5; 7.5; 7.5 CAPSULE, EXTENDED RELEASE ORAL at 09:58

## 2024-11-13 RX ADMIN — METHYLPREDNISOLONE SODIUM SUCCINATE 40 MG: 40 INJECTION INTRAMUSCULAR; INTRAVENOUS at 17:40

## 2024-11-13 RX ADMIN — ALBUTEROL SULFATE 2.5 MG: 2.5 SOLUTION RESPIRATORY (INHALATION) at 08:54

## 2024-11-13 RX ADMIN — ARFORMOTEROL TARTRATE 15 MCG: 15 SOLUTION RESPIRATORY (INHALATION) at 08:54

## 2024-11-13 RX ADMIN — LEVALBUTEROL 0.63 MG: 0.63 SOLUTION RESPIRATORY (INHALATION) at 17:26

## 2024-11-13 RX ADMIN — BUDESONIDE 1000 MCG: 0.5 SUSPENSION RESPIRATORY (INHALATION) at 08:54

## 2024-11-13 RX ADMIN — METHYLPREDNISOLONE SODIUM SUCCINATE 40 MG: 40 INJECTION INTRAMUSCULAR; INTRAVENOUS at 06:08

## 2024-11-13 RX ADMIN — PREGABALIN 75 MG: 75 CAPSULE ORAL at 21:00

## 2024-11-13 RX ADMIN — PREGABALIN 75 MG: 75 CAPSULE ORAL at 14:59

## 2024-11-13 RX ADMIN — ARFORMOTEROL TARTRATE 15 MCG: 15 SOLUTION RESPIRATORY (INHALATION) at 20:46

## 2024-11-13 RX ADMIN — DULOXETINE HYDROCHLORIDE 60 MG: 60 CAPSULE, DELAYED RELEASE ORAL at 21:00

## 2024-11-13 RX ADMIN — GUAIFENESIN 400 MG: 400 TABLET ORAL at 09:57

## 2024-11-13 NOTE — CARE COORDINATION
Pulmonology is following. Plan to wean IV solumedrol from q 8hr to q 12hr. Currently on 3L O2 baseline is RA, patient states she bleeds 6L O2 thru her cpap at night only. Patient has her own cpap at bedside. Home oxygen supplier is [employer]. She has a nebulizer. Nurse to assess ambulating pulse ox testing when appropriate. Patient anticipated to discharge home when medically when stable. Will continue to follow.  Laura NAILSN, RN  Case Management

## 2024-11-13 NOTE — PLAN OF CARE
Problem: Discharge Planning  Goal: Discharge to home or other facility with appropriate resources  11/12/2024 2237 by Heriberto Kim RN  Outcome: Progressing  11/12/2024 1755 by Ivana Galindo RN  Outcome: Progressing  Flowsheets (Taken 11/12/2024 0720)  Discharge to home or other facility with appropriate resources:   Identify barriers to discharge with patient and caregiver   Refer to discharge planning if patient needs post-hospital services based on physician order or complex needs related to functional status, cognitive ability or social support system     Problem: ABCDS Injury Assessment  Goal: Absence of physical injury  11/12/2024 2237 by Heriberto Kim RN  Outcome: Progressing  11/12/2024 1755 by Ivana Galindo RN  Outcome: Progressing     Problem: Safety - Adult  Goal: Free from fall injury  11/12/2024 2237 by Heriberto Kim RN  Outcome: Progressing  11/12/2024 1755 by Ivana Galindo RN  Outcome: Progressing

## 2024-11-14 VITALS
BODY MASS INDEX: 40.95 KG/M2 | TEMPERATURE: 99.8 F | DIASTOLIC BLOOD PRESSURE: 62 MMHG | HEIGHT: 64 IN | RESPIRATION RATE: 17 BRPM | HEART RATE: 84 BPM | WEIGHT: 239.86 LBS | SYSTOLIC BLOOD PRESSURE: 122 MMHG | OXYGEN SATURATION: 95 %

## 2024-11-14 LAB
ALBUMIN SERPL-MCNC: 3.5 G/DL (ref 3.5–5.2)
ALP SERPL-CCNC: 107 U/L (ref 35–104)
ALT SERPL-CCNC: 19 U/L (ref 0–32)
ANION GAP SERPL CALCULATED.3IONS-SCNC: 7 MMOL/L (ref 7–16)
AST SERPL-CCNC: 14 U/L (ref 0–31)
BASOPHILS # BLD: 0.01 K/UL (ref 0–0.2)
BASOPHILS NFR BLD: 0 % (ref 0–2)
BILIRUB SERPL-MCNC: 0.2 MG/DL (ref 0–1.2)
BUN SERPL-MCNC: 36 MG/DL (ref 6–23)
CALCIUM SERPL-MCNC: 9.2 MG/DL (ref 8.6–10.2)
CHLORIDE SERPL-SCNC: 98 MMOL/L (ref 98–107)
CO2 SERPL-SCNC: 33 MMOL/L (ref 22–29)
CREAT SERPL-MCNC: 1.1 MG/DL (ref 0.5–1)
EOSINOPHIL # BLD: 0 K/UL (ref 0.05–0.5)
EOSINOPHILS RELATIVE PERCENT: 0 % (ref 0–6)
ERYTHROCYTE [DISTWIDTH] IN BLOOD BY AUTOMATED COUNT: 13.7 % (ref 11.5–15)
GFR, ESTIMATED: 56 ML/MIN/1.73M2
GLUCOSE SERPL-MCNC: 177 MG/DL (ref 74–99)
HCT VFR BLD AUTO: 43.8 % (ref 34–48)
HGB BLD-MCNC: 14.3 G/DL (ref 11.5–15.5)
IMM GRANULOCYTES # BLD AUTO: 0.05 K/UL (ref 0–0.58)
IMM GRANULOCYTES NFR BLD: 1 % (ref 0–5)
LYMPHOCYTES NFR BLD: 1.12 K/UL (ref 1.5–4)
LYMPHOCYTES RELATIVE PERCENT: 13 % (ref 20–42)
MCH RBC QN AUTO: 32.5 PG (ref 26–35)
MCHC RBC AUTO-ENTMCNC: 32.6 G/DL (ref 32–34.5)
MCV RBC AUTO: 99.5 FL (ref 80–99.9)
MONOCYTES NFR BLD: 0.31 K/UL (ref 0.1–0.95)
MONOCYTES NFR BLD: 4 % (ref 2–12)
NEUTROPHILS NFR BLD: 83 % (ref 43–80)
NEUTS SEG NFR BLD: 7.13 K/UL (ref 1.8–7.3)
PLATELET # BLD AUTO: 251 K/UL (ref 130–450)
PMV BLD AUTO: 9.9 FL (ref 7–12)
POTASSIUM SERPL-SCNC: 4.2 MMOL/L (ref 3.5–5)
PROT SERPL-MCNC: 6.2 G/DL (ref 6.4–8.3)
RBC # BLD AUTO: 4.4 M/UL (ref 3.5–5.5)
SODIUM SERPL-SCNC: 138 MMOL/L (ref 132–146)
WBC OTHER # BLD: 8.6 K/UL (ref 4.5–11.5)

## 2024-11-14 PROCEDURE — 36415 COLL VENOUS BLD VENIPUNCTURE: CPT

## 2024-11-14 PROCEDURE — 6370000000 HC RX 637 (ALT 250 FOR IP): Performed by: INTERNAL MEDICINE

## 2024-11-14 PROCEDURE — 6370000000 HC RX 637 (ALT 250 FOR IP)

## 2024-11-14 PROCEDURE — 6360000002 HC RX W HCPCS: Performed by: INTERNAL MEDICINE

## 2024-11-14 PROCEDURE — 2700000000 HC OXYGEN THERAPY PER DAY

## 2024-11-14 PROCEDURE — 80053 COMPREHEN METABOLIC PANEL: CPT

## 2024-11-14 PROCEDURE — 94640 AIRWAY INHALATION TREATMENT: CPT

## 2024-11-14 PROCEDURE — 99239 HOSP IP/OBS DSCHRG MGMT >30: CPT | Performed by: STUDENT IN AN ORGANIZED HEALTH CARE EDUCATION/TRAINING PROGRAM

## 2024-11-14 PROCEDURE — 85025 COMPLETE CBC W/AUTO DIFF WBC: CPT

## 2024-11-14 PROCEDURE — 6360000002 HC RX W HCPCS

## 2024-11-14 RX ORDER — PREDNISONE 20 MG/1
40 TABLET ORAL DAILY
Status: DISCONTINUED | OUTPATIENT
Start: 2024-11-14 | End: 2024-11-14 | Stop reason: HOSPADM

## 2024-11-14 RX ORDER — PREDNISONE 10 MG/1
TABLET ORAL
Qty: 30 TABLET | Refills: 0 | Status: SHIPPED | OUTPATIENT
Start: 2024-11-15

## 2024-11-14 RX ORDER — LEVALBUTEROL INHALATION SOLUTION 0.63 MG/3ML
0.63 SOLUTION RESPIRATORY (INHALATION) EVERY 8 HOURS PRN
Qty: 120 EACH | Refills: 3 | Status: SHIPPED | OUTPATIENT
Start: 2024-11-14

## 2024-11-14 RX ADMIN — PANTOPRAZOLE SODIUM 40 MG: 40 TABLET, DELAYED RELEASE ORAL at 06:30

## 2024-11-14 RX ADMIN — DULOXETINE HYDROCHLORIDE 60 MG: 60 CAPSULE, DELAYED RELEASE ORAL at 09:37

## 2024-11-14 RX ADMIN — PREGABALIN 75 MG: 75 CAPSULE ORAL at 09:36

## 2024-11-14 RX ADMIN — PREDNISONE 40 MG: 20 TABLET ORAL at 09:36

## 2024-11-14 RX ADMIN — DEXTROAMPHETAMINE SACCHARATE, AMPHETAMINE ASPARTATE MONOHYDRATE, DEXTROAMPHETAMINE SULFATE AND AMPHETAMINE SULFATE 30 MG: 7.5; 7.5; 7.5; 7.5 CAPSULE, EXTENDED RELEASE ORAL at 09:42

## 2024-11-14 RX ADMIN — ARFORMOTEROL TARTRATE 15 MCG: 15 SOLUTION RESPIRATORY (INHALATION) at 08:38

## 2024-11-14 RX ADMIN — GUAIFENESIN 400 MG: 400 TABLET ORAL at 09:36

## 2024-11-14 RX ADMIN — Medication 1 TABLET: at 09:36

## 2024-11-14 RX ADMIN — ROSUVASTATIN CALCIUM 20 MG: 20 TABLET, FILM COATED ORAL at 09:37

## 2024-11-14 RX ADMIN — LEVALBUTEROL 0.63 MG: 0.63 SOLUTION RESPIRATORY (INHALATION) at 12:39

## 2024-11-14 RX ADMIN — ALLOPURINOL 300 MG: 300 TABLET ORAL at 09:36

## 2024-11-14 RX ADMIN — ENOXAPARIN SODIUM 30 MG: 100 INJECTION SUBCUTANEOUS at 09:37

## 2024-11-14 RX ADMIN — BUDESONIDE 1000 MCG: 0.5 SUSPENSION RESPIRATORY (INHALATION) at 08:38

## 2024-11-14 RX ADMIN — METOPROLOL SUCCINATE 25 MG: 25 TABLET, EXTENDED RELEASE ORAL at 09:40

## 2024-11-14 RX ADMIN — LEVALBUTEROL 0.63 MG: 0.63 SOLUTION RESPIRATORY (INHALATION) at 08:38

## 2024-11-14 NOTE — CARE COORDINATION
Transition of Care-discharge order noted, Pulmonary ordered nebulizer for home, call to [employer] to deliver. Liaison stated she can deliver this evening vs.morning. Patient updated. Discharge plan is home, no further needs.      Yun PIERRE, RN  [facility]

## 2024-11-14 NOTE — PLAN OF CARE
Problem: Discharge Planning  Goal: Discharge to home or other facility with appropriate resources  11/14/2024 1043 by Teofilo Lund RN  Outcome: Progressing  11/14/2024 0434 by Kristen Cristobal RN  Outcome: Progressing     Problem: ABCDS Injury Assessment  Goal: Absence of physical injury  11/14/2024 1043 by Teofilo Lund RN  Outcome: Progressing  11/14/2024 0434 by Kristen Cristobal RN  Outcome: Progressing     Problem: Safety - Adult  Goal: Free from fall injury  11/14/2024 1043 by Teofilo Lund RN  Outcome: Progressing  11/14/2024 0434 by Kristen Cristobal RN  Outcome: Progressing

## 2024-11-14 NOTE — PROGRESS NOTES
Darci Carmona M.D.,[facility]  Emery Alexander D.O., F.CISCO., [facility]  Leticia Shook M.D.  Adriana Adams M.D.   Lauro Dumas D.O.  Kodak Noriega M.D.         Daily Pulmonary Progress Note    Patient:  Tamiko Ch 58 y.o. female MRN: [id number]            Synopsis     We are following patient for COPD    \"CC\" Cough, shortness of breath     Code status: FULL CODE      Subjective      Patient was seen and examined sitting up in bed just returning from walking with RN. Patient feeling pretty good and feels ready to go home.    Review of Systems:  Constitutional: Denies fever, weight loss, night sweats, and fatigue  Skin: Denies pigmentation, dark lesions, and rashes   HEENT: Denies hearing loss, tinnitus, ear drainage, epistaxis, sore throat, and hoarseness.  Cardiovascular: Denies palpitations, chest pain, and chest pressure.  Respiratory: Shortness of breath, cough   Gastrointestinal: Denies nausea, vomiting, poor appetite, diarrhea, heartburn or reflux  Genitourinary: Denies dysuria, frequency, urgency or hematuria  Musculoskeletal: Denies myalgias, muscle weakness, and bone pain  Neurological: Denies dizziness, vertigo, headache, and focal weakness  Psychological: Denies anxiety and depression  Endocrine: Denies heat intolerance and cold intolerance  Hematopoietic/Lymphatic: Denies bleeding problems and blood transfusions    24-hour events:  No new events    Objective   OBJECTIVE:   /62   Pulse 84   Temp 99.8 °F (37.7 °C) (Oral)   Resp 17   Ht 1.626 m (5' 4\")   Wt 108.8 kg (239 lb 13.8 oz)   SpO2 95%   BMI 41.17 kg/m²   SpO2 Readings from Last 1 Encounters:   11/14/24 95%        I/O:    Intake/Output Summary (Last 24 hours) at 11/14/2024 1241  Last data filed at 11/13/2024 2100  Gross per 24 hour   Intake 10 ml   Output --   Net 10 ml                      CURRENT MEDS :  Scheduled Meds:   predniSONE  40 mg Oral Daily    levalbuterol  0.63 mg Nebulization Q4H WA RT    allopurinol  300 mg 
4 Eyes Skin Assessment     NAME:  Tamiko Ch  YOB: 1965  MEDICAL RECORD NUMBER:  [id number]    The patient is being assessed for  Transfer to New Unit    I agree that at least one RN has performed a thorough Head to Toe Skin Assessment on the patient. ALL assessment sites listed below have been assessed.      Areas assessed by both nurses:    Head, Face, Ears, Shoulders, Back, Chest, Arms, Elbows, Hands, Sacrum. Buttock, Coccyx, Ischium, Legs. Feet and Heels, and Under Medical Devices         Does the Patient have a Wound? No noted wound(s)       Saeid Prevention initiated by RN: No  Wound Care Orders initiated by RN: No    Pressure Injury (Stage 3,4, Unstageable, DTI, NWPT, and Complex wounds) if present, place Wound referral order by RN under : No    New Ostomies, if present place, Ostomy referral order under : No     Nurse 1 eSignature: Electronically signed by Teofilo Brooks RN on 11/13/24 at 6:37 PM EST    **SHARE this note so that the co-signing nurse can place an eSignature**    Nurse 2 eSignature: Electronically signed by ASHLEY BROOKS RN on 11/13/24 at 6:38 PM EST    
[facility] called as per Dr Shook's conversation. Spoke with Andrew. Andrew will fax original sleep study results to 6W. RT will pull compliance report and fax as well.   
CALL BACK FROM REMY GLYNN. WAIT FOR SOLUMEDROL WHEN IV ACCESS OBTAINED .  
Database initiated pharmacy and medications verified with the patient. She is A&O comes in from home alone. She uses no assistive devices and wears 5 liters oxygen at night only through [employer]. She has been in bed and not eaten or taken her medications since Friday.    
F/U call mad to Cedric Sainz ([phone]) in regards to sleep study and compliance report. Repeat request made for information to be faxed to [phone].    Electronically signed by Ana Leggett RN on 11/13/2024 at 10:29 AM    
Marine with Dr Castañeda for DC of tele.   
New consult sent to Dr Shook via [employer] message.  
PATIENT IV OUT, UNABLE TO RESTART. NOT TO IV TEAM FOR  NEW IV. PHLEBOTOMY TO GET LAB.  
Patient signed bed and charm alarm waiver. Placed in lite chart. Patient understands she is a high fall risk and understands risk for injury with fall etc.  Electronically signed by Tracie Herrera RN on 11/13/2024 at 12:52 PM    
RENARD ALBERTO NOTIFIED OF LOST IV ACCESS. SHE IS ALSI IV TEAM HAS BEEN NOTIFIED. WAITING FOR RESPONSE ? R/T WAIT FOR IV SOLUMEDROL AND GIVE AT  IV ACCESS OBTAINED OR ORDER FOR PREDNISONE.   
Report given to STEVIE Red  
Room air at rest pulse ox 92%  Room air while ambulate pulse ox 83%  Pulse ox 92% while ambulating on 2L O2  At rest on 2L O2 pulse ox 95%      
Tamiko Ch was ordered Adderall XR capsule which is a nonformulary medication.  This medication is not stocked by the pharmacy so it will need to be brought in from home for inpatient use.    If the medication has not been administered by 1400 on the following day from the time the order was placed, a pharmacist will follow-up with the nurse of the patient to assess the capability of the patient to bring in the medication.    Thank you  
  Pulmonary/Chest: diminished bilaterally- + wheezes, rales or rhonchi, normal air movement, no respiratory distress  Cardiovascular: normal rate, normal S1 and S2 and no carotid bruits  Abdomen: soft, non-tender, non-distended, normal bowel sounds, no masses or organomegaly  Extremities: no cyanosis, no clubbing and no edema  Neurologic: no cranial nerve deficit and speech normal        Recent Labs     11/11/24  1401 11/12/24  0325    141   K 3.9 4.4   CL 98 98   CO2 35* 32*   BUN 11 16   CREATININE 0.8 0.8   GLUCOSE 119* 148*   CALCIUM 10.0 10.0       Recent Labs     11/11/24  1401 11/12/24  0325   WBC 8.3 6.7   RBC 5.20 5.09   HGB 16.9* 16.6*   HCT 52.2* 49.9*   .4* 98.0   MCH 32.5 32.6   MCHC 32.4 33.3   RDW 13.9 13.8    272   MPV 10.2 10.1       Micro:  No components found for: \"BC)\"    Radiology:   XR CHEST (2 VW)    Result Date: 11/11/2024  EXAMINATION: TWO XRAY VIEWS OF THE CHEST 11/11/2024 12:22 pm COMPARISON: None. HISTORY: ORDERING SYSTEM PROVIDED HISTORY: Shortness of breath TECHNOLOGIST PROVIDED HISTORY: Reason for exam:->Shortness of breath FINDINGS: The lungs are without acute focal process.  There is no effusion or pneumothorax. The cardiomediastinal silhouette is without acute process. The osseous structures are without acute process.     No acute process.       Assessment:    Principal Problem:    Acute respiratory failure with hypoxia  Active Problems:    Gastroesophageal reflux disease  Resolved Problems:    * No resolved hospital problems. *      Plan:  Acute hypoxic respiratory failure - multifactorial likely sec to Acute exacerbation of severe COPD . Rule out viral illness. , give one dose of Lasix. On 6 L via NC, baseline 5L nightly . Wean as tolerated. Continue nebulizer treatments. Hold off further antibiotics and steroids. Procalcitonin and respiratory viral panel neg. Continue guaifenesin  GERD - continue PPI   ADHD - continue home medications   HLD - continue 
Meds:   albuterol  2.5 mg Nebulization 4x Daily RT    allopurinol  300 mg Oral Daily    amphetamine-dextroamphetamine  30 mg Oral Daily    DULoxetine  60 mg Oral BID    budesonide  1 mg Nebulization BID RT    And    arformoterol tartrate  15 mcg Nebulization BID RT    [Held by provider] furosemide  20 mg Oral Daily    guaiFENesin  400 mg Oral TID    metoprolol succinate  25 mg Oral Daily    therapeutic multivitamin-minerals  1 tablet Oral Daily    nicotine  1 patch TransDERmal Q24H    pregabalin  75 mg Oral TID    rosuvastatin  20 mg Oral Daily    sodium chloride flush  5-40 mL IntraVENous 2 times per day    enoxaparin  30 mg SubCUTAneous BID    pantoprazole  40 mg Oral QAM AC    methylPREDNISolone  40 mg IntraVENous Q8H       Physical Exam:  General Appearance: appears comfortable in no acute distress.   HEENT: Normocephalic atraumatic without obvious abnormality   Neck: Lips, mucosa, and tongue normal.  Supple, symmetrical, trachea midline, no adenopathy;thyroid:  no enlargement/tenderness/nodules or JVD.  Lung: Breath sounds diminished. Respirations   unlabored. Symmetrical expansion.  Heart: RRR, normal S1, S2. No MRG  Abdomen: Soft, NT, ND. BS present x 4 quadrants. No bruit or organomegaly.   Extremities: Pedal pulses 2+ symmetric b/l.  Extremities normal, no cyanosis, clubbing, or edema.   Musculokeletal: No joint swelling, no muscle tenderness. ROM normal in all joints of extremities.   Neurologic: Mental status: Alert and Oriented X3 .    Pertinent/ New Labs and Imaging Studies     Imaging personally reviewed:  CXR 11/11/2024:  FINDINGS:  The lungs are without acute focal process.  There is no effusion or  pneumothorax. The cardiomediastinal silhouette is without acute process. The  osseous structures are without acute process.     IMPRESSION:  No acute process.      Labs:  Lab Results   Component Value Date/Time    WBC 10.1 11/13/2024 03:46 AM    RBC 4.73 11/13/2024 03:46 AM    HGB 15.4 11/13/2024 03:46 AM 
and steroids. Procalcitonin and respiratory viral panel neg. Continue guaifenesin  GERD - continue PPI   ADHD - continue home medications   HLD - continue statin  ANGLE on CPAP  Tobacco abuse - nicotine replacement, counseled cessation   Morbid Obesity - lifestyle modifications     DVT prophylaxis: Lovenox   Dispo - remains IP on IV steroids, 3L oxygen    NOTE: This report was transcribed using voice recognition software. Every effort was made to ensure accuracy; however, inadvertent computerized transcription errors may be present.  Electronically signed by Hamilton Castañeda MD on 11/13/2024 at 12:19 PM

## 2024-11-14 NOTE — DISCHARGE SUMMARY
were sent to [facility] [address] [place] [address] -  [phone] - F [phone] [address] [place] [id number]      Phone: [phone]   levalbuterol 0.63 MG/3ML nebulization  predniSONE 10 MG tablet           Note that more than 30 minutes was spent in preparing discharge papers, discussing discharge with patient, medication review, etc.    Signed:  Electronically signed by Florian Ryan MD on 11/14/2024 at 12:56 PM

## 2024-11-15 ENCOUNTER — CARE COORDINATION (OUTPATIENT)
Dept: CARE COORDINATION | Age: 59
End: 2024-11-15

## 2024-11-15 DIAGNOSIS — J44.9 CHRONIC OBSTRUCTIVE PULMONARY DISEASE, UNSPECIFIED COPD TYPE (HCC): Primary | ICD-10-CM

## 2024-11-15 NOTE — CARE COORDINATION
Care Transitions Note    Initial Call - Call within 2 business days of discharge: Yes    Patient Current Location:  Home: [address] [place] [address]    Care Transition Nurse contacted the patient by telephone to perform post hospital discharge assessment, verified name and  as identifiers. Provided introduction to self, and explanation of the Care Transition Nurse role.     Patient: Tamiko Ch    Patient : 1965   MRN: [id number]    Reason for Admission: COPD exac  Discharge Date: 24  RURS: Readmission Risk Score: 12.1      Last Discharge Facility       Date Complaint Diagnosis Description Type Department Provider    24 Shortness of Breath COPD exacerbation (HCC) ED to Hosp-Admission (Discharged) (ADMITTED) Florian Duran MD; Fili,...     Additional needs identified to be addressed with provider   No needs identified         Method of communication with provider: none.    Patients top risk factors for readmission: medical condition-COPD and polypharmacy    Interventions to address risk factors:   Education:    DME: confirmed with [facility] that new nebulizer will be delivered today.     Care Summary Note: Spoke with Tamiko for initial low readmission risk score care transition call post hospital discharge. She reports feeling \"ok\" today. She continues to feel tired and has a lot of drainage but denies SOB or LARRY today. She is resting today as she is leaving tomorrow for [place] and then going on a cruise.   She has not received her nebulizer machine yet, she is in agreement with CTN contacting [facility].     Spoke with Andrew at [facility], he was able to message the liaison Nicole while on the phone with this CTN and confirmed she is on her way to the patients house with the nebulizer.     Updated Tamiko that Nicole is on her way with the nebulizer now.   She declined scheduling at this time as she is leaving tomorrow for vacation. She denies any needs, questions,

## 2024-11-16 LAB
MICROORGANISM SPEC CULT: NORMAL
SERVICE CMNT-IMP: NORMAL
SPECIMEN DESCRIPTION: NORMAL

## 2024-11-25 ENCOUNTER — TELEPHONE (OUTPATIENT)
Dept: FAMILY MEDICINE CLINIC | Age: 59
End: 2024-11-25

## 2024-11-25 NOTE — TELEPHONE ENCOUNTER
[facility] paperwork dropped off for Tamiko.    In Dr Patel's mailbox up front.

## 2024-11-27 ENCOUNTER — CARE COORDINATION (OUTPATIENT)
Dept: CARE COORDINATION | Age: 59
End: 2024-11-27

## 2024-11-27 PROBLEM — R06.09 DYSPNEA ON EXERTION: Status: ACTIVE | Noted: 2024-11-27

## 2024-11-27 PROBLEM — G47.33 OSA (OBSTRUCTIVE SLEEP APNEA): Status: ACTIVE | Noted: 2024-11-27

## 2024-11-27 PROBLEM — J96.11 CHRONIC HYPOXIC RESPIRATORY FAILURE: Status: ACTIVE | Noted: 2024-11-27

## 2024-11-27 NOTE — CARE COORDINATION
Care Transitions Note    Follow Up Call     Spoke briefly with Tamiko, she requested a call back at another day because she is presently at the pulmonologist's office.  Follow Up Appointment:   Future Appointments         Provider Specialty Dept Phone    1/7/2025 11:45 AM Marli Patel,  Family Medicine [phone]          Deya Dwyer RN

## 2024-12-03 ENCOUNTER — CARE COORDINATION (OUTPATIENT)
Dept: CARE COORDINATION | Age: 59
End: 2024-12-03

## 2024-12-03 NOTE — CARE COORDINATION
Care Transitions Note    Follow Up Call     First attempt to reach the patient for subsequent Care Transition call. HIPAA compliant message left with CTN's contact information requesting return phone call.      Outreach Attempts:   HIPAA compliant voicemail left for patient.     Follow Up Appointment:   Future Appointments         Provider Specialty Dept Phone    1/7/2025 11:45 AM Marli Patel DO Family Medicine [phone]    1/21/2025 1:00 PM Kodak Noriega MD Pulmonology [phone]          Deya Dwyer RN

## 2024-12-10 ENCOUNTER — CARE COORDINATION (OUTPATIENT)
Dept: CARE COORDINATION | Age: 59
End: 2024-12-10

## 2024-12-10 NOTE — CARE COORDINATION
Care Transitions Note    Follow Up Call   Second and final attempt to reach the patient for subsequent Care Transition call. HIPAA compliant message left with CTN's contact information requesting return phone call.   Will sign off.    Follow Up Appointment:   Future Appointments         Provider Specialty Dept Phone    1/7/2025 11:45 AM Marli Patel DO [facility] [phone]    1/21/2025 1:00 PM Kodak Noriega MD Pulmonology [phone]          Deya Dwyer RN

## 2025-01-07 ENCOUNTER — TELEMEDICINE (OUTPATIENT)
Dept: FAMILY MEDICINE CLINIC | Age: 60
End: 2025-01-07
Payer: COMMERCIAL

## 2025-01-07 DIAGNOSIS — B02.9 HERPES ZOSTER WITHOUT COMPLICATION: ICD-10-CM

## 2025-01-07 DIAGNOSIS — R60.0 LOWER EXTREMITY EDEMA: ICD-10-CM

## 2025-01-07 DIAGNOSIS — F90.0 ATTENTION DEFICIT HYPERACTIVITY DISORDER (ADHD), PREDOMINANTLY INATTENTIVE TYPE: ICD-10-CM

## 2025-01-07 DIAGNOSIS — G62.89 OTHER POLYNEUROPATHY: ICD-10-CM

## 2025-01-07 DIAGNOSIS — M1A.00X0 IDIOPATHIC CHRONIC GOUT WITHOUT TOPHUS, UNSPECIFIED SITE: ICD-10-CM

## 2025-01-07 DIAGNOSIS — F32.1 CURRENT MODERATE EPISODE OF MAJOR DEPRESSIVE DISORDER WITHOUT PRIOR EPISODE (HCC): Primary | ICD-10-CM

## 2025-01-07 DIAGNOSIS — E78.2 MIXED HYPERLIPIDEMIA: ICD-10-CM

## 2025-01-07 DIAGNOSIS — J43.2 CENTRILOBULAR EMPHYSEMA (HCC): ICD-10-CM

## 2025-01-07 DIAGNOSIS — G25.81 RESTLESS LEGS SYNDROME: ICD-10-CM

## 2025-01-07 PROBLEM — J96.22 ACUTE ON CHRONIC RESPIRATORY FAILURE WITH HYPERCAPNIA: Status: RESOLVED | Noted: 2024-01-03 | Resolved: 2025-01-07

## 2025-01-07 PROBLEM — J96.11 CHRONIC HYPOXIC RESPIRATORY FAILURE: Status: RESOLVED | Noted: 2024-11-27 | Resolved: 2025-01-07

## 2025-01-07 PROBLEM — J96.00 ACUTE RESPIRATORY FAILURE: Status: RESOLVED | Noted: 2023-09-19 | Resolved: 2025-01-07

## 2025-01-07 PROBLEM — J96.01 ACUTE RESPIRATORY FAILURE WITH HYPOXIA: Status: RESOLVED | Noted: 2023-09-19 | Resolved: 2025-01-07

## 2025-01-07 PROCEDURE — 99214 OFFICE O/P EST MOD 30 MIN: CPT | Performed by: FAMILY MEDICINE

## 2025-01-07 RX ORDER — DEXTROAMPHETAMINE SACCHARATE, AMPHETAMINE ASPARTATE MONOHYDRATE, DEXTROAMPHETAMINE SULFATE AND AMPHETAMINE SULFATE 7.5; 7.5; 7.5; 7.5 MG/1; MG/1; MG/1; MG/1
30 CAPSULE, EXTENDED RELEASE ORAL DAILY
Qty: 30 CAPSULE | Refills: 0 | Status: SHIPPED | OUTPATIENT
Start: 2025-01-15 | End: 2025-02-14

## 2025-01-07 RX ORDER — FAMOTIDINE 20 MG/1
20 TABLET, FILM COATED ORAL 2 TIMES DAILY
Qty: 60 TABLET | Refills: 5 | Status: SHIPPED | OUTPATIENT
Start: 2025-01-07

## 2025-01-07 RX ORDER — ALLOPURINOL 300 MG/1
TABLET ORAL
Qty: 30 TABLET | Refills: 5 | Status: SHIPPED | OUTPATIENT
Start: 2025-01-07

## 2025-01-07 RX ORDER — DEXTROAMPHETAMINE SACCHARATE, AMPHETAMINE ASPARTATE MONOHYDRATE, DEXTROAMPHETAMINE SULFATE AND AMPHETAMINE SULFATE 7.5; 7.5; 7.5; 7.5 MG/1; MG/1; MG/1; MG/1
30 CAPSULE, EXTENDED RELEASE ORAL DAILY
Qty: 30 CAPSULE | Refills: 0 | Status: SHIPPED | OUTPATIENT
Start: 2025-02-13 | End: 2025-03-15

## 2025-01-07 RX ORDER — PREGABALIN 75 MG/1
75 CAPSULE ORAL 3 TIMES DAILY
Qty: 90 CAPSULE | Refills: 2 | Status: SHIPPED | OUTPATIENT
Start: 2025-01-07 | End: 2025-02-06

## 2025-01-07 RX ORDER — VALACYCLOVIR HYDROCHLORIDE 1 G/1
1000 TABLET, FILM COATED ORAL 3 TIMES DAILY
Qty: 30 TABLET | Refills: 0 | Status: SHIPPED | OUTPATIENT
Start: 2025-01-07 | End: 2025-01-17

## 2025-01-07 RX ORDER — DEXTROAMPHETAMINE SACCHARATE, AMPHETAMINE ASPARTATE MONOHYDRATE, DEXTROAMPHETAMINE SULFATE AND AMPHETAMINE SULFATE 7.5; 7.5; 7.5; 7.5 MG/1; MG/1; MG/1; MG/1
30 CAPSULE, EXTENDED RELEASE ORAL DAILY
Qty: 30 CAPSULE | Refills: 0 | Status: SHIPPED | OUTPATIENT
Start: 2025-03-11 | End: 2025-04-10

## 2025-01-07 RX ORDER — ROSUVASTATIN CALCIUM 20 MG/1
20 TABLET, COATED ORAL DAILY
Qty: 30 TABLET | Refills: 5 | Status: SHIPPED | OUTPATIENT
Start: 2025-01-07

## 2025-01-07 RX ORDER — POTASSIUM CHLORIDE 1500 MG/1
20 TABLET, EXTENDED RELEASE ORAL DAILY
Qty: 30 TABLET | Refills: 5 | Status: SHIPPED | OUTPATIENT
Start: 2025-01-07

## 2025-01-07 RX ORDER — FUROSEMIDE 20 MG/1
20 TABLET ORAL DAILY
Qty: 90 TABLET | Refills: 1 | Status: SHIPPED | OUTPATIENT
Start: 2025-01-07

## 2025-01-07 RX ORDER — METOPROLOL SUCCINATE 25 MG/1
25 TABLET, EXTENDED RELEASE ORAL DAILY
Qty: 30 TABLET | Refills: 5 | Status: SHIPPED | OUTPATIENT
Start: 2025-01-07

## 2025-01-07 RX ORDER — DULOXETIN HYDROCHLORIDE 60 MG/1
60 CAPSULE, DELAYED RELEASE ORAL 2 TIMES DAILY
Qty: 60 CAPSULE | Refills: 5 | Status: SHIPPED | OUTPATIENT
Start: 2025-01-07

## 2025-01-07 ASSESSMENT — ENCOUNTER SYMPTOMS
SORE THROAT: 0
COUGH: 0
BACK PAIN: 0
TROUBLE SWALLOWING: 0
EYE PAIN: 0
CHEST TIGHTNESS: 0
WHEEZING: 0
DIARRHEA: 0
ABDOMINAL PAIN: 0
CONSTIPATION: 0
SHORTNESS OF BREATH: 0
VOMITING: 0
NAUSEA: 0
SINUS PAIN: 0

## 2025-01-07 ASSESSMENT — PATIENT HEALTH QUESTIONNAIRE - PHQ9
4. FEELING TIRED OR HAVING LITTLE ENERGY: SEVERAL DAYS
8. MOVING OR SPEAKING SO SLOWLY THAT OTHER PEOPLE COULD HAVE NOTICED. OR THE OPPOSITE, BEING SO FIGETY OR RESTLESS THAT YOU HAVE BEEN MOVING AROUND A LOT MORE THAN USUAL: NOT AT ALL
5. POOR APPETITE OR OVEREATING: NOT AT ALL
9. THOUGHTS THAT YOU WOULD BE BETTER OFF DEAD, OR OF HURTING YOURSELF: NOT AT ALL
3. TROUBLE FALLING OR STAYING ASLEEP: SEVERAL DAYS
7. TROUBLE CONCENTRATING ON THINGS, SUCH AS READING THE NEWSPAPER OR WATCHING TELEVISION: NOT AT ALL
1. LITTLE INTEREST OR PLEASURE IN DOING THINGS: SEVERAL DAYS
SUM OF ALL RESPONSES TO PHQ QUESTIONS 1-9: 4
10. IF YOU CHECKED OFF ANY PROBLEMS, HOW DIFFICULT HAVE THESE PROBLEMS MADE IT FOR YOU TO DO YOUR WORK, TAKE CARE OF THINGS AT HOME, OR GET ALONG WITH OTHER PEOPLE: NOT DIFFICULT AT ALL
SUM OF ALL RESPONSES TO PHQ9 QUESTIONS 1 & 2: 2
2. FEELING DOWN, DEPRESSED OR HOPELESS: SEVERAL DAYS
SUM OF ALL RESPONSES TO PHQ QUESTIONS 1-9: 4
SUM OF ALL RESPONSES TO PHQ QUESTIONS 1-9: 4
6. FEELING BAD ABOUT YOURSELF - OR THAT YOU ARE A FAILURE OR HAVE LET YOURSELF OR YOUR FAMILY DOWN: NOT AT ALL
SUM OF ALL RESPONSES TO PHQ QUESTIONS 1-9: 4

## 2025-01-07 NOTE — ASSESSMENT & PLAN NOTE
Doing really well, the lyrica is really ehlping, no changes    Orders:    pregabalin (LYRICA) 75 MG capsule; Take 1 capsule by mouth 3 times daily for 30 days. Max Daily Amount: 225 mg

## 2025-01-07 NOTE — ASSESSMENT & PLAN NOTE
Stable with current adderall dose, still effective, oarrs reviewed and refills sent    Orders:    amphetamine-dextroamphetamine (ADDERALL XR) 30 MG extended release capsule; Take 1 capsule by mouth daily for 30 days. Max Daily Amount: 30 mg    amphetamine-dextroamphetamine (ADDERALL XR) 30 MG extended release capsule; Take 1 capsule by mouth daily for 30 days. Max Daily Amount: 30 mg    amphetamine-dextroamphetamine (ADDERALL XR) 30 MG extended release capsule; Take 1 capsule by mouth daily for 30 days. Max Daily Amount: 30 mg

## 2025-01-07 NOTE — ASSESSMENT & PLAN NOTE
Lyrica seems effective, no changes at this time, oarrs reviewed and refills okay    Orders:    pregabalin (LYRICA) 75 MG capsule; Take 1 capsule by mouth 3 times daily for 30 days. Max Daily Amount: 225 mg

## 2025-01-26 ENCOUNTER — HOSPITAL ENCOUNTER (OUTPATIENT)
Dept: GENERAL RADIOLOGY | Age: 60
Discharge: HOME | End: 2025-01-28
Payer: COMMERCIAL

## 2025-01-26 ENCOUNTER — HOSPITAL ENCOUNTER (OUTPATIENT)
Age: 60
Discharge: HOME | End: 2025-01-28
Payer: COMMERCIAL

## 2025-01-26 DIAGNOSIS — J96.11 CHRONIC HYPOXIC RESPIRATORY FAILURE: ICD-10-CM

## 2025-01-26 DIAGNOSIS — J43.2 CENTRILOBULAR EMPHYSEMA (HCC): ICD-10-CM

## 2025-01-26 PROCEDURE — 71046 X-RAY EXAM CHEST 2 VIEWS: CPT

## 2025-02-19 ENCOUNTER — HOSPITAL ENCOUNTER (OUTPATIENT)
Dept: SLEEP CENTER | Age: 60
Discharge: HOME | End: 2025-02-19
Payer: COMMERCIAL

## 2025-02-19 DIAGNOSIS — G47.33 OSA (OBSTRUCTIVE SLEEP APNEA): ICD-10-CM

## 2025-02-19 PROCEDURE — 95811 POLYSOM 6/>YRS CPAP 4/> PARM: CPT

## 2025-02-19 PROCEDURE — 2700000000 HC OXYGEN THERAPY PER DAY

## 2025-02-20 VITALS
HEIGHT: 64 IN | HEART RATE: 88 BPM | DIASTOLIC BLOOD PRESSURE: 80 MMHG | OXYGEN SATURATION: 92 % | WEIGHT: 220 LBS | BODY MASS INDEX: 37.56 KG/M2 | SYSTOLIC BLOOD PRESSURE: 118 MMHG

## 2025-02-20 ASSESSMENT — SLEEP AND FATIGUE QUESTIONNAIRES
ESS TOTAL SCORE: 15
HOW LIKELY ARE YOU TO NOD OFF OR FALL ASLEEP WHILE LYING DOWN TO REST IN THE AFTERNOON WHEN CIRCUMSTANCES PERMIT: HIGH CHANCE OF DOZING
HOW LIKELY ARE YOU TO NOD OFF OR FALL ASLEEP WHILE WATCHING TV: HIGH CHANCE OF DOZING
HOW LIKELY ARE YOU TO NOD OFF OR FALL ASLEEP IN A CAR, WHILE STOPPED FOR A FEW MINUTES IN TRAFFIC: SLIGHT CHANCE OF DOZING
HOW LIKELY ARE YOU TO NOD OFF OR FALL ASLEEP WHILE SITTING INACTIVE IN A PUBLIC PLACE: WOULD NEVER DOZE
HOW LIKELY ARE YOU TO NOD OFF OR FALL ASLEEP WHILE SITTING QUIETLY AFTER LUNCH WITHOUT ALCOHOL: HIGH CHANCE OF DOZING
HOW LIKELY ARE YOU TO NOD OFF OR FALL ASLEEP WHEN YOU ARE A PASSENGER IN A CAR FOR AN HOUR WITHOUT A BREAK: HIGH CHANCE OF DOZING
HOW LIKELY ARE YOU TO NOD OFF OR FALL ASLEEP WHILE SITTING AND TALKING TO SOMEONE: WOULD NEVER DOZE
HOW LIKELY ARE YOU TO NOD OFF OR FALL ASLEEP WHILE SITTING AND READING: MODERATE CHANCE OF DOZING

## 2025-03-13 ENCOUNTER — HOSPITAL ENCOUNTER (OUTPATIENT)
Dept: CT IMAGING | Age: 60
Discharge: HOME | End: 2025-03-15
Attending: STUDENT IN AN ORGANIZED HEALTH CARE EDUCATION/TRAINING PROGRAM
Payer: COMMERCIAL

## 2025-03-13 ENCOUNTER — HOSPITAL ENCOUNTER (OUTPATIENT)
Dept: PULMONOLOGY | Age: 60
Discharge: HOME | End: 2025-03-13
Attending: STUDENT IN AN ORGANIZED HEALTH CARE EDUCATION/TRAINING PROGRAM
Payer: COMMERCIAL

## 2025-03-13 DIAGNOSIS — R91.1 PULMONARY NODULE: ICD-10-CM

## 2025-03-13 DIAGNOSIS — J43.2 CENTRILOBULAR EMPHYSEMA (HCC): ICD-10-CM

## 2025-03-13 DIAGNOSIS — R06.09 DYSPNEA ON EXERTION: ICD-10-CM

## 2025-03-13 DIAGNOSIS — F17.210 SMOKING GREATER THAN 40 PACK YEARS: ICD-10-CM

## 2025-03-13 PROCEDURE — 71250 CT THORAX DX C-: CPT

## 2025-03-13 PROCEDURE — 94729 DIFFUSING CAPACITY: CPT

## 2025-03-13 PROCEDURE — 94726 PLETHYSMOGRAPHY LUNG VOLUMES: CPT

## 2025-03-13 PROCEDURE — 94060 EVALUATION OF WHEEZING: CPT

## 2025-03-17 ENCOUNTER — PATIENT MESSAGE (OUTPATIENT)
Dept: FAMILY MEDICINE CLINIC | Age: 60
End: 2025-03-17

## 2025-03-28 DIAGNOSIS — R73.03 PREDIABETES: ICD-10-CM

## 2025-03-28 DIAGNOSIS — G62.89 OTHER POLYNEUROPATHY: ICD-10-CM

## 2025-03-28 LAB
ALBUMIN: 3.8 G/DL (ref 3.5–5.2)
ALP BLD-CCNC: 113 U/L (ref 35–104)
ALT SERPL-CCNC: 13 U/L (ref 0–32)
ANION GAP SERPL CALCULATED.3IONS-SCNC: 17 MMOL/L (ref 7–16)
AST SERPL-CCNC: 21 U/L (ref 0–31)
BASOPHILS ABSOLUTE: 0.05 K/UL (ref 0–0.2)
BASOPHILS RELATIVE PERCENT: 1 % (ref 0–2)
BILIRUB SERPL-MCNC: 0.2 MG/DL (ref 0–1.2)
BUN BLDV-MCNC: 11 MG/DL (ref 6–23)
CALCIUM SERPL-MCNC: 9.6 MG/DL (ref 8.6–10.2)
CHLORIDE BLD-SCNC: 102 MMOL/L (ref 98–107)
CHOLESTEROL, TOTAL: 164 MG/DL
CO2: 26 MMOL/L (ref 22–29)
CREAT SERPL-MCNC: 0.8 MG/DL (ref 0.5–1)
EOSINOPHILS ABSOLUTE: 0.27 K/UL (ref 0.05–0.5)
EOSINOPHILS RELATIVE PERCENT: 4 % (ref 0–6)
GFR, ESTIMATED: 81 ML/MIN/1.73M2
GLUCOSE BLD-MCNC: 123 MG/DL (ref 74–99)
HBA1C MFR BLD: 5.6 % (ref 4–5.6)
HCT VFR BLD CALC: 50.9 % (ref 34–48)
HDLC SERPL-MCNC: 52 MG/DL
HEMOGLOBIN: 16.2 G/DL (ref 11.5–15.5)
IMMATURE GRANULOCYTES %: 0 % (ref 0–5)
IMMATURE GRANULOCYTES ABSOLUTE: <0.03 K/UL (ref 0–0.58)
LDL CHOLESTEROL: 83 MG/DL
LYMPHOCYTES ABSOLUTE: 1.56 K/UL (ref 1.5–4)
LYMPHOCYTES RELATIVE PERCENT: 23 % (ref 20–42)
MCH RBC QN AUTO: 33.3 PG (ref 26–35)
MCHC RBC AUTO-ENTMCNC: 31.8 G/DL (ref 32–34.5)
MCV RBC AUTO: 104.5 FL (ref 80–99.9)
MONOCYTES ABSOLUTE: 0.38 K/UL (ref 0.1–0.95)
MONOCYTES RELATIVE PERCENT: 6 % (ref 2–12)
NEUTROPHILS ABSOLUTE: 4.46 K/UL (ref 1.8–7.3)
NEUTROPHILS RELATIVE PERCENT: 66 % (ref 43–80)
PDW BLD-RTO: 14 % (ref 11.5–15)
PLATELET # BLD: 206 K/UL (ref 130–450)
PMV BLD AUTO: 10.5 FL (ref 7–12)
POTASSIUM SERPL-SCNC: 5 MMOL/L (ref 3.5–5)
RBC # BLD: 4.87 M/UL (ref 3.5–5.5)
SODIUM BLD-SCNC: 145 MMOL/L (ref 132–146)
TOTAL PROTEIN: 7 G/DL (ref 6.4–8.3)
TRIGL SERPL-MCNC: 144 MG/DL
VLDLC SERPL CALC-MCNC: 29 MG/DL
WBC # BLD: 6.7 K/UL (ref 4.5–11.5)

## 2025-03-31 ENCOUNTER — OFFICE VISIT (OUTPATIENT)
Dept: FAMILY MEDICINE CLINIC | Age: 60
End: 2025-03-31
Payer: COMMERCIAL

## 2025-03-31 VITALS
HEIGHT: 61 IN | DIASTOLIC BLOOD PRESSURE: 72 MMHG | WEIGHT: 236.8 LBS | TEMPERATURE: 98.4 F | SYSTOLIC BLOOD PRESSURE: 116 MMHG | BODY MASS INDEX: 44.71 KG/M2 | OXYGEN SATURATION: 92 % | HEART RATE: 90 BPM

## 2025-03-31 DIAGNOSIS — G62.89 OTHER POLYNEUROPATHY: Primary | ICD-10-CM

## 2025-03-31 DIAGNOSIS — R73.03 PREDIABETES: ICD-10-CM

## 2025-03-31 DIAGNOSIS — G25.81 RESTLESS LEGS SYNDROME: ICD-10-CM

## 2025-03-31 DIAGNOSIS — F90.0 ATTENTION DEFICIT HYPERACTIVITY DISORDER (ADHD), PREDOMINANTLY INATTENTIVE TYPE: ICD-10-CM

## 2025-03-31 DIAGNOSIS — J43.2 CENTRILOBULAR EMPHYSEMA (HCC): ICD-10-CM

## 2025-03-31 DIAGNOSIS — Z12.31 ENCOUNTER FOR SCREENING MAMMOGRAM FOR BREAST CANCER: ICD-10-CM

## 2025-03-31 PROCEDURE — 99214 OFFICE O/P EST MOD 30 MIN: CPT | Performed by: FAMILY MEDICINE

## 2025-03-31 PROCEDURE — 3074F SYST BP LT 130 MM HG: CPT | Performed by: FAMILY MEDICINE

## 2025-03-31 PROCEDURE — 3078F DIAST BP <80 MM HG: CPT | Performed by: FAMILY MEDICINE

## 2025-03-31 RX ORDER — DEXTROAMPHETAMINE SACCHARATE, AMPHETAMINE ASPARTATE MONOHYDRATE, DEXTROAMPHETAMINE SULFATE AND AMPHETAMINE SULFATE 7.5; 7.5; 7.5; 7.5 MG/1; MG/1; MG/1; MG/1
30 CAPSULE, EXTENDED RELEASE ORAL DAILY
Qty: 30 CAPSULE | Refills: 0 | Status: SHIPPED | OUTPATIENT
Start: 2025-05-17 | End: 2025-06-16

## 2025-03-31 RX ORDER — PREGABALIN 75 MG/1
75 CAPSULE ORAL 3 TIMES DAILY
Qty: 90 CAPSULE | Refills: 2 | Status: SHIPPED | OUTPATIENT
Start: 2025-03-31 | End: 2025-04-30

## 2025-03-31 RX ORDER — DEXTROAMPHETAMINE SACCHARATE, AMPHETAMINE ASPARTATE MONOHYDRATE, DEXTROAMPHETAMINE SULFATE AND AMPHETAMINE SULFATE 7.5; 7.5; 7.5; 7.5 MG/1; MG/1; MG/1; MG/1
30 CAPSULE, EXTENDED RELEASE ORAL DAILY
Qty: 30 CAPSULE | Refills: 0 | Status: SHIPPED | OUTPATIENT
Start: 2025-06-15 | End: 2025-07-15

## 2025-03-31 RX ORDER — DEXTROAMPHETAMINE SACCHARATE, AMPHETAMINE ASPARTATE MONOHYDRATE, DEXTROAMPHETAMINE SULFATE AND AMPHETAMINE SULFATE 7.5; 7.5; 7.5; 7.5 MG/1; MG/1; MG/1; MG/1
30 CAPSULE, EXTENDED RELEASE ORAL DAILY
Qty: 30 CAPSULE | Refills: 0 | Status: SHIPPED | OUTPATIENT
Start: 2025-04-19 | End: 2025-05-19

## 2025-03-31 ASSESSMENT — ENCOUNTER SYMPTOMS
CONSTIPATION: 0
NAUSEA: 0
COUGH: 0
TROUBLE SWALLOWING: 0
BACK PAIN: 0
EYE PAIN: 0
SINUS PAIN: 0
CHEST TIGHTNESS: 0
SHORTNESS OF BREATH: 1
SORE THROAT: 0
DIARRHEA: 0
WHEEZING: 0
VOMITING: 0
ABDOMINAL PAIN: 0

## 2025-03-31 NOTE — PROGRESS NOTES
limited by shortness of breath due to COPD   Skin:     General: Skin is warm and dry.   Neurological:      Mental Status: She is alert and oriented to person, place, and time.   Psychiatric:         Mood and Affect: Mood normal.         Thought Content: Thought content normal.          Tamiko \"Odessa\" was seen today for depression, copd and peripheral neuropathy.    Diagnoses and all orders for this visit:    Other polyneuropathy  Comments:  on lyrica and it has been very helpful, still working, refills snet again  now legs are feeling much better, wlaking more and doing more  Orders:  -     pregabalin (LYRICA) 75 MG capsule; Take 1 capsule by mouth 3 times daily for 30 days. Max Daily Amount: 225 mg    Encounter for screening mammogram for breast cancer  Comments:  mammogram ordered it has been 2 years  Orders:  -     IGLESIA AMANDA DIGITAL SCREEN BILATERAL PER PROTOCOL; Future    Restless legs syndrome  Comments:  cont lyrica, resume 75mg tid-doing really well, no changes  [place]  Orders:  -     pregabalin (LYRICA) 75 MG capsule; Take 1 capsule by mouth 3 times daily for 30 days. Max Daily Amount: 225 mg    Attention deficit hyperactivity disorder (ADHD), predominantly inattentive type  Comments:  back on the adderall and doing much better  able to work, oarrs reviwed and refill sokay  but now depression is getting worse and that is affecting work  Orders:  -     amphetamine-dextroamphetamine (ADDERALL XR) 30 MG extended release capsule; Take 1 capsule by mouth daily for 30 days. Max Daily Amount: 30 mg  -     amphetamine-dextroamphetamine (ADDERALL XR) 30 MG extended release capsule; Take 1 capsule by mouth daily for 30 days. Max Daily Amount: 30 mg  -     amphetamine-dextroamphetamine (ADDERALL XR) 30 MG extended release capsule; Take 1 capsule by mouth daily for 30 days. Max Daily Amount: 30 mg    Centrilobular emphysema (HCC)  Comments:  seeing pulmonary, CT redone, sleep study

## 2025-04-01 LAB
CREATININE URINE: 235.5 MG/DL (ref 29–226)
MICROALBUMIN/CREAT 24H UR: 354 MG/L (ref 0–19)
MICROALBUMIN/CREAT UR-RTO: 150 MCG/MG CREAT (ref 0–30)

## 2025-04-30 PROBLEM — F17.200 NICOTINE DEPENDENCE WITH CURRENT USE: Status: ACTIVE | Noted: 2025-04-30

## 2025-04-30 PROBLEM — Z71.6 TOBACCO ABUSE COUNSELING: Status: ACTIVE | Noted: 2025-04-30

## 2025-06-30 ENCOUNTER — TELEMEDICINE (OUTPATIENT)
Dept: FAMILY MEDICINE CLINIC | Age: 60
End: 2025-06-30

## 2025-06-30 ENCOUNTER — TELEPHONE (OUTPATIENT)
Dept: FAMILY MEDICINE CLINIC | Age: 60
End: 2025-06-30

## 2025-06-30 DIAGNOSIS — R60.0 LOWER EXTREMITY EDEMA: ICD-10-CM

## 2025-06-30 DIAGNOSIS — E78.2 MIXED HYPERLIPIDEMIA: ICD-10-CM

## 2025-06-30 DIAGNOSIS — Z12.11 SCREEN FOR COLON CANCER: ICD-10-CM

## 2025-06-30 DIAGNOSIS — G25.81 RESTLESS LEGS SYNDROME: ICD-10-CM

## 2025-06-30 DIAGNOSIS — R73.03 PREDIABETES: ICD-10-CM

## 2025-06-30 DIAGNOSIS — F32.1 CURRENT MODERATE EPISODE OF MAJOR DEPRESSIVE DISORDER WITHOUT PRIOR EPISODE (HCC): ICD-10-CM

## 2025-06-30 DIAGNOSIS — J41.8 MIXED SIMPLE AND MUCOPURULENT CHRONIC BRONCHITIS (HCC): Primary | ICD-10-CM

## 2025-06-30 DIAGNOSIS — M1A.00X0 IDIOPATHIC CHRONIC GOUT WITHOUT TOPHUS, UNSPECIFIED SITE: ICD-10-CM

## 2025-06-30 DIAGNOSIS — F90.0 ATTENTION DEFICIT HYPERACTIVITY DISORDER (ADHD), PREDOMINANTLY INATTENTIVE TYPE: ICD-10-CM

## 2025-06-30 DIAGNOSIS — G62.89 OTHER POLYNEUROPATHY: ICD-10-CM

## 2025-06-30 RX ORDER — DEXTROAMPHETAMINE SACCHARATE, AMPHETAMINE ASPARTATE MONOHYDRATE, DEXTROAMPHETAMINE SULFATE AND AMPHETAMINE SULFATE 7.5; 7.5; 7.5; 7.5 MG/1; MG/1; MG/1; MG/1
30 CAPSULE, EXTENDED RELEASE ORAL DAILY
Qty: 30 CAPSULE | Refills: 0 | Status: SHIPPED | OUTPATIENT
Start: 2025-06-30 | End: 2025-07-30

## 2025-06-30 RX ORDER — PREGABALIN 75 MG/1
75 CAPSULE ORAL 3 TIMES DAILY
Qty: 90 CAPSULE | Refills: 2 | Status: SHIPPED | OUTPATIENT
Start: 2025-06-30 | End: 2025-07-30

## 2025-06-30 RX ORDER — FUROSEMIDE 20 MG/1
20 TABLET ORAL DAILY
Qty: 90 TABLET | Refills: 1 | Status: SHIPPED | OUTPATIENT
Start: 2025-06-30

## 2025-06-30 RX ORDER — ALBUTEROL SULFATE 90 UG/1
2 INHALANT RESPIRATORY (INHALATION) 4 TIMES DAILY PRN
Qty: 18 G | Refills: 2 | Status: SHIPPED | OUTPATIENT
Start: 2025-06-30

## 2025-06-30 RX ORDER — FAMOTIDINE 20 MG/1
20 TABLET, FILM COATED ORAL 2 TIMES DAILY
Qty: 60 TABLET | Refills: 5 | Status: SHIPPED | OUTPATIENT
Start: 2025-06-30

## 2025-06-30 RX ORDER — ROSUVASTATIN CALCIUM 20 MG/1
20 TABLET, COATED ORAL DAILY
Qty: 30 TABLET | Refills: 5 | Status: SHIPPED | OUTPATIENT
Start: 2025-06-30

## 2025-06-30 RX ORDER — DULOXETIN HYDROCHLORIDE 60 MG/1
60 CAPSULE, DELAYED RELEASE ORAL 2 TIMES DAILY
Qty: 60 CAPSULE | Refills: 5 | Status: SHIPPED | OUTPATIENT
Start: 2025-06-30

## 2025-06-30 RX ORDER — ALLOPURINOL 300 MG/1
TABLET ORAL
Qty: 30 TABLET | Refills: 5 | Status: SHIPPED | OUTPATIENT
Start: 2025-06-30

## 2025-06-30 RX ORDER — PREDNISONE 20 MG/1
20 TABLET ORAL 2 TIMES DAILY
Qty: 10 TABLET | Refills: 0 | Status: SHIPPED | OUTPATIENT
Start: 2025-06-30 | End: 2025-07-05

## 2025-06-30 RX ORDER — DEXTROAMPHETAMINE SACCHARATE, AMPHETAMINE ASPARTATE MONOHYDRATE, DEXTROAMPHETAMINE SULFATE AND AMPHETAMINE SULFATE 7.5; 7.5; 7.5; 7.5 MG/1; MG/1; MG/1; MG/1
30 CAPSULE, EXTENDED RELEASE ORAL DAILY
Qty: 30 CAPSULE | Refills: 0 | Status: SHIPPED | OUTPATIENT
Start: 2025-08-26 | End: 2025-09-25

## 2025-06-30 RX ORDER — DEXTROAMPHETAMINE SACCHARATE, AMPHETAMINE ASPARTATE MONOHYDRATE, DEXTROAMPHETAMINE SULFATE AND AMPHETAMINE SULFATE 7.5; 7.5; 7.5; 7.5 MG/1; MG/1; MG/1; MG/1
30 CAPSULE, EXTENDED RELEASE ORAL DAILY
Qty: 30 CAPSULE | Refills: 0 | Status: SHIPPED | OUTPATIENT
Start: 2025-07-28 | End: 2025-08-27

## 2025-06-30 RX ORDER — METOPROLOL SUCCINATE 25 MG/1
25 TABLET, EXTENDED RELEASE ORAL DAILY
Qty: 30 TABLET | Refills: 5 | Status: SHIPPED | OUTPATIENT
Start: 2025-06-30

## 2025-06-30 RX ORDER — POTASSIUM CHLORIDE 1500 MG/1
20 TABLET, EXTENDED RELEASE ORAL DAILY
Qty: 30 TABLET | Refills: 5 | Status: SHIPPED | OUTPATIENT
Start: 2025-06-30

## 2025-06-30 ASSESSMENT — ENCOUNTER SYMPTOMS
CHEST TIGHTNESS: 1
ABDOMINAL PAIN: 0
WHEEZING: 0
COUGH: 1
TROUBLE SWALLOWING: 0
CONSTIPATION: 0
EYE PAIN: 0
SHORTNESS OF BREATH: 1
NAUSEA: 0
DIARRHEA: 0
SORE THROAT: 0
VOMITING: 0
BACK PAIN: 0
SINUS PAIN: 0

## 2025-06-30 NOTE — ASSESSMENT & PLAN NOTE
On lyrica, only taking twicde  adacarmine, forgets her 3rd dose often    Orders:    pregabalin (LYRICA) 75 MG capsule; Take 1 capsule by mouth 3 times daily for 30 days. Max Daily Amount: 225 mg

## 2025-06-30 NOTE — ASSESSMENT & PLAN NOTE
Taking cymbalta, seems to be doing well, she feels the cymbalta is still working, no changes    Orders:    DULoxetine (CYMBALTA) 60 MG extended release capsule; Take 1 capsule by mouth 2 times daily

## 2025-06-30 NOTE — TELEPHONE ENCOUNTER
Please call patient to schedule appointment for 3 month follow up in the office with labs before.   (Appointment can not be later than 4:15 pm)

## 2025-06-30 NOTE — ASSESSMENT & PLAN NOTE
Lyrica has been helping, no issues, sleeping well, no changes    Orders:    pregabalin (LYRICA) 75 MG capsule; Take 1 capsule by mouth 3 times daily for 30 days. Max Daily Amount: 225 mg

## 2025-06-30 NOTE — ASSESSMENT & PLAN NOTE
Prednisone taper right now, cont breztri, should be doing nebulizers more often, has albuterol as needed for at work     Orders:    albuterol sulfate HFA (VENTOLIN HFA) 108 (90 Base) MCG/ACT inhaler; Inhale 2 puffs into the lungs 4 times daily as needed for Wheezing

## 2025-06-30 NOTE — ASSESSMENT & PLAN NOTE
Taking crestor, labs in 3 mo, no changes    Orders:    rosuvastatin (CRESTOR) 20 MG tablet; Take 1 tablet by mouth daily    CBC with Auto Differential; Future    Comprehensive Metabolic Panel; Future    Lipid Panel; Future

## 2025-06-30 NOTE — PROGRESS NOTES
Tamiko Ch, was evaluated through a synchronous (real-time) audio-video encounter. The patient (or guardian if applicable) is aware that this is a billable service, which includes applicable co-pays. This Virtual Visit was conducted with patient's (and/or legal guardian's) consent. Patient identification was verified, and a caregiver was present when appropriate.   The patient was located at Home: [address] [place], [place] [id number]  Provider was located at Facility (Appt Dept): [address] [place] [address]  Confirm you are appropriately licensed, registered, or certified to deliver care in the state where the patient is located as indicated above. If you are not or unsure, please re-schedule the visit: Yes, I confirm.     Tamiko Ch (:  1965) is a Established patient, presenting virtually for evaluation of the following: medication refill      Below is the assessment and plan developed based on review of pertinent history, physical exam, labs, studies, and medications.     Assessment & Plan  Mixed simple and mucopurulent chronic bronchitis (HCC)   Prednisone taper right now, cont breztri, should be doing nebulizers more often, has albuterol as needed for at work     Orders:    albuterol sulfate HFA (VENTOLIN HFA) 108 (90 Base) MCG/ACT inhaler; Inhale 2 puffs into the lungs 4 times daily as needed for Wheezing    Attention deficit hyperactivity disorder (ADHD), predominantly inattentive type   Doing well with adderall, no changes    Orders:    amphetamine-dextroamphetamine (ADDERALL XR) 30 MG extended release capsule; Take 1 capsule by mouth daily for 30 days. Max Daily Amount: 30 mg    amphetamine-dextroamphetamine (ADDERALL XR) 30 MG extended release capsule; Take 1 capsule by mouth daily for 30 days. Max Daily Amount: 30 mg    amphetamine-dextroamphetamine (ADDERALL XR) 30 MG extended release capsule; Take 1 capsule by mouth daily for 30 days. Max Daily

## 2025-06-30 NOTE — ASSESSMENT & PLAN NOTE
Doing well with adderall, no changes    Orders:    amphetamine-dextroamphetamine (ADDERALL XR) 30 MG extended release capsule; Take 1 capsule by mouth daily for 30 days. Max Daily Amount: 30 mg    amphetamine-dextroamphetamine (ADDERALL XR) 30 MG extended release capsule; Take 1 capsule by mouth daily for 30 days. Max Daily Amount: 30 mg    amphetamine-dextroamphetamine (ADDERALL XR) 30 MG extended release capsule; Take 1 capsule by mouth daily for 30 days. Max Daily Amount: 30 mg

## 2025-07-01 NOTE — TELEPHONE ENCOUNTER
Spoke with Od[place]. Scheduled appt for Thursday 10/2 at 11:30 AM.   Did advise that she needed to have her labs drawn, beforehand. She is agreeable.    Date & time of appt confirmed with Odessa.